# Patient Record
Sex: FEMALE | Race: WHITE | NOT HISPANIC OR LATINO | Employment: FULL TIME | URBAN - METROPOLITAN AREA
[De-identification: names, ages, dates, MRNs, and addresses within clinical notes are randomized per-mention and may not be internally consistent; named-entity substitution may affect disease eponyms.]

---

## 2018-06-06 ENCOUNTER — OFFICE VISIT (OUTPATIENT)
Dept: FAMILY MEDICINE | Facility: CLINIC | Age: 53
End: 2018-06-06
Payer: COMMERCIAL

## 2018-06-06 VITALS
DIASTOLIC BLOOD PRESSURE: 81 MMHG | TEMPERATURE: 98 F | HEART RATE: 51 BPM | OXYGEN SATURATION: 100 % | SYSTOLIC BLOOD PRESSURE: 133 MMHG | BODY MASS INDEX: 21.14 KG/M2 | WEIGHT: 98 LBS | HEIGHT: 57 IN | RESPIRATION RATE: 16 BRPM

## 2018-06-06 DIAGNOSIS — Z01.419 ENCOUNTER FOR GYNECOLOGICAL EXAMINATION WITHOUT ABNORMAL FINDING: Primary | ICD-10-CM

## 2018-06-06 DIAGNOSIS — Z12.11 SCREEN FOR COLON CANCER: ICD-10-CM

## 2018-06-06 DIAGNOSIS — R45.86 MOOD SWINGS: ICD-10-CM

## 2018-06-06 DIAGNOSIS — Z97.5 IUD (INTRAUTERINE DEVICE) IN PLACE: ICD-10-CM

## 2018-06-06 DIAGNOSIS — E06.3 HYPOTHYROIDISM DUE TO HASHIMOTO'S THYROIDITIS: ICD-10-CM

## 2018-06-06 DIAGNOSIS — Z12.4 SCREENING FOR MALIGNANT NEOPLASM OF CERVIX: ICD-10-CM

## 2018-06-06 DIAGNOSIS — R56.9 SEIZURES (H): ICD-10-CM

## 2018-06-06 DIAGNOSIS — Z12.31 VISIT FOR SCREENING MAMMOGRAM: ICD-10-CM

## 2018-06-06 LAB
ALBUMIN SERPL-MCNC: 3.8 G/DL (ref 3.4–5)
ALP SERPL-CCNC: 52 U/L (ref 40–150)
ALT SERPL W P-5'-P-CCNC: 33 U/L (ref 0–50)
ANION GAP SERPL CALCULATED.3IONS-SCNC: 11 MMOL/L (ref 3–14)
AST SERPL W P-5'-P-CCNC: 25 U/L (ref 0–45)
BILIRUB SERPL-MCNC: 0.5 MG/DL (ref 0.2–1.3)
BUN SERPL-MCNC: 13 MG/DL (ref 7–30)
CALCIUM SERPL-MCNC: 9.3 MG/DL (ref 8.5–10.1)
CHLORIDE SERPL-SCNC: 104 MMOL/L (ref 94–109)
CHOLEST SERPL-MCNC: 199 MG/DL
CO2 SERPL-SCNC: 26 MMOL/L (ref 20–32)
CREAT SERPL-MCNC: 0.63 MG/DL (ref 0.52–1.04)
GFR SERPL CREATININE-BSD FRML MDRD: >90 ML/MIN/1.7M2
GLUCOSE SERPL-MCNC: 103 MG/DL (ref 70–99)
HDLC SERPL-MCNC: 61 MG/DL
LDLC SERPL CALC-MCNC: 126 MG/DL
NONHDLC SERPL-MCNC: 138 MG/DL
POTASSIUM SERPL-SCNC: 4.2 MMOL/L (ref 3.4–5.3)
PROT SERPL-MCNC: 7.7 G/DL (ref 6.8–8.8)
SODIUM SERPL-SCNC: 141 MMOL/L (ref 133–144)
T4 FREE SERPL-MCNC: 1.51 NG/DL (ref 0.76–1.46)
TRIGL SERPL-MCNC: 60 MG/DL
TSH SERPL DL<=0.005 MIU/L-ACNC: 0.1 MU/L (ref 0.4–4)

## 2018-06-06 PROCEDURE — 99386 PREV VISIT NEW AGE 40-64: CPT | Performed by: NURSE PRACTITIONER

## 2018-06-06 PROCEDURE — 84443 ASSAY THYROID STIM HORMONE: CPT | Performed by: NURSE PRACTITIONER

## 2018-06-06 PROCEDURE — 87624 HPV HI-RISK TYP POOLED RSLT: CPT | Performed by: NURSE PRACTITIONER

## 2018-06-06 PROCEDURE — 80061 LIPID PANEL: CPT | Performed by: NURSE PRACTITIONER

## 2018-06-06 PROCEDURE — 80053 COMPREHEN METABOLIC PANEL: CPT | Performed by: NURSE PRACTITIONER

## 2018-06-06 PROCEDURE — 84439 ASSAY OF FREE THYROXINE: CPT | Performed by: NURSE PRACTITIONER

## 2018-06-06 PROCEDURE — 36415 COLL VENOUS BLD VENIPUNCTURE: CPT | Performed by: NURSE PRACTITIONER

## 2018-06-06 PROCEDURE — G0145 SCR C/V CYTO,THINLAYER,RESCR: HCPCS | Performed by: NURSE PRACTITIONER

## 2018-06-06 RX ORDER — LEVOTHYROXINE SODIUM 25 UG/1
125 TABLET ORAL
Qty: 30 TABLET | Status: CANCELLED | OUTPATIENT
Start: 2018-06-06

## 2018-06-06 RX ORDER — LEVOTHYROXINE SODIUM 125 UG/1
125 TABLET ORAL DAILY
Qty: 90 TABLET | Refills: 3 | Status: SHIPPED | OUTPATIENT
Start: 2018-06-06 | End: 2018-06-07 | Stop reason: DRUGHIGH

## 2018-06-06 NOTE — MR AVS SNAPSHOT
After Visit Summary   6/6/2018    Lety Alcantara    MRN: [id number]           Patient Information     Date Of Birth          1965        Visit Information        Provider Department      6/6/2018 8:00 AM Massiel Berger APRN [facility]        Today's Diagnoses     Encounter for gynecological examination without abnormal finding    -  1    Routine general medical examination at a health care facility        Seizures (H)        Screen for colon cancer        Visit for screening mammogram        Screening for malignant neoplasm of cervix        Screening for HIV (human immunodeficiency virus)        Need for prophylactic vaccination with tetanus-diphtheria (TD)        Hypothyroidism due to Hashimoto's thyroiditis          Care Instructions      Preventive Health Recommendations  Female Ages 50 - 64    Yearly exam: See your health care provider every year in order to  o Review health changes.   o Discuss preventive care.    o Review your medicines if your doctor has prescribed any.      Get a Pap test every three years (unless you have an abnormal result and your provider advises testing more often).    If you get Pap tests with HPV test, you only need to test every 5 years, unless you have an abnormal result.     You do not need a Pap test if your uterus was removed (hysterectomy) and you have not had cancer.    You should be tested each year for STDs (sexually transmitted diseases) if you're at risk.     Have a mammogram every 1 to 2 years.    Have a colonoscopy at age 50, or have a yearly FIT test (stool test). These exams screen for colon cancer.      Have a cholesterol test every 5 years, or more often if advised.    Have a diabetes test (fasting glucose) every three years. If you are at risk for diabetes, you should have this test more often.     If you are at risk for osteoporosis (brittle bone disease), think about having a bone density scan  (DEXA).    Shots: Get a flu shot each year. Get a tetanus shot every 10 years.    Nutrition:     Eat at least 5 servings of fruits and vegetables each day.    Eat whole-grain bread, whole-wheat pasta and brown rice instead of white grains and rice.    Talk to your provider about Calcium and Vitamin D.     Lifestyle    Exercise at least 150 minutes a week (30 minutes a day, 5 days a week). This will help you control your weight and prevent disease.    Limit alcohol to one drink per day.    No smoking.     Wear sunscreen to prevent skin cancer.     See your dentist every six months for an exam and cleaning.    See your eye doctor every 1 to 2 years.            Follow-ups after your visit        Additional Services     GASTROENTEROLOGY ADULT REF PROCEDURE ONLY Bernabe Nino [phone]; [facility]       Last Lab Result: No results found for: CR  There is no height or weight on file to calculate BMI.     Needed:  No  Language:  English    Patient will be contacted to schedule procedure.     Please be aware that coverage of these services is subject to the terms and limitations of your health insurance plan.  Call member services at your health plan with any benefit or coverage questions.  Any procedures must be performed at a [facility] facility OR coordinated by your clinic's referral office.    Please bring the following with you to your appointment:    (1) Any X-Rays, CTs or MRIs which have been performed.  Contact the facility where they were done to arrange for  prior to your scheduled appointment.    (2) List of current medications   (3) This referral request   (4) Any documents/labs given to you for this referral                  Future tests that were ordered for you today     Open Future Orders        Priority Expected Expires Ordered    MA SCREENING DIGITAL BILAT - Future  (s+30) Routine  6/6/2019 6/6/2018    Fecal colorectal cancer screen (FIT) Routine 6/27/2018  "8/29/2018 6/6/2018            Who to contact     If you have questions or need follow up information about today's clinic visit or your schedule please contact [facility] directly at [phone].  Normal or non-critical lab and imaging results will be communicated to you by MyChart, letter or phone within 4 business days after the clinic has received the results. If you do not hear from us within 7 days, please contact the clinic through Dapthart or phone. If you have a critical or abnormal lab result, we will notify you by phone as soon as possible.  Submit refill requests through [employer] or call your pharmacy and they will forward the refill request to us. Please allow 3 business days for your refill to be completed.          Additional Information About Your Visit        Dapthart Information     [employer] gives you secure access to your electronic health record. If you see a primary care provider, you can also send messages to your care team and make appointments. If you have questions, please call your primary care clinic.  If you do not have a primary care provider, please call [phone] and they will assist you.        Care EveryWhere ID     This is your Care EveryWhere ID. This could be used by other organizations to access your [facility] medical records  [id number]        Your Vitals Were     Pulse Temperature Respirations Height Last Period Pulse Oximetry    51 98  F (36.7  C) (Oral) 16 4' 8.5\" (1.435 m) 04/06/2018 (Approximate) 100%    Breastfeeding? BMI (Body Mass Index)                No 21.58 kg/m2           Blood Pressure from Last 3 Encounters:   06/06/18 133/81    Weight from Last 3 Encounters:   06/06/18 98 lb (44.5 kg)              We Performed the Following     Comprehensive metabolic panel     GASTROENTEROLOGY ADULT REF PROCEDURE ONLY Bernabe Nino [phone]; [facility]     HPV High Risk Types DNA Cervical     Lipid panel reflex to direct LDL Fasting  "    Pap imaged thin layer screen with HPV - recommended age 30 - 65 years (select HPV order below)     TSH with free T4 reflex          Today's Medication Changes          These changes are accurate as of 6/6/18  8:30 AM.  If you have any questions, ask your nurse or doctor.               These medicines have changed or have updated prescriptions.        Dose/Directions    * LEVOTHYROXINE SODIUM PO   This may have changed:  Another medication with the same name was added. Make sure you understand how and when to take each.   Changed by:  Massiel Berger APRN CNP        Dose:  125 mcg   Take 125 mcg by mouth   Refills:  0       * levothyroxine 125 MCG tablet   Commonly known as:  SYNTHROID/LEVOTHROID   This may have changed:  You were already taking a medication with the same name, and this prescription was added. Make sure you understand how and when to take each.   Used for:  Encounter for gynecological examination without abnormal finding   Changed by:  Massiel Berger APRN CNP        Dose:  125 mcg   Take 1 tablet (125 mcg) by mouth daily   Quantity:  90 tablet   Refills:  3       sertraline 50 MG tablet   Commonly known as:  ZOLOFT   This may have changed:  See the new instructions.   Used for:  Encounter for gynecological examination without abnormal finding   Changed by:  Massiel Berger APRN CNP        Dose:  50 mg   Take 1 tablet (50 mg) by mouth daily   Quantity:  90 tablet   Refills:  1       * Notice:  This list has 2 medication(s) that are the same as other medications prescribed for you. Read the directions carefully, and ask your doctor or other care provider to review them with you.      Stop taking these medicines if you haven't already. Please contact your care team if you have questions.     SPIRONOLACTONE PO   Stopped by:  Massiel Berger APRN CNP                Where to get your medicines      These medications were sent to [facility] [id number] [place] [facility]  [address]  [address], [facility] [address]     Phone:  [phone]     levothyroxine 125 MCG tablet    sertraline 50 MG tablet                Primary Care Provider Office Phone # Fax #    ANAYELI Ventura CAROLA [phone] [address] [facility] [address]  [place] [address]        Equal Access to Services     ELIZA LEWIS : Hadii aad ku hadasho Soomaali, waaxda luqadaha, qaybta kaalmada adeegyada, waxay idiin hayaan adeeg kharash la'aan . So [facility] [phone].    ATENCIÓN: Si amilcar dela cruz, tiene a elizalde disposición servicios gratuitos de asistencia lingüística. Kade al [phone].    We comply with applicable federal civil rights laws and [place] laws. We do not discriminate on the basis of race, color, national origin, age, disability, sex, sexual orientation, or gender identity.            Thank you!     Thank you for choosing [facility]  for your care. Our goal is always to provide you with excellent care. Hearing back from our patients is one way we can continue to improve our services. Please take a few minutes to complete the written survey that you may receive in the mail after your visit with us. Thank you!             Your Updated Medication List - Protect others around you: Learn how to safely use, store and throw away your medicines at www.disposemymeds.org.          This list is accurate as of 6/6/18  8:30 AM.  Always use your most recent med list.                   Brand Name Dispense Instructions for use Diagnosis    * LEVOTHYROXINE SODIUM PO      Take 125 mcg by mouth        * levothyroxine 125 MCG tablet    SYNTHROID/LEVOTHROID    90 tablet    Take 1 tablet (125 mcg) by mouth daily    Encounter for gynecological examination without abnormal finding       sertraline 50 MG tablet    ZOLOFT    90 tablet    Take 1 tablet (50 mg) by mouth daily    Encounter for gynecological examination without abnormal finding       * Notice:  This list  has 2 medication(s) that are the same as other medications prescribed for you. Read the directions carefully, and ask your doctor or other care provider to review them with you.

## 2018-06-06 NOTE — PROGRESS NOTES
SUBJECTIVE:   CC: Lety Alcantara is an 53 year old woman who presents for preventive health visit.     Physical   Annual:     Getting at least 3 servings of Calcium per day::  Yes    Bi-annual eye exam::  Yes    Dental care twice a year::  NO    Sleep apnea or symptoms of sleep apnea::  None    Diet::  Gluten-free/reduced    Frequency of exercise::  6-7 days/week    Duration of exercise::  45-60 minutes    Taking medications regularly::  Yes    Medication side effects::  Not applicable    Additional concerns today::  YES                  Today's PHQ-2 Score:   PHQ-2 (  [employer]) 6/3/2018   Q1: Little interest or pleasure in doing things 0   Q2: Feeling down, depressed or hopeless 0   PHQ-2 Score 0   Q1: Little interest or pleasure in doing things Not at all   Q2: Feeling down, depressed or hopeless Not at all   PHQ-2 Score 0       Abuse: Current or Past(Physical, Sexual or Emotional)- No  Do you feel safe in your environment - Yes    Social History   Substance Use Topics     Smoking status: Never Smoker     Smokeless tobacco: Never Used     Alcohol use No     Alcohol Use 6/3/2018   If you drink alcohol do you typically have greater than 3 drinks per day OR greater than 7 drinks per week? Not Applicable   No flowsheet data found.    Reviewed orders with patient.  Reviewed health maintenance and updated orders accordingly - Yes  Labs reviewed in EPIC  BP Readings from Last 3 Encounters:   18 133/81    Wt Readings from Last 3 Encounters:   18 98 lb (44.5 kg)                  Patient Active Problem List   Diagnosis     Mood swings (H)     Seizures (H)     Hypothyroidism due to Hashimoto's thyroiditis     Past Surgical History:   Procedure Laterality Date      SECTION  01       Social History   Substance Use Topics     Smoking status: Never Smoker     Smokeless tobacco: Never Used     Alcohol use No     Family History   Problem Relation Age of Onset     DIABETES Brother       Coronary Artery Disease Father      Hypertension Father      Hyperlipidemia Father      Depression Father      Hypertension Mother      Hyperlipidemia Mother      Breast Cancer Mother      Other Cancer Mother      Depression Sister      Anxiety Disorder Sister      Depression Daughter      Anxiety Disorder Daughter      Asthma Daughter      Thyroid Disease Sister          Current Outpatient Prescriptions   Medication Sig Dispense Refill     levothyroxine (SYNTHROID/LEVOTHROID) 125 MCG tablet Take 1 tablet (125 mcg) by mouth daily 90 tablet 3     LEVOTHYROXINE SODIUM PO Take 125 mcg by mouth       sertraline (ZOLOFT) 50 MG tablet Take 1 tablet (50 mg) by mouth daily 90 tablet 1     [DISCONTINUED] sertraline (ZOLOFT) 50 MG tablet TAKE 1 TABLET BY MOUTH EVERY DAY       Allergies   Allergen Reactions     Sulfa Drugs      No lab results found.     Patient over age 50, mutual decision to screen reflected in health maintenance.    Pertinent mammograms are reviewed under the imaging tab.  History of abnormal Pap smear: NO - age 30- 65 PAP every 3 years recommended    Reviewed and updated as needed this visit by clinical staff  Tobacco  Allergies  Meds  Med Hx  Surg Hx  Fam Hx  Soc Hx        Reviewed and updated as needed this visit by Provider               Seizure disorder:  Diagnosed years ago.  Last seizure 5  Years ago.  She had been on Keppra.  Sleep deprived EEG in March and then she weaned off of the Keppra.  Previously seeing Dr. Marshall at the [facility].    Thyroid:  Previoiusly managed by [facility].    Zoloft:  For anxiety and depression for several years.  Going great.          Review of Systems  CONSTITUTIONAL: NEGATIVE for fever, chills, change in weight  INTEGUMENTARU/SKIN: NEGATIVE for worrisome rashes, moles or lesions  EYES: NEGATIVE for vision changes or irritation  ENT: NEGATIVE for ear, mouth and throat problems  RESP: NEGATIVE for significant cough or SOB  BREAST: NEGATIVE for masses, tenderness or  "discharge  CV: NEGATIVE for chest pain, palpitations or peripheral edema  GI: NEGATIVE for nausea, abdominal pain, heartburn, or change in bowel habits  : NEGATIVE for unusual urinary or vaginal symptoms. Periods are regular.  MUSCULOSKELETAL: NEGATIVE for significant arthralgias or myalgia  NEURO: NEGATIVE for weakness, dizziness or paresthesias  ENDOCRINE: NEGATIVE for temperature intolerance, skin/hair changes  PSYCHIATRIC: NEGATIVE for changes in mood or affect     OBJECTIVE:   /81  Pulse 51  Temp 98  F (36.7  C) (Oral)  Resp 16  Ht 4' 8.5\" (1.435 m)  Wt 98 lb (44.5 kg)  LMP 04/06/2018 (Approximate)  SpO2 100%  Breastfeeding? No  BMI 21.58 kg/m2  Physical Exam  GENERAL: healthy, alert and no distress  EYES: Eyes grossly normal to inspection, PERRL and conjunctivae and sclerae normal  HENT: ear canals and TM's normal, nose and mouth without ulcers or lesions  NECK: no adenopathy, no asymmetry, masses, or scars and thyroid normal to palpation  RESP: lungs clear to auscultation - no rales, rhonchi or wheezes  BREAST: normal without masses, tenderness or nipple discharge and no palpable axillary masses or adenopathy  CV: regular rate and rhythm, normal S1 S2, no S3 or S4, no murmur, click or rub, no peripheral edema and peripheral pulses strong  ABDOMEN: soft, nontender, no hepatosplenomegaly, no masses and bowel sounds normal   (female): normal female external genitalia, normal urethral meatus, vaginal mucosa pink, moist, well rugated, and normal cervix/adnexa/uterus without masses or discharge  MS: no gross musculoskeletal defects noted, no edema  SKIN: no suspicious lesions or rashes  NEURO: Normal strength and tone, mentation intact and speech normal  PSYCH: mentation appears normal, affect normal/bright    ASSESSMENT/PLAN:     (Z01.419) Encounter for gynecological examination without abnormal finding  (primary encounter diagnosis)  Comment:   Plan: Lipid panel reflex to direct LDL Fasting,    "      GASTROENTEROLOGY ADULT REF PROCEDURE ONLY         Eliecha Trung [phone]; [facility], Pap imaged thin layer screen         with HPV - recommended age 30 - 65 years         (select HPV order below), HPV High Risk Types         DNA Cervical, Comprehensive metabolic panel,         Fecal colorectal cancer screen (FIT)            (R56.9) Seizures (H)  Comment: controlled  Plan: Lety is no longer under the care of a neurologist for seizure disorder.  According to Lety, she was released from neurology.  She will continue to monitor for any seizure problems.  I did tell her that in the event of a seizure she needs to follow-up with neurology as I do not manage seizures.  She verbalizes understanding.    (F39) Mood swings (H)  Comment: Stable  Plan: sertraline (ZOLOFT) 50 MG tablet        Lety has been on Zoloft for several years and has stable mood with this medication.  She will return to see me for a follow-up appointment and additional refills.    (E03.8,  E06.3) Hypothyroidism due to Hashimoto's thyroiditis  Comment: History of  Plan: levothyroxine (SYNTHROID/LEVOTHROID) 125 MCG         tablet, TSH with free T4 reflex        I assume today, that she will continue her current dose of levothyroxine at 125 mcg per day.  The TSH is pending.  If the TSH comes back abnormal, her dose will need to be adjusted and additional labs will need to be ordered for follow-up.    (Z97.5) IUD (intrauterine device) in place, Paragard  Comment:   Plan: She is going to check her history as she knows the ParaGard is to be in place for 10 years.  She thinks it is been close to 10 years.  If the ParaGard is due to be removed she will return to see me for removal.  Otherwise I would refer her to OB/GYN.,     (Z12.11) Screen for colon cancer  Comment: Routine  Plan: GASTROENTEROLOGY ADULT REF PROCEDURE ONLY         Braydonmaurice Nino [phone]; [facility], Fecal colorectal  "cancer screen        (FIT)        FIT at earliest convenience.  If negative for blood, next FIT test in one year.  If positive for blood, referral for colonoscopy.  Patient verbalizes understanding.    (Z12.31) Visit for screening mammogram  Comment: routine  Plan: MA SCREENING DIGITAL BILAT - Future  (s+30)            (Z12.4) Screening for malignant neoplasm of cervix  Comment: routine  Plan: Pap imaged thin layer screen with HPV -         recommended age 30 - 65 years (select HPV order        below), HPV High Risk Types DNA Cervical        Done today        COUNSELING:  Reviewed preventive health counseling, as reflected in patient instructions         reports that she has never smoked. She has never used smokeless tobacco.    Estimated body mass index is 21.58 kg/(m^2) as calculated from the following:    Height as of this encounter: 4' 8.5\" (1.435 m).    Weight as of this encounter: 98 lb (44.5 kg).       Counseling Resources:  ATP IV Guidelines  Pooled Cohorts Equation Calculator  Breast Cancer Risk Calculator  FRAX Risk Assessment  ICSI Preventive Guidelines  Dietary Guidelines for Americans, 2010  USDA's MyPlate  ASA Prophylaxis  Lung CA Screening    ANAYELI Cruz [facility]  Answers for HPI/ROS submitted by the patient on 6/3/2018   PHQ-2 Score: 0    "

## 2018-06-07 ASSESSMENT — PATIENT HEALTH QUESTIONNAIRE - PHQ9: SUM OF ALL RESPONSES TO PHQ QUESTIONS 1-9: 2

## 2018-06-08 LAB
COPATH REPORT: NORMAL
PAP: NORMAL

## 2018-06-11 LAB
FINAL DIAGNOSIS: NORMAL
HPV HR 12 DNA CVX QL NAA+PROBE: NEGATIVE
HPV16 DNA SPEC QL NAA+PROBE: NEGATIVE
HPV18 DNA SPEC QL NAA+PROBE: NEGATIVE
SPECIMEN DESCRIPTION: NORMAL
SPECIMEN SOURCE CVX/VAG CYTO: NORMAL

## 2018-08-29 ENCOUNTER — MYC MEDICAL ADVICE (OUTPATIENT)
Dept: FAMILY MEDICINE | Facility: CLINIC | Age: 53
End: 2018-08-29

## 2018-08-29 NOTE — TELEPHONE ENCOUNTER
Writer reviewed 6/7/18 MyChart encounter.    Writer responded as per below.  NIHARIKA AnguianoN, RN

## 2018-09-05 ENCOUNTER — OFFICE VISIT (OUTPATIENT)
Dept: FAMILY MEDICINE | Facility: CLINIC | Age: 53
End: 2018-09-05
Payer: COMMERCIAL

## 2018-09-05 VITALS
RESPIRATION RATE: 14 BRPM | TEMPERATURE: 98.1 F | BODY MASS INDEX: 21.14 KG/M2 | SYSTOLIC BLOOD PRESSURE: 124 MMHG | HEIGHT: 57 IN | OXYGEN SATURATION: 98 % | DIASTOLIC BLOOD PRESSURE: 68 MMHG | WEIGHT: 98 LBS | HEART RATE: 48 BPM

## 2018-09-05 DIAGNOSIS — E06.3 HYPOTHYROIDISM DUE TO HASHIMOTO'S THYROIDITIS: ICD-10-CM

## 2018-09-05 PROCEDURE — 99213 OFFICE O/P EST LOW 20 MIN: CPT | Performed by: NURSE PRACTITIONER

## 2018-09-05 PROCEDURE — 36415 COLL VENOUS BLD VENIPUNCTURE: CPT | Performed by: NURSE PRACTITIONER

## 2018-09-05 PROCEDURE — 84443 ASSAY THYROID STIM HORMONE: CPT | Performed by: NURSE PRACTITIONER

## 2018-09-05 RX ORDER — LEVOTHYROXINE SODIUM 112 UG/1
112 TABLET ORAL DAILY
Qty: 90 TABLET | Refills: 3 | Status: SHIPPED | OUTPATIENT
Start: 2018-09-05 | End: 2019-07-16

## 2018-09-05 NOTE — MR AVS SNAPSHOT
"              After Visit Summary   9/5/2018    Lety Alcantara    MRN: [id number]           Patient Information     Date Of Birth          1965        Visit Information        Provider Department      9/5/2018 5:40 PM Massiel Berger APRN CNP [facility]        Today's Diagnoses     Hypothyroidism due to Hashimoto's thyroiditis           Follow-ups after your visit        Who to contact     If you have questions or need follow up information about today's clinic visit or your schedule please contact [facility] directly at [phone].  Normal or non-critical lab and imaging results will be communicated to you by YellowHammerhart, letter or phone within 4 business days after the clinic has received the results. If you do not hear from us within 7 days, please contact the clinic through [employer]cing[employer]ett or phone. If you have a critical or abnormal lab result, we will notify you by phone as soon as possible.  Submit refill requests through [employer] or call your pharmacy and they will forward the refill request to us. Please allow 3 business days for your refill to be completed.          Additional Information About Your Visit        MyChart Information     [employer] gives you secure access to your electronic health record. If you see a primary care provider, you can also send messages to your care team and make appointments. If you have questions, please call your primary care clinic.  If you do not have a primary care provider, please call [phone] and they will assist you.        Care EveryWhere ID     This is your Care EveryWhere ID. This could be used by other organizations to access your [facility] medical records  [id number]        Your Vitals Were     Pulse Temperature Respirations Height Pulse Oximetry BMI (Body Mass Index)    48 98.1  F (36.7  C) (Tympanic) 14 4' 8.5\" (1.435 m) 98% 21.58 kg/m2       Blood Pressure from Last 3 Encounters:   09/05/18 124/68 "   06/06/18 133/81    Weight from Last 3 Encounters:   09/05/18 98 lb (44.5 kg)   06/06/18 98 lb (44.5 kg)              We Performed the Following     TSH with free T4 reflex          Where to get your medicines      These medications were sent to [place] [id number] [place] [facility] - [place], [place] - [address]  [address], [facility] [address]     Phone:  [phone]     levothyroxine 112 MCG tablet          Primary Care Provider Office Phone # Fax #    Massiel DOUGLAS Ab, ANAYELI [facility] [phone] [address] [place] [address]        Equal Access to Services     TAD LEWIS : Marcus Awad, wacarolynn sullivan, qazack kaalmada tramaine, catherine centeno . So [facility] [phone].    ATENCIÓN: Si habla español, tiene a elizalde disposición servicios gratuitos de asistencia lingüística. Llame al [phone].    We comply with applicable federal civil rights laws and [place] laws. We do not discriminate on the basis of race, color, national origin, age, disability, sex, sexual orientation, or gender identity.            Thank you!     Thank you for choosing [facility]  for your care. Our goal is always to provide you with excellent care. Hearing back from our patients is one way we can continue to improve our services. Please take a few minutes to complete the written survey that you may receive in the mail after your visit with us. Thank you!             Your Updated Medication List - Protect others around you: Learn how to safely use, store and throw away your medicines at www.disposemymeds.org.          This list is accurate as of 9/5/18  6:41 PM.  Always use your most recent med list.                   Brand Name Dispense Instructions for use Diagnosis    levothyroxine 112 MCG tablet    SYNTHROID/LEVOTHROID    90 tablet    Take 1 tablet (112 mcg) by mouth daily    Hypothyroidism due to Hashimoto's thyroiditis       sertraline 50  MG tablet    ZOLOFT    90 tablet    Take 1 tablet (50 mg) by mouth daily    Mood swings (H)

## 2018-09-06 LAB — TSH SERPL DL<=0.005 MIU/L-ACNC: 1.86 MU/L (ref 0.4–4)

## 2018-09-07 NOTE — PROGRESS NOTES
Bernardino Davidsno,    This note is to let you know that your thyroid level is perfect. I would recommend that you continue on your current dose of levothyroxine and we will recheck your levels in one year.    Let me know if you have any questions.    Massiel GUADALUPE CNP

## 2018-12-31 DIAGNOSIS — R45.86 MOOD SWINGS: ICD-10-CM

## 2019-01-01 NOTE — TELEPHONE ENCOUNTER
"Requested Prescriptions   Pending Prescriptions Disp Refills     sertraline (ZOLOFT) 50 MG tablet [Pharmacy Med Name: SERTRALINE HCL 50 MG TABLET] 90 tablet 1          Last Written Prescription Date:  6/6/18  Last Fill Quantity: 90,   # refills: 1  Last Office Visit: 9/5/18  Future Office visit:       Routing refill request to provider for review/approval because:  Plan 6/6/18 (F39) Mood swings (H) dx not on protocol  Comment: Stable  Plan: sertraline (ZOLOFT) 50 MG tablet        Lety has been on Zoloft for several years and has stable mood with this medication.  She will return to see me for a follow-up appointment and additional refills.   Sig: TAKE 1 TABLET BY MOUTH EVERY DAY    SSRIs Protocol Passed - 12/31/2018  4:13 PM       Passed - Recent (12 mo) or future (30 days) visit within the authorizing provider's specialty    Patient had office visit in the last 12 months or has a visit in the next 30 days with authorizing provider or within the authorizing provider's specialty.  See \"Patient Info\" tab in inbasket, or \"Choose Columns\" in Meds & Orders section of the refill encounter.             Passed - Patient is age 18 or older       Passed - No active pregnancy on record       Passed - No positive pregnancy test in last 12 months          "

## 2019-01-03 NOTE — TELEPHONE ENCOUNTER
Prescription approved per [facility] [facility].  Alissa Whipple RN    I refilled for 3 months.  Patient did have a f/u appointment after the 6/6/18 appointment and provider asked for PHQ-9 and it is <4.

## 2019-07-13 ASSESSMENT — ENCOUNTER SYMPTOMS
JOINT SWELLING: 0
DYSURIA: 0
NERVOUS/ANXIOUS: 1
PALPITATIONS: 0
CONSTIPATION: 0
DIARRHEA: 0
HEARTBURN: 0
NAUSEA: 0
HEMATOCHEZIA: 0
WEAKNESS: 0
FEVER: 0
ABDOMINAL PAIN: 0
MYALGIAS: 0
COUGH: 0
FREQUENCY: 0
HEMATURIA: 0
DIZZINESS: 0
HEADACHES: 1
CHILLS: 0
ARTHRALGIAS: 1
BREAST MASS: 0
SHORTNESS OF BREATH: 0
PARESTHESIAS: 0
EYE PAIN: 0
SORE THROAT: 0

## 2019-07-16 ENCOUNTER — OFFICE VISIT (OUTPATIENT)
Dept: FAMILY MEDICINE | Facility: CLINIC | Age: 54
End: 2019-07-16
Payer: COMMERCIAL

## 2019-07-16 VITALS
HEART RATE: 50 BPM | BODY MASS INDEX: 21.79 KG/M2 | DIASTOLIC BLOOD PRESSURE: 65 MMHG | WEIGHT: 101 LBS | SYSTOLIC BLOOD PRESSURE: 114 MMHG | TEMPERATURE: 97.7 F | OXYGEN SATURATION: 100 % | HEIGHT: 57 IN | RESPIRATION RATE: 18 BRPM

## 2019-07-16 DIAGNOSIS — M54.41 RIGHT-SIDED LOW BACK PAIN WITH RIGHT-SIDED SCIATICA, UNSPECIFIED CHRONICITY: ICD-10-CM

## 2019-07-16 DIAGNOSIS — R56.9 SEIZURES (H): ICD-10-CM

## 2019-07-16 DIAGNOSIS — R45.86 MOOD SWINGS: ICD-10-CM

## 2019-07-16 DIAGNOSIS — Z00.00 ROUTINE GENERAL MEDICAL EXAMINATION AT A HEALTH CARE FACILITY: Primary | ICD-10-CM

## 2019-07-16 DIAGNOSIS — Z12.11 SCREENING FOR COLON CANCER: ICD-10-CM

## 2019-07-16 DIAGNOSIS — E06.3 HYPOTHYROIDISM DUE TO HASHIMOTO'S THYROIDITIS: ICD-10-CM

## 2019-07-16 LAB
ALBUMIN SERPL-MCNC: 3.9 G/DL (ref 3.4–5)
ALP SERPL-CCNC: 68 U/L (ref 40–150)
ALT SERPL W P-5'-P-CCNC: 28 U/L (ref 0–50)
ANION GAP SERPL CALCULATED.3IONS-SCNC: 8 MMOL/L (ref 3–14)
AST SERPL W P-5'-P-CCNC: 24 U/L (ref 0–45)
BILIRUB SERPL-MCNC: 0.4 MG/DL (ref 0.2–1.3)
BUN SERPL-MCNC: 13 MG/DL (ref 7–30)
CALCIUM SERPL-MCNC: 9.2 MG/DL (ref 8.5–10.1)
CHLORIDE SERPL-SCNC: 104 MMOL/L (ref 94–109)
CHOLEST SERPL-MCNC: 191 MG/DL
CO2 SERPL-SCNC: 24 MMOL/L (ref 20–32)
CREAT SERPL-MCNC: 0.67 MG/DL (ref 0.52–1.04)
GFR SERPL CREATININE-BSD FRML MDRD: >90 ML/MIN/{1.73_M2}
GLUCOSE SERPL-MCNC: 90 MG/DL (ref 70–99)
HDLC SERPL-MCNC: 64 MG/DL
HGB BLD-MCNC: 12.4 G/DL (ref 11.7–15.7)
LDLC SERPL CALC-MCNC: 112 MG/DL
NONHDLC SERPL-MCNC: 127 MG/DL
POTASSIUM SERPL-SCNC: 4.1 MMOL/L (ref 3.4–5.3)
PROT SERPL-MCNC: 7.8 G/DL (ref 6.8–8.8)
SODIUM SERPL-SCNC: 136 MMOL/L (ref 133–144)
TRIGL SERPL-MCNC: 76 MG/DL
TSH SERPL DL<=0.005 MIU/L-ACNC: 0.63 MU/L (ref 0.4–4)

## 2019-07-16 PROCEDURE — 80061 LIPID PANEL: CPT | Performed by: NURSE PRACTITIONER

## 2019-07-16 PROCEDURE — 84443 ASSAY THYROID STIM HORMONE: CPT | Performed by: NURSE PRACTITIONER

## 2019-07-16 PROCEDURE — 36415 COLL VENOUS BLD VENIPUNCTURE: CPT | Performed by: NURSE PRACTITIONER

## 2019-07-16 PROCEDURE — 99396 PREV VISIT EST AGE 40-64: CPT | Performed by: NURSE PRACTITIONER

## 2019-07-16 PROCEDURE — 85018 HEMOGLOBIN: CPT | Performed by: NURSE PRACTITIONER

## 2019-07-16 PROCEDURE — 80053 COMPREHEN METABOLIC PANEL: CPT | Performed by: NURSE PRACTITIONER

## 2019-07-16 PROCEDURE — 90715 TDAP VACCINE 7 YRS/> IM: CPT | Performed by: NURSE PRACTITIONER

## 2019-07-16 PROCEDURE — 90471 IMMUNIZATION ADMIN: CPT | Performed by: NURSE PRACTITIONER

## 2019-07-16 PROCEDURE — 99213 OFFICE O/P EST LOW 20 MIN: CPT | Mod: 25 | Performed by: NURSE PRACTITIONER

## 2019-07-16 RX ORDER — LEVOTHYROXINE SODIUM 112 UG/1
112 TABLET ORAL DAILY
Qty: 90 TABLET | Refills: 3 | Status: SHIPPED | OUTPATIENT
Start: 2019-07-16 | End: 2020-08-28

## 2019-07-16 ASSESSMENT — ENCOUNTER SYMPTOMS
FEVER: 0
HEMATOCHEZIA: 0
PARESTHESIAS: 0
CONSTIPATION: 0
NAUSEA: 0
EYE PAIN: 0
BREAST MASS: 0
MYALGIAS: 0
WEAKNESS: 0
HEARTBURN: 0
ARTHRALGIAS: 1
HEADACHES: 1
CHILLS: 0
JOINT SWELLING: 0
PALPITATIONS: 0
ABDOMINAL PAIN: 0
NERVOUS/ANXIOUS: 1
FREQUENCY: 0
HEMATURIA: 0
DIARRHEA: 0
SHORTNESS OF BREATH: 0
DIZZINESS: 0
SORE THROAT: 0
COUGH: 0
DYSURIA: 0

## 2019-07-16 ASSESSMENT — MIFFLIN-ST. JEOR: SCORE: 934.07

## 2019-07-16 NOTE — PROGRESS NOTES
SUBJECTIVE:   CC: Lety Alcantara is an 54 year old woman who presents for preventive health visit.     Healthy Habits:     Getting at least 3 servings of Calcium per day:  Yes    Bi-annual eye exam:  Yes    Dental care twice a year:  NO    Sleep apnea or symptoms of sleep apnea:  None    Diet:  Gluten-free/reduced    Frequency of exercise:  4-5 days/week    Duration of exercise:  45-60 minutes    Taking medications regularly:  Yes    Medication side effects:  None    PHQ-2 Total Score: 0    Additional concerns today:  Yes    Today's PHQ-2 Score:   PHQ-2 ( 1999 [employer]) 7/13/2019   Q1: Little interest or pleasure in doing things 0   Q2: Feeling down, depressed or hopeless 0   PHQ-2 Score 0   Q1: Little interest or pleasure in doing things Not at all   Q2: Feeling down, depressed or hopeless Not at all   PHQ-2 Score 0     Abuse: Current or Past(Physical, Sexual or Emotional)- No  Do you feel safe in your environment? Yes    Social History     Tobacco Use     Smoking status: Never Smoker     Smokeless tobacco: Never Used   Substance Use Topics     Alcohol use: No     If you drink alcohol do you typically have >3 drinks per day or >7 drinks per week? No    Alcohol Use 7/13/2019   Prescreen: >3 drinks/day or >7 drinks/week? Not Applicable   Prescreen: >3 drinks/day or >7 drinks/week? -   No flowsheet data found.    Reviewed orders with patient.  Reviewed health maintenance and updated orders accordingly - Yes  Lab work is in process  Labs reviewed in EPIC  BP Readings from Last 3 Encounters:   07/16/19 114/65   09/05/18 124/68   06/06/18 133/81    Wt Readings from Last 3 Encounters:   07/16/19 45.8 kg (101 lb)   09/05/18 44.5 kg (98 lb)   06/06/18 44.5 kg (98 lb)                  Patient Active Problem List   Diagnosis     Mood swings     Seizures (H)     Hypothyroidism due to Hashimoto's thyroiditis     IUD (intrauterine device) in place, Paragard     Past Surgical History:   Procedure Laterality Date       SECTION  01       Social History     Tobacco Use     Smoking status: Never Smoker     Smokeless tobacco: Never Used   Substance Use Topics     Alcohol use: No     Family History   Problem Relation Age of Onset     Diabetes Brother      Coronary Artery Disease Father      Hypertension Father      Hyperlipidemia Father      Depression Father      Hypertension Mother      Hyperlipidemia Mother      Breast Cancer Mother      Other Cancer Mother      Depression Sister      Anxiety Disorder Sister      Depression Daughter      Anxiety Disorder Daughter      Asthma Daughter      Thyroid Disease Sister          Current Outpatient Medications   Medication Sig Dispense Refill     levothyroxine (SYNTHROID/LEVOTHROID) 112 MCG tablet Take 1 tablet (112 mcg) by mouth daily 90 tablet 3     sertraline (ZOLOFT) 50 MG tablet Take 1 tablet (50 mg) by mouth daily 90 tablet 1     Allergies   Allergen Reactions     Molds & Smuts Anaphylaxis     Seafood Anaphylaxis     Sulfa Drugs Rash     Recent Labs   Lab Test 18  1737 18  0847   LDL  --  126*   HDL  --  61   TRIG  --  60   ALT  --  33   CR  --  0.63   GFRESTIMATED  --  >90   GFRESTBLACK  --  >90   POTASSIUM  --  4.2   TSH 1.86 0.10*        Mammogram Screening: Patient over age 50, mutual decision to screen reflected in health maintenance.    Pertinent mammograms are reviewed under the imaging tab.  History of abnormal Pap smear:   Last 3 Pap and HPV Results:   PAP / HPV Latest Ref Rng & Units 2018 5/15/2015   PAP - NIL NIL   HPV 16 DNA NEG:Negative Negative Negative   HPV 18 DNA NEG:Negative Negative Negative   OTHER HR HPV NEG:Negative Negative Negative     PAP / HPV Latest Ref Rng & Units 2018 5/15/2015   PAP - NIL NIL   HPV 16 DNA NEG:Negative Negative Negative   HPV 18 DNA NEG:Negative Negative Negative   OTHER HR HPV NEG:Negative Negative Negative   LMP >1 year ago.  Not currently sexually active.    Reviewed and updated as needed this visit by  "clinical staff  Tobacco  Allergies  Meds  Med Hx  Surg Hx  Fam Hx  Soc Hx        Reviewed and updated as needed this visit by Provider          Thyroid:  Going well.  On levothyroxine.  Due recheck today.    Low back pain.  Arthritis and a \"synovial cyst.\"  Managed by [facility].  PT was not encouraged by [facility].   Considering a consult with a [place] specialist.    History of seizures:  Last seizure >10 years ago.  No longer on medications.    Headaches:  History of migraines.  She had a migraine recently that was painful.  + aura.  \"it took me by surprise.\"    Zoloft:  For depression/anxiety.  \"I went off of it once and I did not do well.\"  Working well at this time.  No side effects.  Busy life--daughters going to college.  Requesting refills today.       Review of Systems   Constitutional: Negative for chills and fever.   HENT: Negative for congestion, ear pain, hearing loss and sore throat.    Eyes: Negative for pain and visual disturbance.   Respiratory: Negative for cough and shortness of breath.    Cardiovascular: Negative for chest pain, palpitations and peripheral edema.   Gastrointestinal: Negative for abdominal pain, constipation, diarrhea, heartburn, hematochezia and nausea.   Breasts:  Negative for tenderness, breast mass and discharge.   Genitourinary: Negative for dysuria, frequency, genital sores, hematuria, pelvic pain, urgency, vaginal bleeding and vaginal discharge.   Musculoskeletal: Positive for arthralgias. Negative for joint swelling and myalgias.   Skin: Negative for rash.   Neurological: Positive for headaches. Negative for dizziness, weakness and paresthesias.   Psychiatric/Behavioral: Negative for mood changes. The patient is nervous/anxious.         OBJECTIVE:   /65 (BP Location: Left arm, Patient Position: Sitting, Cuff Size: Adult Regular)   Pulse 50   Temp 97.7  F (36.5  C) (Oral)   Resp 18   Ht 1.435 m (4' 8.5\")   Wt 45.8 kg (101 lb)   SpO2 100%   BMI 22.24 kg/m  "   Physical Exam  GENERAL: healthy, alert and no distress  EYES: Eyes grossly normal to inspection, PERRL and conjunctivae and sclerae normal  HENT: ear canals and TM's normal, nose and mouth without ulcers or lesions  NECK: no adenopathy, no asymmetry, masses, or scars and thyroid normal to palpation  RESP: lungs clear to auscultation - no rales, rhonchi or wheezes  BREAST: normal without masses, tenderness or nipple discharge and no palpable axillary masses or adenopathy  CV: regular rate and rhythm, normal S1 S2, no S3 or S4, no murmur, click or rub, no peripheral edema and peripheral pulses strong  ABDOMEN: soft, nontender, no hepatosplenomegaly, no masses and bowel sounds normal  MS: no gross musculoskeletal defects noted, no edema  SKIN: no suspicious lesions or rashes. Small domed cyst on occipital scalp consistent with a sebaceous cyst and a small superficial spongy cyst approximately 0.5cm diameter to left elbow consistent with a lipoma.   NEURO: Normal strength and tone, mentation intact and speech normal  PSYCH: mentation appears normal, affect normal/bright    Diagnostics:  Results pending    ASSESSMENT/PLAN:   (Z00.00) Routine general medical examination at a health care facility  (primary encounter diagnosis)  Comment:   Plan: TDAP VACCINE (ADACEL),      ADMIN VACCINE,         FIRST, levothyroxine (SYNTHROID/LEVOTHROID) 112        MCG tablet, sertraline (ZOLOFT) 50 MG tablet,         *MA Screening Digital Bilateral, Lipid panel         reflex to direct LDL Fasting, Comprehensive         metabolic panel, Hemoglobin, TSH with free T4         reflex, Fecal colorectal cancer screen (FIT)            (R56.9) Seizures (H)  Comment: history of  Plan: no treatment necessary at this time.     (E03.8,  E06.3) Hypothyroidism due to Hashimoto's thyroiditis  Comment: History of  Plan: levothyroxine (SYNTHROID/LEVOTHROID) 112 MCG         tablet, TSH with free T4 reflex, OFFICE/OUTPT         VISIT,EST,LEVL III         "Continue current dose of levothyroxine.  Yearly clinic appointments for refills/rechecks, sooner problems.    (R45.86) Mood swings  Comment: Improved  Plan: sertraline (ZOLOFT) 50 MG tablet, OFFICE/OUTPT         VISIT,MILAN FLORES III        I did go ahead and refill her Zoloft today.  She will continue to take her medication daily as prescribed.  Yearly rechecks for refills, sooner problems.    (M54.41) Right-sided low back pain with right-sided sciatica, unspecified chronicity  Comment: Chronic  Plan: ORTHOPEDICS ADULT REFERRAL        I did give her a referral to be seen by a [place] orthopedist at her convenience.  She is appreciative.    (Z12.11) Screening for colon cancer  Comment:   Plan: Fecal colorectal cancer screen (FIT)        FIT at earliest convenience.  If negative for blood, next FIT test in one year.  If positive for blood, referral for colonoscopy.  Patient verbalizes understanding.        COUNSELING:  Reviewed preventive health counseling, as reflected in patient instructions    Estimated body mass index is 22.24 kg/m  as calculated from the following:    Height as of this encounter: 1.435 m (4' 8.5\").    Weight as of this encounter: 45.8 kg (101 lb).         reports that she has never smoked. She has never used smokeless tobacco.      Counseling Resources:  ATP IV Guidelines  Pooled Cohorts Equation Calculator  Breast Cancer Risk Calculator  FRAX Risk Assessment  ICSI Preventive Guidelines  Dietary Guidelines for Americans, 2010  USDA's MyPlate  ASA Prophylaxis  Lung CA Screening    ANAYELI Cruz CNP  [facility]  "

## 2019-07-17 DIAGNOSIS — Z00.00 ROUTINE GENERAL MEDICAL EXAMINATION AT A HEALTH CARE FACILITY: ICD-10-CM

## 2019-07-17 PROCEDURE — 77067 SCR MAMMO BI INCL CAD: CPT | Mod: TC

## 2019-07-17 NOTE — RESULT ENCOUNTER NOTE
Bernardino Davidson.    This is to let you know that the results of your recent blood tests are all normal.     Massiel GUADALUPE CNP

## 2019-07-31 ENCOUNTER — DOCUMENTATION ONLY (OUTPATIENT)
Dept: CARE COORDINATION | Facility: CLINIC | Age: 54
End: 2019-07-31

## 2019-08-02 ENCOUNTER — MYC MEDICAL ADVICE (OUTPATIENT)
Dept: FAMILY MEDICINE | Facility: CLINIC | Age: 54
End: 2019-08-02

## 2019-08-02 DIAGNOSIS — M25.50 MULTIPLE JOINT PAIN: Primary | ICD-10-CM

## 2019-08-02 NOTE — TELEPHONE ENCOUNTER
Massiel Berger or LEATHA sign off on the rheumatology referral if appropriate.  Demetria Goyal RN

## 2019-08-08 ENCOUNTER — TELEPHONE (OUTPATIENT)
Dept: RHEUMATOLOGY | Facility: CLINIC | Age: 54
End: 2019-08-08

## 2019-08-08 NOTE — TELEPHONE ENCOUNTER
[facility] Call Center    Phone Message    May a detailed message be left on voicemail: yes    Reason for Call: Other: Patient is being referred to the clinic for Multiple joint pain referral is in epic from   Massiel Berger Please follow up with the patient to start the intake process. Thank you.    Action Taken: Message routed to:  [facility] ([facility] rheum

## 2019-08-15 NOTE — TELEPHONE ENCOUNTER
RECORDS RECEIVED FROM: Lumbar back pain- synovial cyst and arthritis- previously seen at [facility] where she received injections about a year ago- MRI completed about a year ago at [facility]- appt per pt. Referred by Massiel Berger APRN CNP   DATE RECEIVED: Aug 30, 2019    NOTES STATUS DETAILS   OFFICE NOTE from referring provider Internal 7/16/19 ANAYELI Berger   OFFICE NOTE from other specialist Care Everywhere 8/16/17 Dr. Shepherd   DISCHARGE SUMMARY from hospital N/A    DISCHARGE REPORT from the ER N/A    OPERATIVE REPORT N/A    MEDICATION LIST Internal    IMPLANT RECORD/STICKER N/A    LABS     CBC/DIFF N/A    CULTURES N/A    INJECTIONS DONE IN RADIOLOGY N/A    MRI Received  [facility] 6/10/17   CT SCAN N/A    XRAYS (IMAGES & REPORTS) Received  [facility] 7/3/17, 5/26/17   TUMOR     PATHOLOGY  Slides & report N/A      08/15/19   2:14 PM  Faxed request to [facility] for images

## 2019-08-30 ENCOUNTER — PRE VISIT (OUTPATIENT)
Dept: ORTHOPEDICS | Facility: CLINIC | Age: 54
End: 2019-08-30

## 2019-09-05 NOTE — TELEPHONE ENCOUNTER
Attempted to connect with patient, to set up a telephone appointment with Referral Specialist to complete intake form over the phone without success. Left a message for patient to call the clinic to schedule date and time to complete the intake form over the phone.     Pam Mayo CMA   9/5/2019 4:46 PM

## 2019-09-24 NOTE — TELEPHONE ENCOUNTER
Final attempt to reach out to patient without success regarding scheduling a telephone appointment to complete an intake form. Closing referral request.   Pam Mayo CMA   9/24/2019 4:46 PM

## 2019-10-03 ENCOUNTER — HEALTH MAINTENANCE LETTER (OUTPATIENT)
Age: 54
End: 2019-10-03

## 2020-08-01 DIAGNOSIS — R45.86 MOOD SWINGS: ICD-10-CM

## 2020-08-01 DIAGNOSIS — Z00.00 ROUTINE GENERAL MEDICAL EXAMINATION AT A HEALTH CARE FACILITY: ICD-10-CM

## 2020-08-28 ENCOUNTER — MYC MEDICAL ADVICE (OUTPATIENT)
Dept: FAMILY MEDICINE | Facility: CLINIC | Age: 55
End: 2020-08-28

## 2020-08-28 DIAGNOSIS — E06.3 HYPOTHYROIDISM DUE TO HASHIMOTO'S THYROIDITIS: ICD-10-CM

## 2020-08-28 DIAGNOSIS — Z00.00 ROUTINE GENERAL MEDICAL EXAMINATION AT A HEALTH CARE FACILITY: ICD-10-CM

## 2020-08-28 RX ORDER — LEVOTHYROXINE SODIUM 112 UG/1
112 TABLET ORAL DAILY
Qty: 30 TABLET | Refills: 0 | Status: SHIPPED | OUTPATIENT
Start: 2020-08-28 | End: 2020-09-23

## 2020-08-31 ENCOUNTER — TELEPHONE (OUTPATIENT)
Dept: FAMILY MEDICINE | Facility: CLINIC | Age: 55
End: 2020-08-31

## 2020-08-31 DIAGNOSIS — E06.3 HYPOTHYROIDISM DUE TO HASHIMOTO'S THYROIDITIS: ICD-10-CM

## 2020-08-31 DIAGNOSIS — Z13.220 SCREENING FOR HYPERLIPIDEMIA: ICD-10-CM

## 2020-08-31 DIAGNOSIS — Z13.1 SCREENING FOR DIABETES MELLITUS: ICD-10-CM

## 2020-08-31 DIAGNOSIS — Z13.0 SCREENING FOR IRON DEFICIENCY ANEMIA: ICD-10-CM

## 2020-08-31 DIAGNOSIS — Z12.11 SCREENING FOR COLON CANCER: Primary | ICD-10-CM

## 2020-08-31 NOTE — TELEPHONE ENCOUNTER
From:Lety Alcantara       Sent:8/31/2020 12:16 PM CDT         To:[facility]    Subject:[facility]    Would it be possible for someone to send me a stool sample test prior to my appointment so I could get that done before I come in to see Massiel? I never sent one in last year and I would like to get that taken care of.    Thank you!    I did send patient a MY Chart response after checking with the lab:  I will send a message to Massiel and see if it can be ordered.  I spoke to our lab and they are not able to mail out the test kits, you would have to pick it up.  The order would need to be in the chart first.   Beryl Carr RN

## 2020-09-01 NOTE — TELEPHONE ENCOUNTER
I placed the order for the fit test.  Please let the patient know.  I do think she needs come into the clinic to  the fit test and I think this would need to be done by a lab only appointment.  Because she needs come into the clinic to  the fit test, I also want her to get her blood work drawn before that appointment.  I am checking her blood sugar, hemoglobin, her thyroid, and a cholesterol panel.  I will then have all of these results prior to her appointment.    Please let her know, please have her schedule that lab only appointment as soon as possible.

## 2020-09-01 NOTE — TELEPHONE ENCOUNTER
Left message on machine to call back.  Ask to speak to any triage nurse, let them know it's a return call.    Leave a number and time that you can be reached.     Please give message as below and schedule lab only appointment.  Beryl Carr RN

## 2020-09-03 ENCOUNTER — MYC MEDICAL ADVICE (OUTPATIENT)
Dept: FAMILY MEDICINE | Facility: CLINIC | Age: 55
End: 2020-09-03

## 2020-09-03 NOTE — TELEPHONE ENCOUNTER
I left a message for patient to call to speak to a nurse and also sent a My Chart message.  Beryl Carr RN

## 2020-09-08 ENCOUNTER — MYC MEDICAL ADVICE (OUTPATIENT)
Dept: FAMILY MEDICINE | Facility: CLINIC | Age: 55
End: 2020-09-08

## 2020-09-08 DIAGNOSIS — Z20.822 SUSPECTED COVID-19 VIRUS INFECTION: Primary | ICD-10-CM

## 2020-09-08 NOTE — TELEPHONE ENCOUNTER
[facility] Hospital Medicine  Progress Note    Patient Name: Adela Garay  MRN: [id number]  Patient Class: IP- Inpatient   Admission Date: 10/30/2017  Length of Stay: 5 days  Attending Physician: Justice Alexander MD  Primary Care Provider: Torrie Farrell MD    [facility]: [facility] MED ROBBIE Dover NP    Subjective:     Principal Problem:Acute on chronic diastolic heart failure    HPI:  Ms. Garay is a 89 year old  woman with past medical history of HTN, HLD, DM, aFib, HFpEF, and recent TAVR complicated by PPM placement for CHB (Portico Valve 29mm ) on 10/17/17 for severe AS who presents to the ED with complaints of leg swelling. She states that she has been having issues since she went home after the surgery. She endorses orthopnea, ESTRADA, new cough, leg swelling, and weight gain. Denies issues like this before. Denies Fever. Denies chest pain.     While in ED, chemistry was unremarkable, BNP elevated at 918, troponin negative and CBC stable. CXR with bilateral pleural effusion larger on the right side and atelectatic changes at the lung bases; EKG with atrial fibrillation with paced ventricular rhythm.    The patient was admitted to the Hospital Medicine Service for further evaluation and management.     Hospital Course:  Ms. Garay was admitted 10/30 s/p recent TAVR and PPM with acute on chronic diastolic heart failure and edema.  She was admitted at 151 lbs with potential goal weight of 143-145 lbs per daugher. She was evaluated by cardiology in ED and assessed by TAVR team in house by Dr. Jarvis. Diuresis initiated with lasix 40 mg IVP BID, and titrated to 60mg iv bid for improved diuresis.  Her dry weight appears to be closer to 135 lbs, as she is still quite volume up at 142 lbs.   Diuresing well, oxygen sat down to 88-89% requiring re-institution of oxgyen, lungs still sound wet on exam, + jvd, + peripheral edema still.  Once she's dry will transition to  fyi   torsemide for discharge planning.      Interval History:  She's still labored with talking extensively, she feels better but still having issues with gardner and attempting to lie flat at night .  She had a few episodes of hard to breath however the oxygen improved her sensation.  Still with volume, RA oxygen still 88-89%.  Adding metolazone as a small booster.    Review of Systems   Constitutional: Positive for fatigue and unexpected weight change. Negative for activity change, appetite change, chills, diaphoresis and fever.   HENT: Negative for congestion, rhinorrhea, sinus pain, sinus pressure, sneezing, sore throat and trouble swallowing.    Eyes: Negative.    Respiratory: Positive for shortness of breath (improving). Negative for cough, chest tightness and wheezing.    Cardiovascular: Positive for leg swelling. Negative for chest pain and palpitations.   Gastrointestinal: Negative for abdominal distention, abdominal pain, constipation, diarrhea, nausea and vomiting.   Endocrine: Negative.    Genitourinary: Negative for decreased urine volume, difficulty urinating, dysuria, enuresis and urgency.   Musculoskeletal: Positive for back pain. Negative for arthralgias and myalgias.        Back spasms./ placido horse bilateral legs   Skin: Negative.    Allergic/Immunologic: Negative.    Neurological: Positive for weakness. Negative for dizziness, syncope, light-headedness and headaches.   Hematological: Negative.    Psychiatric/Behavioral: Positive for sleep disturbance. Negative for agitation and behavioral problems. The patient is not nervous/anxious.      Objective:     Vital Signs (Most Recent):  Temp: 98.1 °F (36.7 °C) (11/04/17 1931)  Pulse: 78 (11/04/17 1931)  Resp: 18 (11/04/17 1931)  BP: (!) 154/65 (11/04/17 1931)  SpO2: 95 % (11/04/17 1931) Vital Signs (24h Range):  Temp:  [98.1 °F (36.7 °C)-99.1 °F (37.3 °C)] 98.1 °F (36.7 °C)  Pulse:  [60-95] 78  Resp:  [16-18] 18  SpO2:  [93 %-98 %] 95 %  BP: (125-177)/(59-78)  154/65     Weight: 64.5 kg (142 lb 3.2 oz)  Body mass index is 23.66 kg/m².    Intake/Output Summary (Last 24 hours) at 11/04/17 2121  Last data filed at 11/04/17 1853   Gross per 24 hour   Intake             1130 ml   Output             2600 ml   Net            -1470 ml      Physical Exam   Constitutional: She is oriented to person, place, and time. She appears well-developed and well-nourished.   HENT:   Head: Normocephalic and atraumatic.   Right Ear: External ear normal.   Left Ear: External ear normal.   Nose: Nose normal.   Mouth/Throat: Mucous membranes are normal. Normal dentition.   Eyes: Conjunctivae, EOM and lids are normal.   Neck: Normal range of motion. Neck supple. Hepatojugular reflux and JVD present.   Cardiovascular: Normal rate, regular rhythm and intact distal pulses.  Exam reveals no gallop and no friction rub.    Murmur heard.  Pulmonary/Chest: Effort normal. She has rales in the right middle field, the right lower field and the left lower field. She exhibits no tenderness.   Abdominal: Soft. Bowel sounds are normal. She exhibits no distension. There is no tenderness.   Musculoskeletal: Normal range of motion. She exhibits edema (2-3+ BLE ). She exhibits no deformity.   Neurological: She is alert and oriented to person, place, and time. No cranial nerve deficit or sensory deficit. She exhibits normal muscle tone.   Skin: Skin is warm and dry. Capillary refill takes less than 2 seconds. No rash noted. No erythema.   Psychiatric: She has a normal mood and affect. Her behavior is normal. Judgment and thought content normal.   Nursing note and vitals reviewed.      Significant Labs:   CMP:     Recent Labs  Lab 11/03/17  0507 11/03/17  1636 11/04/17  0417 11/04/17  1933    140 142 139   K 4.1 3.5 4.0 3.4*   CL 97 94* 97 94*   CO2 36* 35* 36* 33*    129* 107 124*   BUN 22 23 21 25*   CREATININE 0.8 0.9 0.8 0.9   CALCIUM 9.2 9.5 9.2 9.4   PROT 6.3  --  6.5  --    ALBUMIN 3.1*  --  3.1*   --    BILITOT 1.1*  --  1.2*  --    ALKPHOS 68  --  69  --    AST 21  --  20  --    ALT 10  --  10  --    ANIONGAP 6* 11 9 12   EGFRNONAA >60.0 56.5* >60.0 56.5*      Magnesium:     Recent Labs  Lab 11/03/17  0507 11/04/17  0417   MG 2.0 1.8     Significant Imaging: I have reviewed and interpreted all pertinent imaging results/findings within the past 24 hours.    Assessment/Plan:      * Acute on chronic diastolic heart failure    - BNP elevated at 918, troponin negative and CBC/ CMP stable  - CXR with bilateral pleural effusion larger on the right side and atelectatic changes at the lung bases  - EKG with atrial fibrillation with paced ventricular rhythm  - Cardiology evaluated in ED, Interventional TAVR team consulted due to recent procedure  - 2D echo EF 50% mod MR, severe TR, PAP 40, RAP 15  - diuresis initiated with lasix 40 mg IVP BID, she's mobilizing her fluid faster than her heart and renal function can keep up, still with significant pulm edema, increased lasix to 60mg iv bid, with moderate improvement but still with pulm issues/ peripheral edema/ etc, increase to 60mg tid and finally added metolazone  2.5 mg x1 to add as a booster.    - admit weight 151 lbs, down to 142 lbs which is past her previously predicted dry weight and she's still volume up, it appears she's closer to 135 lbs dry weight.    - 15 beat run of VT, K 3.4.   - resume home BB and digoxin   - continue tele monitoring  - EKG PRN chest pain or arrhythmia  - hold triamterene HCTZ for now, while using IV diuresis        S/P TAVR (transcatheter aortic valve replacement)    - see above  -TAVR team have seen and this is to be expected, she's mobilizing her fluid faster than her heart and renal function can keep up        Complete heart block, post-surgical    - st christian device placed post TAVR  - pacer programming: Wound check done at bedside.   - Device/lead wnl.   - Educated pt on wound care, home monitoring, arm restrictions.   - Issued  Merlin home  monitor with instructions.   - F/u in clinic in 3mos with Dr. Delgado.        Atrial fibrillation    - rate control, warfarin           Long-term (current) use of anticoagulants, INR goal 2.0-3.0    - INR currently 2.2, warfarin  - daily PT/INR  - f/u with Dr. Ivy        Hx-TIA (transient ischemic attack)    -continue ASA        Hyperlipidemia    -continue statin         Controlled type 2 diabetes mellitus with microalbuminuria    -HgA1C 5.6 last in May 6.0  - diet controlled  -cardiac/diabetic diet in house        Non-productive cough    -satting 88-89%, requiring oxygen again, still with bilateral pulm edema  -duo nebs q8H  -repeat CXR post diuresis as necessary        Coronary artery disease involving native coronary artery of native heart without angina pectoris    -chest pain free, EKG without acute ischemic changes  -continue ASA, BB, and statin         Mild major depression    -continue home trazodone  -no SI/HI        DDD (degenerative disc disease), cervical    -PRN tylenol and oxycodone        Benign hypertensive heart disease with congestive heart failure    - continue home BB, digoxin, and hold triamterene /hctz for now  - BP elevated in ED, now improving, continue to monitor         Severe aortic stenosis by prior echocardiogram    -see above and HPI          VTE Risk Mitigation         Ordered     warfarin (COUMADIN) tablet 3 mg  Daily     Route:  Oral        11/03/17 1005     Medium Risk of VTE  Once      10/30/17 2102     Place JONO hose  Until discontinued      10/30/17 2102              Liyah Dover NP  Department of Hospital Medicine   [facility]  Spectra:  [phone]  Pager: [phone]

## 2020-09-09 DIAGNOSIS — Z20.822 SUSPECTED COVID-19 VIRUS INFECTION: ICD-10-CM

## 2020-09-09 DIAGNOSIS — E06.3 HYPOTHYROIDISM DUE TO HASHIMOTO'S THYROIDITIS: ICD-10-CM

## 2020-09-09 DIAGNOSIS — Z13.1 SCREENING FOR DIABETES MELLITUS: ICD-10-CM

## 2020-09-09 DIAGNOSIS — Z13.0 SCREENING FOR IRON DEFICIENCY ANEMIA: ICD-10-CM

## 2020-09-09 DIAGNOSIS — Z13.220 SCREENING FOR HYPERLIPIDEMIA: ICD-10-CM

## 2020-09-09 LAB
CHOLEST SERPL-MCNC: 214 MG/DL
GLUCOSE SERPL-MCNC: 80 MG/DL (ref 70–99)
HDLC SERPL-MCNC: 61 MG/DL
HGB BLD-MCNC: 12.6 G/DL (ref 11.7–15.7)
LDLC SERPL CALC-MCNC: 137 MG/DL
NONHDLC SERPL-MCNC: 153 MG/DL
TRIGL SERPL-MCNC: 78 MG/DL
TSH SERPL DL<=0.005 MIU/L-ACNC: 2.6 MU/L (ref 0.4–4)

## 2020-09-09 PROCEDURE — 80061 LIPID PANEL: CPT | Performed by: NURSE PRACTITIONER

## 2020-09-09 PROCEDURE — 86769 SARS-COV-2 COVID-19 ANTIBODY: CPT | Performed by: NURSE PRACTITIONER

## 2020-09-09 PROCEDURE — 36415 COLL VENOUS BLD VENIPUNCTURE: CPT | Performed by: NURSE PRACTITIONER

## 2020-09-09 PROCEDURE — 82947 ASSAY GLUCOSE BLOOD QUANT: CPT | Performed by: NURSE PRACTITIONER

## 2020-09-09 PROCEDURE — 85018 HEMOGLOBIN: CPT | Performed by: NURSE PRACTITIONER

## 2020-09-09 PROCEDURE — 84443 ASSAY THYROID STIM HORMONE: CPT | Performed by: NURSE PRACTITIONER

## 2020-09-10 LAB
COVID-19 SPIKE RBD ABY TITER: NORMAL
COVID-19 SPIKE RBD ABY: NEGATIVE

## 2020-09-11 NOTE — RESULT ENCOUNTER NOTE
Bernardino Davidson,    This note is to let you know the results of your recent lab studies.  Your blood count/hemoglobin, blood sugar/glucose, thyroid, and screening for COVID-19 antibodies all came back negative or normal.    Overall, your cholesterol panel looks great, but your total cholesterol and LDL cholesterol, bad fat, did come back slightly elevated from year ago.  Take good care of her self with healthy diet, low-fat diet, regular exercise etc.  I will plan to recheck your cholesterol panel in 1 year.    Massiel GUADALUPE CNP

## 2020-09-14 DIAGNOSIS — Z12.11 SCREENING FOR COLON CANCER: ICD-10-CM

## 2020-09-14 PROCEDURE — 82274 ASSAY TEST FOR BLOOD FECAL: CPT | Performed by: NURSE PRACTITIONER

## 2020-09-18 LAB — HEMOCCULT STL QL IA: NEGATIVE

## 2020-09-20 DIAGNOSIS — E06.3 HYPOTHYROIDISM DUE TO HASHIMOTO'S THYROIDITIS: ICD-10-CM

## 2020-09-20 DIAGNOSIS — Z00.00 ROUTINE GENERAL MEDICAL EXAMINATION AT A HEALTH CARE FACILITY: ICD-10-CM

## 2020-09-21 NOTE — RESULT ENCOUNTER NOTE
Bernardino Davidson,    This note is to let you know that the results of your recent FIT test came back negative or normal. I will have you repeat this test in one year.    If there is anything else that I can do for you, please do not hesitate to let me know.    Massiel Berger CNP

## 2020-09-22 ASSESSMENT — ENCOUNTER SYMPTOMS
SHORTNESS OF BREATH: 1
SORE THROAT: 0
MYALGIAS: 0
NERVOUS/ANXIOUS: 1
FEVER: 0
CONSTIPATION: 0
HEMATOCHEZIA: 0
WEAKNESS: 0
HEMATURIA: 0
DYSURIA: 0
PALPITATIONS: 0
HEADACHES: 1
EYE PAIN: 0
CHILLS: 0
FREQUENCY: 0
NAUSEA: 0
BREAST MASS: 0
DIZZINESS: 0
DIARRHEA: 1
HEARTBURN: 0
ABDOMINAL PAIN: 0
PARESTHESIAS: 0
COUGH: 0
JOINT SWELLING: 0
ARTHRALGIAS: 1

## 2020-09-23 ENCOUNTER — OFFICE VISIT (OUTPATIENT)
Dept: FAMILY MEDICINE | Facility: CLINIC | Age: 55
End: 2020-09-23
Payer: OTHER GOVERNMENT

## 2020-09-23 VITALS
DIASTOLIC BLOOD PRESSURE: 74 MMHG | WEIGHT: 105.2 LBS | RESPIRATION RATE: 16 BRPM | HEART RATE: 56 BPM | TEMPERATURE: 98.3 F | BODY MASS INDEX: 22.7 KG/M2 | SYSTOLIC BLOOD PRESSURE: 120 MMHG | HEIGHT: 57 IN | OXYGEN SATURATION: 99 %

## 2020-09-23 DIAGNOSIS — R45.86 MOOD SWINGS: ICD-10-CM

## 2020-09-23 DIAGNOSIS — Z12.39 SCREENING FOR BREAST CANCER: ICD-10-CM

## 2020-09-23 DIAGNOSIS — Z23 NEED FOR PROPHYLACTIC VACCINATION AND INOCULATION AGAINST INFLUENZA: ICD-10-CM

## 2020-09-23 DIAGNOSIS — Z00.00 ROUTINE GENERAL MEDICAL EXAMINATION AT A HEALTH CARE FACILITY: Primary | ICD-10-CM

## 2020-09-23 DIAGNOSIS — E06.3 HYPOTHYROIDISM DUE TO HASHIMOTO'S THYROIDITIS: ICD-10-CM

## 2020-09-23 PROCEDURE — 99214 OFFICE O/P EST MOD 30 MIN: CPT | Mod: 25 | Performed by: NURSE PRACTITIONER

## 2020-09-23 PROCEDURE — 99396 PREV VISIT EST AGE 40-64: CPT | Mod: 25 | Performed by: NURSE PRACTITIONER

## 2020-09-23 PROCEDURE — 90471 IMMUNIZATION ADMIN: CPT | Performed by: NURSE PRACTITIONER

## 2020-09-23 PROCEDURE — 90682 RIV4 VACC RECOMBINANT DNA IM: CPT | Performed by: NURSE PRACTITIONER

## 2020-09-23 RX ORDER — LEVOTHYROXINE SODIUM 112 UG/1
112 TABLET ORAL DAILY
Qty: 90 TABLET | Refills: 3 | Status: SHIPPED | OUTPATIENT
Start: 2020-09-23 | End: 2021-06-23

## 2020-09-23 ASSESSMENT — MIFFLIN-ST. JEOR: SCORE: 952.09

## 2020-09-23 ASSESSMENT — ENCOUNTER SYMPTOMS
HEMATURIA: 0
NERVOUS/ANXIOUS: 1
HEADACHES: 1
HEARTBURN: 0
FEVER: 0
CONSTIPATION: 0
ARTHRALGIAS: 1
PALPITATIONS: 0
EYE PAIN: 0
WEAKNESS: 0
CHILLS: 0
COUGH: 0
DIZZINESS: 0
SORE THROAT: 0
JOINT SWELLING: 0
ABDOMINAL PAIN: 0
DYSURIA: 0
DIARRHEA: 1
PARESTHESIAS: 0
FREQUENCY: 0
BREAST MASS: 0
MYALGIAS: 0
HEMATOCHEZIA: 0
SHORTNESS OF BREATH: 1
NAUSEA: 0

## 2020-09-23 NOTE — PROGRESS NOTES
SUBJECTIVE:   CC: Lety Alcantara is an 55 year old woman who presents for preventive health visit.       Patient has been advised of split billing requirements and indicates understanding: Yes  Healthy Habits:     Getting at least 3 servings of Calcium per day:  Yes    Bi-annual eye exam:  Yes    Dental care twice a year:  NO    Sleep apnea or symptoms of sleep apnea:  None    Diet:  Gluten-free/reduced    Frequency of exercise:  4-5 days/week    Duration of exercise:  45-60 minutes    Taking medications regularly:  Yes    Barriers to taking medications:  None    Medication side effects:  None    PHQ-2 Total Score: 0    Additional concerns today:  No    Today's PHQ-2 Score:   PHQ-2 ( 1999 Pfizer) 9/22/2020   Q1: Little interest or pleasure in doing things 0   Q2: Feeling down, depressed or hopeless 0   PHQ-2 Score 0   Q1: Little interest or pleasure in doing things Not at all   Q2: Feeling down, depressed or hopeless Not at all   PHQ-2 Score 0       Abuse: Current or Past (Physical, Sexual or Emotional) - No  Do you feel safe in your environment? Yes    Social History     Tobacco Use     Smoking status: Never Smoker     Smokeless tobacco: Never Used   Substance Use Topics     Alcohol use: No     If you drink alcohol do you typically have >3 drinks per day or >7 drinks per week? No    Alcohol Use 9/22/2020   Prescreen: >3 drinks/day or >7 drinks/week? Not Applicable   Prescreen: >3 drinks/day or >7 drinks/week? -   No flowsheet data found.    Reviewed orders with patient.  Reviewed health maintenance and updated orders accordingly - Yes  Lab work is in process  Labs reviewed in EPIC  BP Readings from Last 3 Encounters:   09/23/20 120/74   07/16/19 114/65   09/05/18 124/68    Wt Readings from Last 3 Encounters:   09/23/20 47.7 kg (105 lb 3.2 oz)   07/16/19 45.8 kg (101 lb)   09/05/18 44.5 kg (98 lb)            Patient Active Problem List   Diagnosis     Mood swings     Seizures (H)     Hypothyroidism due  to Hashimoto's thyroiditis     IUD (intrauterine device) in place, Paragard     Right-sided low back pain with right-sided sciatica, unspecified chronicity     Past Surgical History:   Procedure Laterality Date      SECTION  01       Social History     Tobacco Use     Smoking status: Never Smoker     Smokeless tobacco: Never Used   Substance Use Topics     Alcohol use: No     Family History   Problem Relation Age of Onset     Diabetes Brother      Coronary Artery Disease Father      Hypertension Father      Hyperlipidemia Father      Depression Father      Hypertension Mother      Hyperlipidemia Mother      Breast Cancer Mother      Other Cancer Mother      Depression Sister      Anxiety Disorder Sister      Depression Daughter      Anxiety Disorder Daughter      Asthma Daughter      Thyroid Disease Sister          Current Outpatient Medications   Medication Sig Dispense Refill     levothyroxine (SYNTHROID/LEVOTHROID) 112 MCG tablet Take 1 tablet (112 mcg) by mouth daily 90 tablet 3     sertraline (ZOLOFT) 50 MG tablet Take 1 tablet (50 mg) by mouth daily +++Medication is being filled for 1 time refill only due to:  Patient needs to be seen because it has been more than one year since last visit.+++ 90 tablet 0     Allergies   Allergen Reactions     Molds & Smuts Anaphylaxis     Seafood Anaphylaxis     Sulfa Drugs Rash     Recent Labs   Lab Test 20  0710 19  0929  18  0847   * 112*  --  126*   HDL 61 64  --  61   TRIG 78 76  --  60   ALT  --  28  --  33   CR  --  0.67  --  0.63   GFRESTIMATED  --  >90  --  >90   GFRESTBLACK  --  >90  --  >90   POTASSIUM  --  4.1  --  4.2   TSH 2.60 0.63   < > 0.10*    < > = values in this interval not displayed.        Mammogram Screening: Patient over age 50, mutual decision to screen reflected in health maintenance.    Pertinent mammograms are reviewed under the imaging tab.  History of abnormal Pap smear:   Last 3 Pap and HPV Results:   PAP  "/ HPV Latest Ref Rng & Units 6/6/2018 5/15/2015   PAP - NIL NIL   HPV 16 DNA NEG:Negative Negative Negative   HPV 18 DNA NEG:Negative Negative Negative   OTHER HR HPV NEG:Negative Negative Negative     PAP / HPV Latest Ref Rng & Units 6/6/2018 5/15/2015   PAP - NIL NIL   HPV 16 DNA NEG:Negative Negative Negative   HPV 18 DNA NEG:Negative Negative Negative   OTHER HR HPV NEG:Negative Negative Negative     Reviewed and updated as needed this visit by clinical staff  Tobacco  Allergies  Meds  Med Hx  Surg Hx  Fam Hx  Soc Hx        Reviewed and updated as needed this visit by Provider          SOB with activity, mild.  She is fit, works out regularly.  COVID like illness (antibody negative) early February 2020.  She has noticed more SOB with activity.  She is worried that this is an aftereffect of COVID.  Aerobic activity 5 times a week and she tolerates that activity well.  Today \"I am overthinking COVID19.  I just want you to listen to my heart and lungs until the erythema is okay.\"    Mood swings:  On sertraline.  Doing quite well.    She finds it is helpful.    She does not want to stop or adjust the dose of her medication.  She is requesting refills today.    Hashimoto's:  Taking her levothyroxine.  She denies any side effects or problems.  No symptoms of uncontrolled Hashimoto's.  She is requesting refills of her levothyroxine today.    Review of Systems   Constitutional: Negative for chills and fever.   HENT: Negative for congestion, ear pain, hearing loss and sore throat.    Eyes: Negative for pain and visual disturbance.   Respiratory: Positive for shortness of breath. Negative for cough.    Cardiovascular: Negative for chest pain, palpitations and peripheral edema.   Gastrointestinal: Positive for diarrhea. Negative for abdominal pain, constipation, heartburn, hematochezia and nausea.   Breasts:  Negative for tenderness, breast mass and discharge.   Genitourinary: Negative for dysuria, frequency, genital " "sores, hematuria, pelvic pain, urgency, vaginal bleeding and vaginal discharge.   Musculoskeletal: Positive for arthralgias. Negative for joint swelling and myalgias.   Skin: Negative for rash.   Neurological: Positive for headaches. Negative for dizziness, weakness and paresthesias.   Psychiatric/Behavioral: Negative for mood changes. The patient is nervous/anxious.         OBJECTIVE:   /74 (BP Location: Right arm, Patient Position: Sitting, Cuff Size: Adult Regular)   Pulse 56   Temp 98.3  F (36.8  C) (Tympanic)   Resp 16   Ht 1.441 m (4' 8.75\")   Wt 47.7 kg (105 lb 3.2 oz)   LMP 04/06/2018 (Approximate)   SpO2 99%   Breastfeeding No   BMI 22.97 kg/m    Physical Exam  GENERAL: healthy, alert and no distress  EYES: Eyes grossly normal to inspection, PERRL and conjunctivae and sclerae normal  HENT: ear canals and TM's normal, nose and mouth without ulcers or lesions  NECK: no adenopathy, no asymmetry, masses, or scars and thyroid normal to palpation  RESP: lungs clear to auscultation - no rales, rhonchi or wheezes  BREAST: normal without masses, tenderness or nipple discharge and no palpable axillary masses or adenopathy  CV: regular rate and rhythm, normal S1 S2, no S3 or S4, no murmur, click or rub, no peripheral edema and peripheral pulses strong  ABDOMEN: soft, nontender, no hepatosplenomegaly, no masses and bowel sounds normal  MS: no gross musculoskeletal defects noted, no edema  SKIN: no suspicious lesions or rashes  NEURO: Normal strength and tone, mentation intact and speech normal  PSYCH: mentation appears normal, affect normal/bright    Diagnostic Test Results:  Labs reviewed in Epic  Labs done today, results pending    ASSESSMENT/PLAN:   (Z00.00) Routine general medical examination at a health care facility  (primary encounter diagnosis)  Comment:   Plan:  MA Screening         Digital Bilateral        I reassured the patient today that her physical exam is normal.  She was reassured and " "happy.    (R45.86) Mood swings  Comment: Controlled  Plan: sertraline (ZOLOFT) 50 MG tablet        Refills are given.  I encouraged her to continue her sertraline every day as prescribed.  I encouraged her to continue her regular aerobic activity, healthy self cares including but not limited to diet, fluid intake, exercise etc.  Yearly clinic appointments with me for recheck and refills, sooner problems.    (E03.8,  E06.3) Hypothyroidism due to Hashimoto's thyroiditis  Comment: Chronic  Plan: Refills given.    Labs done today, results pending.  Yearly clinic appointments for recheck and refills, sooner problems.      (Z12.39) Screening for breast cancer  Comment:  Plan: MA Screening Digital Bilateral        Routine screening mammogram at earliest convenience    (Z23) Need for prophylactic vaccination and inoculation against influenza  Comment: Routine  Plan: INFLUENZA QUAD, RECOMBINANT, P-FREE (RIV4)         (FLUBLOCK) [97684]        Done today    Patient has been advised of split billing requirements and indicates understanding: Yes  COUNSELING:  Reviewed preventive health counseling, as reflected in patient instructions    Estimated body mass index is 22.97 kg/m  as calculated from the following:    Height as of this encounter: 1.441 m (4' 8.75\").    Weight as of this encounter: 47.7 kg (105 lb 3.2 oz).      She reports that she has never smoked. She has never used smokeless tobacco.      Counseling Resources:  ATP IV Guidelines  Pooled Cohorts Equation Calculator  Breast Cancer Risk Calculator  BRCA-Related Cancer Risk Assessment: FHS-7 Tool  FRAX Risk Assessment  ICSI Preventive Guidelines  Dietary Guidelines for Americans, 2010  USDA's MyPlate  ASA Prophylaxis  Lung CA Screening    ANAYELI Cruz CNP  [facility]  "

## 2021-02-18 ENCOUNTER — MYC MEDICAL ADVICE (OUTPATIENT)
Dept: FAMILY MEDICINE | Facility: CLINIC | Age: 56
End: 2021-02-18

## 2021-02-25 ENCOUNTER — MYC MEDICAL ADVICE (OUTPATIENT)
Dept: FAMILY MEDICINE | Facility: CLINIC | Age: 56
End: 2021-02-25

## 2021-02-25 NOTE — TELEPHONE ENCOUNTER
Writer responded via [employer].  NIHARIKA AnguianoN, RN  [facility]

## 2021-02-25 NOTE — TELEPHONE ENCOUNTER
Please advise.  Patient was advised to set up a virtual visit but is asking for PCP input.  Beryl Carr RN  [facility]

## 2021-02-25 NOTE — TELEPHONE ENCOUNTER
Writer responded via [employer].  NIHARIKA AnguianoN, RN  [facility]

## 2021-03-03 ENCOUNTER — OFFICE VISIT (OUTPATIENT)
Dept: FAMILY MEDICINE | Facility: CLINIC | Age: 56
End: 2021-03-03
Payer: OTHER GOVERNMENT

## 2021-03-03 VITALS
HEART RATE: 53 BPM | RESPIRATION RATE: 16 BRPM | SYSTOLIC BLOOD PRESSURE: 112 MMHG | WEIGHT: 112 LBS | TEMPERATURE: 96.9 F | HEIGHT: 57 IN | DIASTOLIC BLOOD PRESSURE: 70 MMHG | OXYGEN SATURATION: 96 % | BODY MASS INDEX: 24.16 KG/M2

## 2021-03-03 DIAGNOSIS — K90.41 WHEAT INTOLERANCE: ICD-10-CM

## 2021-03-03 DIAGNOSIS — F41.1 GAD (GENERALIZED ANXIETY DISORDER): Primary | ICD-10-CM

## 2021-03-03 PROCEDURE — 99214 OFFICE O/P EST MOD 30 MIN: CPT | Performed by: NURSE PRACTITIONER

## 2021-03-03 RX ORDER — BUSPIRONE HYDROCHLORIDE 5 MG/1
5 TABLET ORAL 2 TIMES DAILY
Qty: 60 TABLET | Refills: 2 | Status: SHIPPED | OUTPATIENT
Start: 2021-03-03 | End: 2021-03-29

## 2021-03-03 RX ORDER — COPPER 313.4 MG/1
1 INTRAUTERINE DEVICE INTRAUTERINE ONCE
Status: ON HOLD | COMMUNITY
End: 2021-08-02

## 2021-03-03 ASSESSMENT — MIFFLIN-ST. JEOR: SCORE: 982.94

## 2021-03-03 NOTE — PATIENT INSTRUCTIONS
Patient Education     Celiac Disease    Celiac disease is caused by an immune-based reaction to gluten in food. Gluten is a protein found in many grains such as wheat, barley, and rye. Celiac disease affects tiny, fingerlike stalks (villi) in the small bowel (intestine). Normally, the villi make it possible for the small bowel to absorb nutrients from the food you eat. But celiac disease damages the villi. As a result, you can t absorb the nutrients you need, even if you eat plenty of food. Celiac disease is an autoimmune disease. You can manage the disease by removing gluten from your diet. This relieves your symptoms. It also reverses the damage to your small bowel. Celiac disease is sometimes called celiac sprue.   Causes of celiac disease  Celiac disease may have a genetic component. This means it can be passed down in families. If your healthcare provider thinks that you have celiac disease, he or she may advise that other members of your family be checked for it as well.   Symptoms of celiac disease   The symptoms of celiac disease can vary for each person. Some people have no symptoms at all. If symptoms do happen, they can include:     Diarrhea, constipation, or both    Light colored, foul-smelling or fatty stool    Belly pain and cramping    Belly swelling or bloating    Weight loss    Bone or joint pain    Iron deficiency    Headaches    Tiredness and loss of energy    Mood changes, irritability, and depression    Infertility    Unexplained elevated liver tests    Canker sores    Skin rash    Tooth enamel problems  Diagnosing celiac disease  Your healthcare provider will ask about your symptoms and health history. You ll also have a physical exam. Tests are then done to confirm the problem. These can include:     Blood tests. These help check for specific proteins in the blood that are present with celiac disease. They also check for anemia and help rule out other problems. The tests are done by taking a  blood sample.    Upper endoscopy with biopsy. This is done to see inside the stomach and duodenum (first part of the small bowel). For the test, an endoscope is used. This is a thin, flexible tube with a tiny camera on the end. It s inserted through the mouth and down into the stomach and duodenum. Tools are passed through the endoscope to remove tiny tissue samples (biopsy). The tissue samples are taken to a lab and looked at under a microscope. This is to check the tiny villi for damage. This test must be done while you are still eating food with gluten. This is the only way to see whether the presence of gluten is damaging the villi.    Genetic tests. These check for problems with specific genes linked to celiac disease. They are done by taking blood samples.  Treating celiac disease  To treat celiac disease, you must remove all sources of gluten from your diet. This will allow the villi to heal, so that nutrients can be absorbed normally. It s important to follow a strict, gluten-free diet daily, even if you don t have symptoms. If you don t do this, the small bowel can become permanently damaged, which can lead to serious health problems. These include bone disease, cancer of the small bowel, and various nervous system disorders. You may need to take certain vitamins if your levels are low. Your doctor may want to recheck your intestine with an upper endoscopy or repeat the celiac blood tests to make sure the tissue has healed.   Sources of gluten  Gluten is found in wheat, barley, and rye. The most common foods with gluten are those made with wheat flour. These include bread, pasta, cake, and cereal. Gluten is also often found in beer, gravies, salad dressings, and most packaged foods. It's even found in some nonfood products, such as certain medicines and cosmetics. Your healthcare provider can refer you to a dietitian to  you about what you should avoid. The resources below will also give you lists of  food and products that contain gluten.   Follow-up  You ll meet with your healthcare provider periodically to monitor your health. During these visits, routine blood tests are often done to make sure your condition is under control. Your healthcare provider can also refer you to other healthcare providers or support and advocacy groups to help you cope with your condition.   Learning more about celiac disease  The following resources can help you learn more about celiac disease and how to manage it:     Celiac Disease Foundation , www.celiac.org    National Celiac Association , www.csaceliacs.org    Gluten Intolerance Group , www.gluten.org    [employer] and [employer]estive [employer] Kidney [employer] , www.niddk.nih.gov  [employer] last reviewed this educational content on 6/1/2019 2000-2020 [employer]. All rights reserved. This information is not intended as a substitute for professional medical care. Always follow your healthcare professional's instructions.

## 2021-03-03 NOTE — PROGRESS NOTES
Assessment & Plan     (F41.1) AMBROSE (generalized anxiety disorder)  (primary encounter diagnosis)  Comment: Worse  Plan: busPIRone (BUSPAR) 5 MG tablet        Today, I do want the patient to continue her sertraline 50 mg a day.  I did consider increasing her dose but instead she wishes to start BuSpar.  I do think that is appropriate.  I recommend that she take BuSpar 1 tablet daily for 3 to 5 days and then take 1 tablet twice a day.  Goal for her would be 1 tablet twice a day.  In the event that she has side effects or problems with the BuSpar, I did tell her to go ahead and stop the BuSpar and let me know.  She is due to come into the clinic to see me June 2021 for her full physical with Pap and I will plan to do a med check recheck at that time.  She is to let me know sooner if she has any problems, questions or concerns.    (K90.41) Wheat intolerance  Comment: History of  Plan: I did talk with the patient at length about gluten intolerance, celiac disease, and the differences.  For today, it does sound like Lety has a threshold of gluten intolerance as she is able to eat small amounts and does just fine.  I did tell her to continue to monitor for gluten intolerance and if her symptoms worsen anyway, I did talk with her about considering an upper GI endoscopy as she is also due a colonoscopy for colon cancer screening.    She is not willing to do these at this time but she will talk about again at her physical.  If with her gluten intake she develops fever, chills, abdominal pain, diarrhea, blood in her stool, or any other concerning symptoms, she is to quit meat intake and let me know.  We did consider a consultation with GI but with that as well.  Healthy self care is encouraged.       See Patient Instructions    Return in about 3 months (around 6/3/2021) for Physical Exam.    ANAYELI Cruz [facility]    Sailaja Davidson is a 55 year old who presents for  "the following health issues:  HPI     Chief Complaint   Patient presents with     Anxiety     Gastrointestinal Problem     Celiac Disease      testing       Denies has a History of stomach issues 10 yeas ago.  She quit wheat/gluten at that time and she quit drinking alcohol.  Her stomach has felt much better and has felt great for 10 years.  For the past month, she decided to \"test it.\"  She did resume wheat products a month ago, small amounts, and her stomach seems to be doing fine.  She denies any abdominal pain, diarrhea, constipation, blood in her stool etc.  She denies fever, chills, night sweats.  She is wondering if she should be tested for a wheat intolerance.    Anxiety:  Lety has been on Zoloft for years for her mood.  Recently, her anxiety has increased.  Recently switched jobs.  She has some family stressors.  \"my anxiety is bothering me now.\"  Having difficulty sleeping.  Can't shut her brain down.  Sweaty, heart racy at times.  \"my sister is on buspar and it is helpful for her.\"  Lety is requesting to continue her Zoloft but she is wondering if this pill would be appropriate for her.  She is not currently in therapy and she does not feel like she needs it.    Seizure:  Last seizure was \"years ago.\"            Review of Systems   Constitutional, HEENT, cardiovascular, pulmonary, GI, , musculoskeletal, neuro, skin, endocrine and psych systems are negative, except as otherwise noted.      Objective    /70 (BP Location: Left arm, Patient Position: Sitting, Cuff Size: Adult Regular)   Pulse 53   Temp 96.9  F (36.1  C) (Temporal)   Resp 16   Ht 1.441 m (4' 8.75\")   Wt 50.8 kg (112 lb)   LMP 04/06/2018 (Approximate)   SpO2 96%   BMI 24.45 kg/m    Body mass index is 24.45 kg/m .  Physical Exam   GENERAL: healthy, alert and no distress  EYES: Eyes grossly normal to inspection, PERRL and conjunctivae and sclerae normal  NECK: no adenopathy and thyroid normal to palpation  RESP: lungs clear to " auscultation - no rales, rhonchi or wheezes  CV: regular rate and rhythm, normal S1 S2, no S3 or S4, no murmur, click or rub, no peripheral edema and peripheral pulses strong  ABDOMEN: soft, nontender, no hepatosplenomegaly, no masses and bowel sounds normal. No rebound or guarding.   MS: no gross musculoskeletal defects noted, no edema. Ambulatory with a steady gait.   SKIN: warm and dry  NEURO: Normal strength and tone, mentation intact and speech normal  PSYCH: mentation appears normal, affect normal/bright    Diagnostics:  Reviewed in epic.  Diagnostics considered today but waived.

## 2021-03-27 ENCOUNTER — HEALTH MAINTENANCE LETTER (OUTPATIENT)
Age: 56
End: 2021-03-27

## 2021-05-13 DIAGNOSIS — M25.562 LEFT KNEE PAIN, UNSPECIFIED CHRONICITY: Primary | ICD-10-CM

## 2021-05-13 NOTE — PROGRESS NOTES
Patient is scheduled with Dr Vogel on 5/28/21 for left knee pain.  No prior imaging completed.    X-ray orders placed per protocol.     Patient should present [facility] for XR prior to arriving to Sports Med appointment.        Dejon Espino ATC

## 2021-06-17 ENCOUNTER — MYC MEDICAL ADVICE (OUTPATIENT)
Dept: FAMILY MEDICINE | Facility: CLINIC | Age: 56
End: 2021-06-17

## 2021-06-17 DIAGNOSIS — E78.00 ELEVATED CHOLESTEROL: ICD-10-CM

## 2021-06-17 DIAGNOSIS — Z13.1 SCREENING FOR DIABETES MELLITUS: ICD-10-CM

## 2021-06-17 DIAGNOSIS — E06.3 HYPOTHYROIDISM DUE TO HASHIMOTO'S THYROIDITIS: Primary | ICD-10-CM

## 2021-06-17 NOTE — TELEPHONE ENCOUNTER
I do not see TSH on Health Maintenance though atinet is on medication.  Pended TSH as requested.  TSH   Date Value Ref Range Status   09/09/2020 2.60 0.40 - 4.00 mU/L Final

## 2021-06-20 ASSESSMENT — ENCOUNTER SYMPTOMS
BREAST MASS: 0
HEMATURIA: 0
HEARTBURN: 0
PALPITATIONS: 0
FEVER: 0
SORE THROAT: 0
CHILLS: 0
JOINT SWELLING: 0
HEMATOCHEZIA: 0
HEADACHES: 0
FREQUENCY: 0
SHORTNESS OF BREATH: 1
DIARRHEA: 0
NAUSEA: 0
NERVOUS/ANXIOUS: 0
ABDOMINAL PAIN: 0
WEAKNESS: 0
DIZZINESS: 0
PARESTHESIAS: 0
CONSTIPATION: 0
EYE PAIN: 0
COUGH: 0
DYSURIA: 0
MYALGIAS: 1
ARTHRALGIAS: 1

## 2021-06-22 DIAGNOSIS — E06.3 HYPOTHYROIDISM DUE TO HASHIMOTO'S THYROIDITIS: ICD-10-CM

## 2021-06-22 DIAGNOSIS — Z13.1 SCREENING FOR DIABETES MELLITUS: ICD-10-CM

## 2021-06-22 DIAGNOSIS — E78.00 ELEVATED CHOLESTEROL: ICD-10-CM

## 2021-06-22 LAB
ALBUMIN SERPL-MCNC: 4 G/DL (ref 3.4–5)
ALP SERPL-CCNC: 74 U/L (ref 40–150)
ALT SERPL W P-5'-P-CCNC: 25 U/L (ref 0–50)
ANION GAP SERPL CALCULATED.3IONS-SCNC: 5 MMOL/L (ref 3–14)
AST SERPL W P-5'-P-CCNC: 25 U/L (ref 0–45)
BILIRUB SERPL-MCNC: 0.3 MG/DL (ref 0.2–1.3)
BUN SERPL-MCNC: 15 MG/DL (ref 7–30)
CALCIUM SERPL-MCNC: 9.5 MG/DL (ref 8.5–10.1)
CHLORIDE SERPL-SCNC: 104 MMOL/L (ref 94–109)
CHOLEST SERPL-MCNC: 244 MG/DL
CO2 SERPL-SCNC: 26 MMOL/L (ref 20–32)
CREAT SERPL-MCNC: 0.76 MG/DL (ref 0.52–1.04)
GFR SERPL CREATININE-BSD FRML MDRD: 88 ML/MIN/{1.73_M2}
GLUCOSE SERPL-MCNC: 94 MG/DL (ref 70–99)
HDLC SERPL-MCNC: 64 MG/DL
LDLC SERPL CALC-MCNC: 163 MG/DL
NONHDLC SERPL-MCNC: 180 MG/DL
POTASSIUM SERPL-SCNC: 4.8 MMOL/L (ref 3.4–5.3)
PROT SERPL-MCNC: 7.9 G/DL (ref 6.8–8.8)
SODIUM SERPL-SCNC: 135 MMOL/L (ref 133–144)
TRIGL SERPL-MCNC: 86 MG/DL
TSH SERPL DL<=0.005 MIU/L-ACNC: 3.48 MU/L (ref 0.4–4)

## 2021-06-22 PROCEDURE — 80061 LIPID PANEL: CPT | Performed by: NURSE PRACTITIONER

## 2021-06-22 PROCEDURE — 80053 COMPREHEN METABOLIC PANEL: CPT | Performed by: NURSE PRACTITIONER

## 2021-06-22 PROCEDURE — 36415 COLL VENOUS BLD VENIPUNCTURE: CPT | Performed by: NURSE PRACTITIONER

## 2021-06-22 PROCEDURE — 84443 ASSAY THYROID STIM HORMONE: CPT | Performed by: NURSE PRACTITIONER

## 2021-06-22 NOTE — RESULT ENCOUNTER NOTE
Bernardino Davidson,    This note is to let you know the results of your recent lab studies.    Your thyroid, kidney function, liver function, electrolytes, blood sugar, and calcium levels are all normal.    Your cholesterol levels are mildly elevated. I recommend a diet low in fat and cholesterol as well as regular aerobic activity. I would like to recheck your cholesterol in one year.    Let me know if you have any questions.    Massiel GUADALUPE CNP

## 2021-06-23 ENCOUNTER — OFFICE VISIT (OUTPATIENT)
Dept: FAMILY MEDICINE | Facility: CLINIC | Age: 56
End: 2021-06-23
Payer: OTHER GOVERNMENT

## 2021-06-23 VITALS
BODY MASS INDEX: 23.76 KG/M2 | RESPIRATION RATE: 16 BRPM | DIASTOLIC BLOOD PRESSURE: 60 MMHG | HEART RATE: 56 BPM | TEMPERATURE: 98.4 F | WEIGHT: 110.12 LBS | OXYGEN SATURATION: 99 % | SYSTOLIC BLOOD PRESSURE: 106 MMHG | HEIGHT: 57 IN

## 2021-06-23 DIAGNOSIS — Z00.00 ROUTINE GENERAL MEDICAL EXAMINATION AT A HEALTH CARE FACILITY: Primary | ICD-10-CM

## 2021-06-23 DIAGNOSIS — M25.50 MULTIPLE JOINT PAIN: ICD-10-CM

## 2021-06-23 DIAGNOSIS — R53.83 FATIGUE, UNSPECIFIED TYPE: ICD-10-CM

## 2021-06-23 DIAGNOSIS — Z12.31 ENCOUNTER FOR SCREENING MAMMOGRAM FOR MALIGNANT NEOPLASM OF BREAST: ICD-10-CM

## 2021-06-23 DIAGNOSIS — Z97.5 IUD (INTRAUTERINE DEVICE) IN PLACE: ICD-10-CM

## 2021-06-23 DIAGNOSIS — Z12.4 SCREENING FOR CERVICAL CANCER: ICD-10-CM

## 2021-06-23 DIAGNOSIS — R45.86 MOOD SWINGS: ICD-10-CM

## 2021-06-23 DIAGNOSIS — F41.1 GAD (GENERALIZED ANXIETY DISORDER): ICD-10-CM

## 2021-06-23 DIAGNOSIS — E06.3 HYPOTHYROIDISM DUE TO HASHIMOTO'S THYROIDITIS: ICD-10-CM

## 2021-06-23 PROCEDURE — 87624 HPV HI-RISK TYP POOLED RSLT: CPT | Performed by: NURSE PRACTITIONER

## 2021-06-23 PROCEDURE — 99214 OFFICE O/P EST MOD 30 MIN: CPT | Mod: 25 | Performed by: NURSE PRACTITIONER

## 2021-06-23 PROCEDURE — G0123 SCREEN CERV/VAG THIN LAYER: HCPCS | Performed by: NURSE PRACTITIONER

## 2021-06-23 PROCEDURE — 99396 PREV VISIT EST AGE 40-64: CPT | Performed by: NURSE PRACTITIONER

## 2021-06-23 RX ORDER — DIAZEPAM 2 MG
TABLET ORAL
Qty: 1 TABLET | Refills: 0 | Status: SHIPPED | OUTPATIENT
Start: 2021-06-23 | End: 2021-07-26

## 2021-06-23 RX ORDER — BUSPIRONE HYDROCHLORIDE 5 MG/1
5 TABLET ORAL 2 TIMES DAILY
Qty: 180 TABLET | Refills: 3 | Status: SHIPPED | OUTPATIENT
Start: 2021-06-23 | End: 2022-06-30

## 2021-06-23 RX ORDER — LEVOTHYROXINE SODIUM 112 UG/1
112 TABLET ORAL DAILY
Qty: 90 TABLET | Refills: 3 | Status: SHIPPED | OUTPATIENT
Start: 2021-06-23 | End: 2022-08-30

## 2021-06-23 ASSESSMENT — ENCOUNTER SYMPTOMS
DYSURIA: 0
BREAST MASS: 0
COUGH: 0
EYE PAIN: 0
CHILLS: 0
WEAKNESS: 0
ARTHRALGIAS: 1
NERVOUS/ANXIOUS: 0
JOINT SWELLING: 0
ABDOMINAL PAIN: 0
SORE THROAT: 0
FREQUENCY: 0
HEARTBURN: 0
CONSTIPATION: 0
HEMATOCHEZIA: 0
PARESTHESIAS: 0
HEMATURIA: 0
PALPITATIONS: 0
SHORTNESS OF BREATH: 1
HEADACHES: 0
FEVER: 0
MYALGIAS: 1
DIARRHEA: 0
NAUSEA: 0
DIZZINESS: 0

## 2021-06-23 ASSESSMENT — MIFFLIN-ST. JEOR: SCORE: 969.41

## 2021-06-23 NOTE — PATIENT INSTRUCTIONS
For the IUD removal, take the valium, 1-2 tablets 60 minutes before the procedure.  You cannot drive with this medication so you will need a  for your appointment.  Be seen by the ob/gyn for the IUD removal.    Mental health/fatigue:  Continue your meds, healthy  Diet, sleep, exercise, etc.  Follow up in one year, sooner if your fatigue does not improve or if you have any worsening.            Preventive Health Recommendations  Female Ages 50 - 64    Yearly exam: See your health care provider every year in order to  o Review health changes.   o Discuss preventive care.    o Review your medicines if your doctor has prescribed any.      Get a Pap test every three years (unless you have an abnormal result and your provider advises testing more often).    If you get Pap tests with HPV test, you only need to test every 5 years, unless you have an abnormal result.     You do not need a Pap test if your uterus was removed (hysterectomy) and you have not had cancer.    You should be tested each year for STDs (sexually transmitted diseases) if you're at risk.     Have a mammogram every 1 to 2 years.    Have a colonoscopy at age 50, or have a yearly FIT test (stool test). These exams screen for colon cancer.      Have a cholesterol test every 5 years, or more often if advised.    Have a diabetes test (fasting glucose) every three years. If you are at risk for diabetes, you should have this test more often.     If you are at risk for osteoporosis (brittle bone disease), think about having a bone density scan (DEXA).    Shots: Get a flu shot each year. Get a tetanus shot every 10 years.    Nutrition:     Eat at least 5 servings of fruits and vegetables each day.    Eat whole-grain bread, whole-wheat pasta and brown rice instead of white grains and rice.    Get adequate Calcium and Vitamin D.     Lifestyle    Exercise at least 150 minutes a week (30 minutes a day, 5 days a week). This will help you control your weight and  prevent disease.    Limit alcohol to one drink per day.    No smoking.     Wear sunscreen to prevent skin cancer.     See your dentist every six months for an exam and cleaning.    See your eye doctor every 1 to 2 years.

## 2021-06-23 NOTE — PROGRESS NOTES
SUBJECTIVE:   CC: Lety Alcantara is an 56 year old woman who presents for preventive health visit.       Healthy Habits:     Getting at least 3 servings of Calcium per day:  Yes    Bi-annual eye exam:  Yes    Dental care twice a year:  NO    Sleep apnea or symptoms of sleep apnea:  Daytime drowsiness    Diet:  Regular (no restrictions)    Frequency of exercise:  6-7 days/week    Duration of exercise:  45-60 minutes    Taking medications regularly:  Yes    Medication side effects:  None    PHQ-2 Total Score: 2    Additional concerns today:  Yes      Today's PHQ-2 Score:   PHQ-2 ( 1999 Pf[employer]) 6/20/2021   Q1: Little interest or pleasure in doing things 1   Q2: Feeling down, depressed or hopeless 1   PHQ-2 Score 2   Q1: Little interest or pleasure in doing things Several days   Q2: Feeling down, depressed or hopeless Several days   PHQ-2 Score 2       Abuse: Current or Past (Physical, Sexual or Emotional) - No  Do you feel safe in your environment? Yes    Have you ever done Advance Care Planning? (For example, a Health Directive, POLST, or a discussion with a medical provider or your loved ones about your wishes): No, advance care planning information given to patient to review.  Patient plans to discuss their wishes with loved ones or provider.      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 No    Alcohol Use 6/23/2021   Prescreen: >3 drinks/day or >7 drinks/week? -   Prescreen: >3 drinks/day or >7 drinks/week? No   No flowsheet data found.    Reviewed orders with patient.  Reviewed health maintenance and updated orders accordingly - Yes  Lab work is in process  Labs reviewed in EPIC  BP Readings from Last 3 Encounters:   06/23/21 106/60   03/03/21 112/70   09/23/20 120/74     Wt Readings from Last 3 Encounters:   21 50 kg (110 lb 1.9 oz)   21 50.8 kg (112 lb)   20 47.7 kg (105 lb 3.2 oz)                  Patient Active Problem List   Diagnosis     Mood swings     Seizures (H)     Hypothyroidism due to Hashimoto's thyroiditis     IUD (intrauterine device) in place, Paragard     Right-sided low back pain with right-sided sciatica, unspecified chronicity     Past Surgical History:   Procedure Laterality Date      SECTION  01       Social History     Tobacco Use     Smoking status: Never Smoker     Smokeless tobacco: Never Used   Substance Use Topics     Alcohol use: Not Currently     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     Family History   Problem Relation Age of Onset     Diabetes Brother      Coronary Artery Disease Father      Hypertension Father      Hyperlipidemia Father      Depression Father      Hypertension Mother      Hyperlipidemia Mother      Breast Cancer Mother         over 70 when had breast cancer     Other Cancer Mother      Depression Sister      Anxiety Disorder Sister      Depression Daughter      Anxiety Disorder Daughter      Asthma Daughter      Thyroid Disease Sister          Current Outpatient Medications   Medication Sig Dispense Refill     busPIRone (BUSPAR) 5 MG tablet Take 1 tablet (5 mg) by mouth 2 times daily 180 tablet 3     diazepam (VALIUM) 2 MG tablet Take 1-2 tablets 60 minutes before the IUD removal 1 tablet 0     levothyroxine (SYNTHROID/LEVOTHROID) 112 MCG tablet Take 1 tablet (112 mcg) by mouth daily 90 tablet 3     paragard intrauterine copper device 1 each by Intrauterine route once       sertraline (ZOLOFT) 50 MG tablet Take 1 tablet (50 mg) by mouth daily 90 tablet 3     Allergies   Allergen Reactions     Molds & Smuts Anaphylaxis     Seafood Anaphylaxis     Sulfa Drugs Rash     Recent Labs   Lab Test 21  0720 20  0710 19  0929 18  0847 18  0847   * 137* 112*  --  126*   HDL  "64 61 64  --  61   TRIG 86 78 76  --  60   ALT 25  --  28  --  33   CR 0.76  --  0.67  --  0.63   GFRESTIMATED 88  --  >90  --  >90   GFRESTBLACK >90  --  >90  --  >90   POTASSIUM 4.8  --  4.1  --  4.2   TSH 3.48 2.60 0.63   < > 0.10*    < > = values in this interval not displayed.        Breast Cancer Screening:    FSH-7:   Breast CA Risk Assessment (FHS-7) 6/20/2021   Did any of your first-degree relatives have breast or ovarian cancer? Yes   Did any of your relatives have bilateral breast cancer? No   Did any man in your family have breast cancer? No   Did any woman in your family have breast and ovarian cancer? No   Did any woman in your family have breast cancer before age 50 y? No   Do you have 2 or more relatives with breast and/or ovarian cancer? No   Do you have 2 or more relatives with breast and/or bowel cancer? No       Pertinent mammograms are reviewed under the imaging tab.    History of abnormal Pap smear:   Last 3 Pap and HPV Results:   PAP / HPV Latest Ref Rng & Units 6/6/2018 5/15/2015   PAP - NIL NIL   HPV 16 DNA NEG:Negative Negative Negative   HPV 18 DNA NEG:Negative Negative Negative   OTHER HR HPV NEG:Negative Negative Negative     PAP / HPV Latest Ref Rng & Units 6/6/2018 5/15/2015   PAP - NIL NIL   HPV 16 DNA NEG:Negative Negative Negative   HPV 18 DNA NEG:Negative Negative Negative   OTHER HR HPV NEG:Negative Negative Negative     Reviewed and updated as needed this visit by clinical staff  Tobacco  Allergies  Meds   Med Hx  Surg Hx  Fam Hx  Soc Hx        Reviewed and updated as needed this visit by Provider                    Fatigued.  Trouble with motivation.  Some joint pain/muscle aches.    Diet:  Trying to eat healthy.  \"I started eating gluten again.\"  Because of her joint pain/muscle aches/fatigue, she is going to stop eating gluten again.      Thyroid:  On levothyroxine.  Tsh level yesterday normal.  She is requesting refills of her levothyroxine.    Mental health:  On " "sertraline and buspar.  Going well.      IUD:  Has been in place for almost 20 years.  She is postmenopausal, last menstrual period a couple years ago.  She is requesting IUD removal today.    Review of Systems   Constitutional: Negative for chills and fever.   HENT: Negative for congestion, ear pain, hearing loss and sore throat.    Eyes: Negative for pain and visual disturbance.   Respiratory: Positive for shortness of breath. Negative for cough.    Cardiovascular: Negative for chest pain, palpitations and peripheral edema.   Gastrointestinal: Negative for abdominal pain, constipation, diarrhea, heartburn, hematochezia and nausea.   Breasts:  Negative for tenderness, breast mass and discharge.   Genitourinary: Negative for dysuria, frequency, genital sores, hematuria, pelvic pain, urgency, vaginal bleeding and vaginal discharge.   Musculoskeletal: Positive for arthralgias and myalgias. Negative for joint swelling.   Skin: Negative for rash.   Neurological: Negative for dizziness, weakness, headaches and paresthesias.   Psychiatric/Behavioral: Positive for mood changes. The patient is not nervous/anxious.         OBJECTIVE:   /60 (BP Location: Right arm, Patient Position: Sitting, Cuff Size: Adult Regular)   Pulse 56   Temp 98.4  F (36.9  C) (Temporal)   Resp 16   Ht 1.441 m (4' 8.75\")   Wt 50 kg (110 lb 1.9 oz)   LMP 04/06/2018 (Approximate)   SpO2 99%   Breastfeeding No   BMI 24.04 kg/m    Physical Exam  GENERAL: healthy, alert and no distress  EYES: Eyes grossly normal to inspection, PERRL and conjunctivae and sclerae normal  HENT: ear canals and TM's normal, nose and mouth without ulcers or lesions  NECK: no adenopathy, no asymmetry, masses, or scars and thyroid normal to palpation  RESP: lungs clear to auscultation - no rales, rhonchi or wheezes  BREAST: normal without masses, tenderness or nipple discharge and no palpable axillary masses or adenopathy  CV: regular rate and rhythm, normal S1 S2, " no S3 or S4, no murmur, click or rub, no peripheral edema and peripheral pulses strong  ABDOMEN: soft, nontender, no hepatosplenomegaly, no masses and bowel sounds normal   (female): normal female external genitalia, normal urethral meatus, vaginal mucosa pink, moist, well rugated, and normal cervix/adnexa/uterus without masses or discharge. IUD strings visible.  The patient was very uncomfortable with the speculum exam and she ceased the pelvic exam before the IUD could be removed.      MS: no gross musculoskeletal defects noted, no edema  SKIN: no suspicious lesions or rashes  NEURO: Normal strength and tone, mentation intact and speech normal  PSYCH: mentation appears normal, affect normal/bright    Diagnostic Test Results:  Labs reviewed in Epic  Labs in process    ASSESSMENT/PLAN:   (Z00.00) Routine general medical examination at a health care facility  (primary encounter diagnosis)  Comment:   Plan: Pap imaged thin layer screen with HPV -         recommended age 30 - 65, HPV High Risk Types         DNA Cervical, MA Screening Digital Bilateral,         levothyroxine (SYNTHROID/LEVOTHROID) 112 MCG         tablet            (F41.1) AMBROSE (generalized anxiety disorder)  Comment: Controlled  Plan: busPIRone (BUSPAR) 5 MG tablet        Refills given.  She is to continue her medication as prescribed.  Next clinic appointment in 1 year for recheck and refills, sooner problems.    (E03.8,  E06.3) Hypothyroidism due to Hashimoto's thyroiditis  Comment: Controlled  Plan: levothyroxine (SYNTHROID/LEVOTHROID) 112 MCG         tablet        Refills given.  She is to continue her medication as prescribed.  Next clinic appointment in 1 year for recheck and refills, sooner problems.    (R45.86) Mood swings  Comment: Controlled  Plan: sertraline (ZOLOFT) 50 MG tablet        Refills given.  She is to continue her medication as prescribed.  Next clinic appointment in 1 year for recheck and refills, sooner problems.    (Z97.5) IUD  "(intrauterine device) in place, Paragard  Comment:   Plan: OB/GYN REFERRAL        Since the patient has had this IUD in for almost 20 years and she had pain with the vaginal exam today, we did not remove the IUD.  I did go ahead and give her a prescription for Valium.  She is to take 1 or 2 tablets 1 hour before her IUD removal that will be done by OB/GYN.  I talked to her at length about the importance of having a , do not drive within 8 hours of taking the Valium.  She is appreciative and she will return to OB/GYN for the IUD removal.    (R53.83) Fatigue, unspecified type  Comment: Etiology uncertain  Plan: For today, lab studies were done to rule out underlying metabolic problems.  I did encourage her to eat a healthy diet, protein sources, drink fluids, rest etc.  In the event that labs today are abnormal, she will be treated.  If her labs are normal and her fatigue continues, she will follow up with a different provider due to my upcoming care home for additional work-up.    (M25.50) Multiple joint pain  Comment: Etiology uncertain  Plan: Gentle stretching, range of motion encouraged today.    (Z12.4) Screening for cervical cancer  Comment:   Plan: Pap imaged thin layer screen with HPV -         recommended age 30 - 65, HPV High Risk Types         DNA Cervical        Done today    (Z12.31) Encounter for screening mammogram for malignant neoplasm of breast  Comment:   Plan: MA Screening Digital Bilateral, diazepam         (VALIUM) 2 MG tablet        Routine screening mammogram at earliest convenience        Patient has been advised of split billing requirements and indicates understanding: Not by this provider  COUNSELING:  Reviewed preventive health counseling, as reflected in patient instructions    Estimated body mass index is 24.04 kg/m  as calculated from the following:    Height as of this encounter: 1.441 m (4' 8.75\").    Weight as of this encounter: 50 kg (110 lb 1.9 oz).        She reports that " she has never smoked. She has never used smokeless tobacco.      Counseling Resources:  ATP IV Guidelines  Pooled Cohorts Equation Calculator  Breast Cancer Risk Calculator  BRCA-Related Cancer Risk Assessment: FHS-7 Tool  FRAX Risk Assessment  ICSI Preventive Guidelines  Dietary Guidelines for Americans, 2010  USDA's MyPlate  ASA Prophylaxis  Lung CA Screening    ANAYELI Cruz CNP  [facility]

## 2021-06-28 LAB
COPATH REPORT: NORMAL
PAP: NORMAL

## 2021-07-20 ENCOUNTER — OFFICE VISIT (OUTPATIENT)
Dept: OBGYN | Facility: CLINIC | Age: 56
End: 2021-07-20
Payer: OTHER GOVERNMENT

## 2021-07-20 VITALS
DIASTOLIC BLOOD PRESSURE: 70 MMHG | HEIGHT: 57 IN | SYSTOLIC BLOOD PRESSURE: 140 MMHG | BODY MASS INDEX: 23.73 KG/M2 | WEIGHT: 110 LBS

## 2021-07-20 DIAGNOSIS — T83.31XA MECHANICAL BREAKDOWN OF INTRAUTERINE CONTRACEPTIVE DEVICE, INITIAL ENCOUNTER: Primary | ICD-10-CM

## 2021-07-20 DIAGNOSIS — Z30.432 ENCOUNTER FOR IUD REMOVAL: ICD-10-CM

## 2021-07-20 PROCEDURE — 58301 REMOVE INTRAUTERINE DEVICE: CPT | Mod: 52 | Performed by: OBSTETRICS & GYNECOLOGY

## 2021-07-20 PROCEDURE — 99203 OFFICE O/P NEW LOW 30 MIN: CPT | Mod: 25 | Performed by: OBSTETRICS & GYNECOLOGY

## 2021-07-20 ASSESSMENT — MIFFLIN-ST. JEOR: SCORE: 968.87

## 2021-07-20 NOTE — PROGRESS NOTES
"GYN CLINIC VISIT  7/20/2021     CC: IUD removal    HPI:   Lety Alcantara is a 56 year old postmenopausal female who presents to clinic today for an IUD removal. She had a paragard IUD inserted about 20 years ago. She is aware it is very overdue for removal. She did not tolerate speculum exam well at time of her recent pap so her PCP was unable to remove IUD then.     Last pap: 6/23/21 NIL neg HR HPV    Reviewed GYN hx, OB hx, past medical hx, surgical hx and family hx.   Reviewed medications and allergies. Changes made in EPIC.     OBJECTIVE:   Vitals:    07/20/21 0933   BP: (!) 140/70   Weight: 49.9 kg (110 lb)   Height: 1.441 m (4' 8.75\")     Body mass index is 24.01 kg/m .     Gen: alert, oriented, no distress, cooperative and pleasant  Abd: soft, nontender, nondistended  : normal external genitalia without lesions or abnormalities. Normal Bartholin's, normal [place]'s, normal urethra. Normal vaginal mucosa, no abnormal discharge or lesions. Cervix appears normal, IUD strings visible, no lesions or erythema. Bimanual exam reveals 6 week sized retroverted mobile uterus, no cervical motion tenderness, no uterine tenderness, no adnexal masses.    PROCEDURE: IUD REMOVAL  Patient has verbalized understanding of risks and benefits. All questions answered. She has signed the consent form.      A medium aldair speculum was placed in the vagina with good visualization of the cervix.  The IUD strings visualized at cervical os. IUD strings grasped with sterile ring forceps. Gentle traction applied and IUD strings broke off above the knot. IUD was not removed. Patient tolerated it well but did not want me to do anything further to attempt to remove it in clinic.       ASSESSMENT:   Lety Alcantara is a 56 year old postmenopausal who had unsuccessful attempt at IUD removal today because the strings broke off. Discussed trying to remove it in clinic with alligator or packing forceps but as patient had " significant discomfort with speculum exam, she prefers removal of IUD in OR with sedation. Discussed possible need for hysteroscopy. .    PLAN:  1. Mechanical breakdown of intrauterine contraceptive device, initial encounter  Plan removal in OR with sedation. May need to use hysteroscope. The risks, benefits, alternatives and indications for hysteroscopy were discussed with the patient in detail. Those risks specifically addressed were: infection which may require antibiotics; bleeding which could result in additional surgical procedures, or a blood transfusion; cervical laceration; and uterine perforation with injury to other organs like her bowel and bladder. The patient also understands that this procedure would require either MAC or general anesthesia and be associated with risks of anesthesia. She understands the procedure lasts approximately 30 minutes. The patient understands this is an outpatient procedure. She will present on the day of surgery and be discharged home later that day. All of the patient's questions were answered in the clinic today.    My  will reach out to her to schedule procedure. Plan pre-op visit with PCP.     - Case Request: REMOVAL, INTRAUTERINE DEVICE, HYSTEROSCOPIC removal of intrauterine device; Standing  - Case Request: REMOVAL, INTRAUTERINE DEVICE, HYSTEROSCOPIC removal of intrauterine device    2. Encounter for IUD removal  Unsuccessful because strings broke off.   - REMOVE INTRAUTERINE DEVICE      Che Pizano MD

## 2021-07-22 ENCOUNTER — TELEPHONE (OUTPATIENT)
Dept: OBGYN | Facility: CLINIC | Age: 56
End: 2021-07-22

## 2021-07-22 DIAGNOSIS — Z11.59 ENCOUNTER FOR SCREENING FOR OTHER VIRAL DISEASES: ICD-10-CM

## 2021-07-22 DIAGNOSIS — T83.31XA MECHANICAL BREAKDOWN OF INTRAUTERINE CONTRACEPTIVE DEVICE, INITIAL ENCOUNTER: ICD-10-CM

## 2021-07-22 DIAGNOSIS — Z01.818 PRE-OP EXAM: Primary | ICD-10-CM

## 2021-07-22 NOTE — TELEPHONE ENCOUNTER
Type of surgery: gyn  Location of surgery: [facility] [place] [facility]  Date and time of surgery: 08/02/21 1:30PM  Surgeon: Jerica  Pre-Op Appt Date: 07/26/21 Savanah Alejandre  Post-Op Appt Date: not needed   Packet sent out: Yes  Pre-cert/Authorization completed:  Not Applicable  Date: 07/22/21     Dalila

## 2021-07-26 ENCOUNTER — OFFICE VISIT (OUTPATIENT)
Dept: FAMILY MEDICINE | Facility: CLINIC | Age: 56
End: 2021-07-26
Payer: OTHER GOVERNMENT

## 2021-07-26 VITALS
DIASTOLIC BLOOD PRESSURE: 80 MMHG | TEMPERATURE: 98.1 F | SYSTOLIC BLOOD PRESSURE: 120 MMHG | OXYGEN SATURATION: 100 % | HEART RATE: 54 BPM | BODY MASS INDEX: 24.23 KG/M2 | WEIGHT: 111 LBS

## 2021-07-26 DIAGNOSIS — T83.31XD MECHANICAL BREAKDOWN OF INTRAUTERINE CONTRACEPTIVE DEVICE, SUBSEQUENT ENCOUNTER: ICD-10-CM

## 2021-07-26 DIAGNOSIS — Z01.818 PREOP GENERAL PHYSICAL EXAM: Primary | ICD-10-CM

## 2021-07-26 PROCEDURE — 99213 OFFICE O/P EST LOW 20 MIN: CPT | Performed by: NURSE PRACTITIONER

## 2021-07-26 NOTE — PROGRESS NOTES
[address]  [address]  [facility] [address]  Phone: [phone]  Fax: [phone]  Primary Provider: Massiel Berger  Pre-op Performing Provider: RAZA BOLIVAR      PREOPERATIVE EVALUATION:  Today's date: 7/26/2021    Lety Alcantara is a 56 year old female who presents for a preoperative evaluation.    Surgical Information:  Surgery/Procedure: REMOVAL, INTRAUTERINE DEVICE Taragard and Possible HYSTEROSCOPIC removal of intrauterine device  Surgery Location: [facility]  Surgeon: Che Pizano MD  Surgery Date: 08/02/2021  Time of Surgery: 1:30 PM  Where patient plans to recover: At home with family  Fax number for surgical facility: Note does not need to be faxed, will be available electronically in Epic.    Type of Anesthesia Anticipated: Monitor Anesthesia Care      Pre-op Questionnaire 7/26/2021   1. Have you ever had a heart attack or stroke? No   2. Have you ever had surgery on your heart or blood vessels, such as a stent placement, a coronary artery bypass, or surgery on an artery in your head, neck, heart, or legs? No   3. Do you have chest pain with activity? No   4. Do you have a history of  heart failure? No   5. Do you currently have a cold, bronchitis or symptoms of other infection? No   6. Do you have a cough, shortness of breath, or wheezing? No   7. Do you or anyone in your family have previous history of blood clots? No   8. Do you or does anyone in your family have a serious bleeding problem such as prolonged bleeding following surgeries or cuts? No   9. Have you ever had problems with anemia or been told to take iron pills? No   10. Have you had any abnormal blood loss such as black, tarry or bloody stools, or abnormal vaginal bleeding? No   11. Have you ever had a blood transfusion? No   12. Are you willing to have a blood transfusion if it is medically needed before, during, or after your surgery? Yes   13. Have you or any of your  relatives ever had problems with anesthesia? No   14. Do you have sleep apnea, excessive snoring or daytime drowsiness? No   15. Do you have any artifical heart valves or other implanted medical devices like a pacemaker, defibrillator, or continuous glucose monitor? No   16. Do you have artificial joints? No   17. Are you allergic to latex? No   18. Is there any chance that you may be pregnant? No         Assessment & Plan     ICD-10-CM    1. Preop general physical exam  Z01.818    2. Mechanical breakdown of intrauterine contraceptive device, subsequent encounter  T83.31XD       The proposed surgical procedure is considered LOW risk.      Risks and Recommendations:  The patient has the following additional risks and recommendations for perioperative complications:   - No identified additional risk factors other than previously addressed    Medication Instructions:  Patient is to take all scheduled medications on the day of surgery    RECOMMENDATION:  APPROVAL GIVEN to proceed with proposed procedure pending review of diagnostic evaluation.                  Subjective     HPI related to upcoming procedure: IUD string broke when ObGyn was attempting removal, was overdue to come out-had in for 20 years or so      Health Care Directive:  Patient does not have a Health Care Directive or Living Will: Discussed advance care planning with patient; information given to patient to review.    Preoperative Review of :   reviewed - controlled substances reflected in medication list.  Valium from Primary Care Provider     Status of Chronic Conditions:  See problem list for active medical problems.  Problems all longstanding and stable, except as noted/documented.  See ROS for pertinent symptoms related to these conditions.    Seizure disorder: Diagnosed years ago.  Last seizure 7  Years ago.  She had been on Keppra., Sleep deprived EEG in March 2018 and then she weaned off of the Keppra successfully no issues    Sober for  over 11 years now     thryoid stable on current dose     Mood swings and Anxiety well controlled on zoloft     Review of Systems  Constitutional, neuro, ENT, endocrine, pulmonary, cardiac, gastrointestinal, genitourinary, musculoskeletal, integument and psychiatric systems are negative, except as otherwise noted.    Patient Active Problem List    Diagnosis Date Noted     Mechanical breakdown of intrauterine contraceptive device, initial encounter 2021     Priority: Medium     Added automatically from request for surgery [id number]       Right-sided low back pain with right-sided sciatica, unspecified chronicity 2019     Priority: Medium     Seizures (H) 2018     Priority: Medium     Hypothyroidism due to Hashimoto's thyroiditis 2018     Priority: Medium     IUD (intrauterine device) in place, Paragard 2018     Priority: Medium     Mood swings 05/15/2015     Priority: Medium      Past Medical History:   Diagnosis Date     Arthritis 2017     Depressive disorder 2000     Epilepsy (H)      Hypothyroidism      Hypothyroidism due to Hashimoto's thyroiditis 2018     Past Surgical History:   Procedure Laterality Date      SECTION  01     Current Outpatient Medications   Medication Sig Dispense Refill     busPIRone (BUSPAR) 5 MG tablet Take 1 tablet (5 mg) by mouth 2 times daily 180 tablet 3     diazepam (VALIUM) 2 MG tablet Take 1-2 tablets 60 minutes before the IUD removal 1 tablet 0     levothyroxine (SYNTHROID/LEVOTHROID) 112 MCG tablet Take 1 tablet (112 mcg) by mouth daily 90 tablet 3     paragard intrauterine copper device 1 each by Intrauterine route once       sertraline (ZOLOFT) 50 MG tablet Take 1 tablet (50 mg) by mouth daily 90 tablet 3       Allergies   Allergen Reactions     Molds & Smuts Anaphylaxis     Seafood Anaphylaxis     Sulfa Drugs Rash        Social History     Tobacco Use     Smoking status: Never Smoker     Smokeless tobacco: Never Used    Substance Use Topics     Alcohol use: Not Currently     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     Family History   Problem Relation Age of Onset     Diabetes Brother      Coronary Artery Disease Father      Hypertension Father      Hyperlipidemia Father      Depression Father      Hypertension Mother      Hyperlipidemia Mother      Breast Cancer Mother         over 70 when had breast cancer     Other Cancer Mother      Depression Sister      Anxiety Disorder Sister      Depression Daughter      Anxiety Disorder Daughter      Asthma Daughter      Thyroid Disease Sister      History   Drug Use No         Objective     /80   Pulse 54   Temp 98.1  F (36.7  C) (Temporal)   Wt 50.3 kg (111 lb)   LMP 04/06/2018 (Approximate)   SpO2 100%   Breastfeeding No   BMI 24.23 kg/m      Physical Exam    GENERAL APPEARANCE: healthy, alert and no distress     EYES: EOMI, PERRL     HENT: ear canals and TM's normal and nose and mouth without ulcers or lesions     NECK: no adenopathy, no asymmetry, masses, or scars and thyroid normal to palpation     RESP: lungs clear to auscultation - no rales, rhonchi or wheezes     CV: regular rates and rhythm, normal S1 S2, no S3 or S4 and no murmur, click or rub     ABDOMEN:  soft, nontender, no HSM or masses and bowel sounds normal     MS: extremities normal- no gross deformities noted, no evidence of inflammation in joints, FROM in all extremities.     SKIN: no suspicious lesions or rashes, tattoos     NEURO: Normal strength and tone, sensory exam grossly normal, mentation intact and speech normal     PSYCH: mentation appears normal. and affect normal/bright     LYMPHATICS: No cervical adenopathy    Recent Labs   Lab Test 06/22/21  0720 09/09/20  0710   HGB  --  12.6     --    POTASSIUM 4.8  --    CR 0.76  --         Diagnostics:  Labs to be done per ObGyn sooner to date  No EKG required for low risk surgery (cataract, skin procedure, breast biopsy, etc).    Revised Cardiac  Risk Index (RCRI):  The patient has the following serious cardiovascular risks for perioperative complications:   - No serious cardiac risks = 0 points     RCRI Interpretation: 0 points: Class I (very low risk - 0.4% complication rate)           Signed Electronically by: ANAYELI Blanchard CNP  Copy of this evaluation report is provided to requesting physician.

## 2021-07-26 NOTE — H&P (VIEW-ONLY)
[address]  [address]  [facility] [address]  Phone: [phone]  Fax: [phone]  Primary Provider: Massiel Berger  Pre-op Performing Provider: RAZA BOLIVAR      PREOPERATIVE EVALUATION:  Today's date: 7/26/2021    Lety Alcantara is a 56 year old female who presents for a preoperative evaluation.    Surgical Information:  Surgery/Procedure: REMOVAL, INTRAUTERINE DEVICE Taragard and Possible HYSTEROSCOPIC removal of intrauterine device  Surgery Location: [facility]  Surgeon: Che Pizano MD  Surgery Date: 08/02/2021  Time of Surgery: 1:30 PM  Where patient plans to recover: At home with family  Fax number for surgical facility: Note does not need to be faxed, will be available electronically in Epic.    Type of Anesthesia Anticipated: Monitor Anesthesia Care      Pre-op Questionnaire 7/26/2021   1. Have you ever had a heart attack or stroke? No   2. Have you ever had surgery on your heart or blood vessels, such as a stent placement, a coronary artery bypass, or surgery on an artery in your head, neck, heart, or legs? No   3. Do you have chest pain with activity? No   4. Do you have a history of  heart failure? No   5. Do you currently have a cold, bronchitis or symptoms of other infection? No   6. Do you have a cough, shortness of breath, or wheezing? No   7. Do you or anyone in your family have previous history of blood clots? No   8. Do you or does anyone in your family have a serious bleeding problem such as prolonged bleeding following surgeries or cuts? No   9. Have you ever had problems with anemia or been told to take iron pills? No   10. Have you had any abnormal blood loss such as black, tarry or bloody stools, or abnormal vaginal bleeding? No   11. Have you ever had a blood transfusion? No   12. Are you willing to have a blood transfusion if it is medically needed before, during, or after your surgery? Yes   13. Have you or any of your  relatives ever had problems with anesthesia? No   14. Do you have sleep apnea, excessive snoring or daytime drowsiness? No   15. Do you have any artifical heart valves or other implanted medical devices like a pacemaker, defibrillator, or continuous glucose monitor? No   16. Do you have artificial joints? No   17. Are you allergic to latex? No   18. Is there any chance that you may be pregnant? No         Assessment & Plan     ICD-10-CM    1. Preop general physical exam  Z01.818    2. Mechanical breakdown of intrauterine contraceptive device, subsequent encounter  T83.31XD       The proposed surgical procedure is considered LOW risk.      Risks and Recommendations:  The patient has the following additional risks and recommendations for perioperative complications:   - No identified additional risk factors other than previously addressed    Medication Instructions:  Patient is to take all scheduled medications on the day of surgery    RECOMMENDATION:  APPROVAL GIVEN to proceed with proposed procedure pending review of diagnostic evaluation.                  Subjective     HPI related to upcoming procedure: IUD string broke when ObGyn was attempting removal, was overdue to come out-had in for 20 years or so      Health Care Directive:  Patient does not have a Health Care Directive or Living Will: Discussed advance care planning with patient; information given to patient to review.    Preoperative Review of :   reviewed - controlled substances reflected in medication list.  Valium from Primary Care Provider     Status of Chronic Conditions:  See problem list for active medical problems.  Problems all longstanding and stable, except as noted/documented.  See ROS for pertinent symptoms related to these conditions.    Seizure disorder: Diagnosed years ago.  Last seizure 7  Years ago.  She had been on Keppra., Sleep deprived EEG in March 2018 and then she weaned off of the Keppra successfully no issues    Sober for  over 11 years now     thryoid stable on current dose     Mood swings and Anxiety well controlled on zoloft     Review of Systems  Constitutional, neuro, ENT, endocrine, pulmonary, cardiac, gastrointestinal, genitourinary, musculoskeletal, integument and psychiatric systems are negative, except as otherwise noted.    Patient Active Problem List    Diagnosis Date Noted     Mechanical breakdown of intrauterine contraceptive device, initial encounter 2021     Priority: Medium     Added automatically from request for surgery [id number]       Right-sided low back pain with right-sided sciatica, unspecified chronicity 2019     Priority: Medium     Seizures (H) 2018     Priority: Medium     Hypothyroidism due to Hashimoto's thyroiditis 2018     Priority: Medium     IUD (intrauterine device) in place, Paragard 2018     Priority: Medium     Mood swings 05/15/2015     Priority: Medium      Past Medical History:   Diagnosis Date     Arthritis 2017     Depressive disorder 2000     Epilepsy (H)      Hypothyroidism      Hypothyroidism due to Hashimoto's thyroiditis 2018     Past Surgical History:   Procedure Laterality Date      SECTION  01     Current Outpatient Medications   Medication Sig Dispense Refill     busPIRone (BUSPAR) 5 MG tablet Take 1 tablet (5 mg) by mouth 2 times daily 180 tablet 3     diazepam (VALIUM) 2 MG tablet Take 1-2 tablets 60 minutes before the IUD removal 1 tablet 0     levothyroxine (SYNTHROID/LEVOTHROID) 112 MCG tablet Take 1 tablet (112 mcg) by mouth daily 90 tablet 3     paragard intrauterine copper device 1 each by Intrauterine route once       sertraline (ZOLOFT) 50 MG tablet Take 1 tablet (50 mg) by mouth daily 90 tablet 3       Allergies   Allergen Reactions     Molds & Smuts Anaphylaxis     Seafood Anaphylaxis     Sulfa Drugs Rash        Social History     Tobacco Use     Smoking status: Never Smoker     Smokeless tobacco: Never Used    Substance Use Topics     Alcohol use: Not Currently     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     Family History   Problem Relation Age of Onset     Diabetes Brother      Coronary Artery Disease Father      Hypertension Father      Hyperlipidemia Father      Depression Father      Hypertension Mother      Hyperlipidemia Mother      Breast Cancer Mother         over 70 when had breast cancer     Other Cancer Mother      Depression Sister      Anxiety Disorder Sister      Depression Daughter      Anxiety Disorder Daughter      Asthma Daughter      Thyroid Disease Sister      History   Drug Use No         Objective     /80   Pulse 54   Temp 98.1  F (36.7  C) (Temporal)   Wt 50.3 kg (111 lb)   LMP 04/06/2018 (Approximate)   SpO2 100%   Breastfeeding No   BMI 24.23 kg/m      Physical Exam    GENERAL APPEARANCE: healthy, alert and no distress     EYES: EOMI, PERRL     HENT: ear canals and TM's normal and nose and mouth without ulcers or lesions     NECK: no adenopathy, no asymmetry, masses, or scars and thyroid normal to palpation     RESP: lungs clear to auscultation - no rales, rhonchi or wheezes     CV: regular rates and rhythm, normal S1 S2, no S3 or S4 and no murmur, click or rub     ABDOMEN:  soft, nontender, no HSM or masses and bowel sounds normal     MS: extremities normal- no gross deformities noted, no evidence of inflammation in joints, FROM in all extremities.     SKIN: no suspicious lesions or rashes, tattoos     NEURO: Normal strength and tone, sensory exam grossly normal, mentation intact and speech normal     PSYCH: mentation appears normal. and affect normal/bright     LYMPHATICS: No cervical adenopathy    Recent Labs   Lab Test 06/22/21  0720 09/09/20  0710   HGB  --  12.6     --    POTASSIUM 4.8  --    CR 0.76  --         Diagnostics:  Labs to be done per ObGyn sooner to date  No EKG required for low risk surgery (cataract, skin procedure, breast biopsy, etc).    Revised Cardiac  Risk Index (RCRI):  The patient has the following serious cardiovascular risks for perioperative complications:   - No serious cardiac risks = 0 points     RCRI Interpretation: 0 points: Class I (very low risk - 0.4% complication rate)           Signed Electronically by: ANAYELI Blanchard CNP  Copy of this evaluation report is provided to requesting physician.

## 2021-07-26 NOTE — PATIENT INSTRUCTIONS

## 2021-07-30 ENCOUNTER — LAB (OUTPATIENT)
Dept: LAB | Facility: CLINIC | Age: 56
End: 2021-07-30
Payer: OTHER GOVERNMENT

## 2021-07-30 DIAGNOSIS — T83.31XA MECHANICAL BREAKDOWN OF INTRAUTERINE CONTRACEPTIVE DEVICE, INITIAL ENCOUNTER: ICD-10-CM

## 2021-07-30 DIAGNOSIS — Z11.59 ENCOUNTER FOR SCREENING FOR OTHER VIRAL DISEASES: ICD-10-CM

## 2021-07-30 DIAGNOSIS — Z01.818 PRE-OP EXAM: ICD-10-CM

## 2021-07-30 LAB
ABO/RH(D): NORMAL
ANTIBODY SCREEN: NEGATIVE
ERYTHROCYTE [DISTWIDTH] IN BLOOD BY AUTOMATED COUNT: 13.1 % (ref 10–15)
HCT VFR BLD AUTO: 38.4 % (ref 35–47)
HGB BLD-MCNC: 12.8 G/DL (ref 11.7–15.7)
MCH RBC QN AUTO: 29.6 PG (ref 26.5–33)
MCHC RBC AUTO-ENTMCNC: 33.3 G/DL (ref 31.5–36.5)
MCV RBC AUTO: 89 FL (ref 78–100)
PLATELET # BLD AUTO: 343 10E3/UL (ref 150–450)
RBC # BLD AUTO: 4.32 10E6/UL (ref 3.8–5.2)
SPECIMEN EXPIRATION DATE: NORMAL
WBC # BLD AUTO: 5.4 10E3/UL (ref 4–11)

## 2021-07-30 PROCEDURE — 86850 RBC ANTIBODY SCREEN: CPT

## 2021-07-30 PROCEDURE — 36415 COLL VENOUS BLD VENIPUNCTURE: CPT

## 2021-07-30 PROCEDURE — 86901 BLOOD TYPING SEROLOGIC RH(D): CPT

## 2021-07-30 PROCEDURE — 86900 BLOOD TYPING SEROLOGIC ABO: CPT

## 2021-07-30 PROCEDURE — U0003 INFECTIOUS AGENT DETECTION BY NUCLEIC ACID (DNA OR RNA); SEVERE ACUTE RESPIRATORY SYNDROME CORONAVIRUS 2 (SARS-COV-2) (CORONAVIRUS DISEASE [COVID-19]), AMPLIFIED PROBE TECHNIQUE, MAKING USE OF HIGH THROUGHPUT TECHNOLOGIES AS DESCRIBED BY CMS-2020-01-R: HCPCS

## 2021-07-30 PROCEDURE — U0005 INFEC AGEN DETEC AMPLI PROBE: HCPCS

## 2021-07-30 PROCEDURE — 85027 COMPLETE CBC AUTOMATED: CPT

## 2021-07-31 LAB — SARS-COV-2 RNA RESP QL NAA+PROBE: NEGATIVE

## 2021-08-02 ENCOUNTER — ANESTHESIA EVENT (OUTPATIENT)
Dept: SURGERY | Facility: CLINIC | Age: 56
End: 2021-08-02
Payer: OTHER GOVERNMENT

## 2021-08-02 ENCOUNTER — ANESTHESIA (OUTPATIENT)
Dept: SURGERY | Facility: CLINIC | Age: 56
End: 2021-08-02
Payer: OTHER GOVERNMENT

## 2021-08-02 ENCOUNTER — HOSPITAL ENCOUNTER (OUTPATIENT)
Facility: CLINIC | Age: 56
Discharge: HOME | End: 2021-08-02
Attending: OBSTETRICS & GYNECOLOGY | Admitting: OBSTETRICS & GYNECOLOGY
Payer: OTHER GOVERNMENT

## 2021-08-02 VITALS
HEIGHT: 57 IN | BODY MASS INDEX: 23.4 KG/M2 | TEMPERATURE: 97.5 F | DIASTOLIC BLOOD PRESSURE: 79 MMHG | WEIGHT: 108.47 LBS | OXYGEN SATURATION: 98 % | RESPIRATION RATE: 18 BRPM | HEART RATE: 49 BPM | SYSTOLIC BLOOD PRESSURE: 130 MMHG

## 2021-08-02 DIAGNOSIS — T83.31XA MECHANICAL BREAKDOWN OF INTRAUTERINE CONTRACEPTIVE DEVICE, INITIAL ENCOUNTER: ICD-10-CM

## 2021-08-02 LAB — GLUCOSE BLDC GLUCOMTR-MCNC: 85 MG/DL (ref 70–99)

## 2021-08-02 PROCEDURE — 250N000011 HC RX IP 250 OP 636: Performed by: REGISTERED NURSE

## 2021-08-02 PROCEDURE — 258N000003 HC RX IP 258 OP 636: Performed by: ANESTHESIOLOGY

## 2021-08-02 PROCEDURE — 272N000001 HC OR GENERAL SUPPLY STERILE: Performed by: OBSTETRICS & GYNECOLOGY

## 2021-08-02 PROCEDURE — 250N000009 HC RX 250: Performed by: OBSTETRICS & GYNECOLOGY

## 2021-08-02 PROCEDURE — 999N000141 HC STATISTIC PRE-PROCEDURE NURSING ASSESSMENT: Performed by: OBSTETRICS & GYNECOLOGY

## 2021-08-02 PROCEDURE — 360N000076 HC SURGERY LEVEL 3, PER MIN: Performed by: OBSTETRICS & GYNECOLOGY

## 2021-08-02 PROCEDURE — 370N000017 HC ANESTHESIA TECHNICAL FEE, PER MIN: Performed by: OBSTETRICS & GYNECOLOGY

## 2021-08-02 PROCEDURE — 250N000009 HC RX 250: Performed by: REGISTERED NURSE

## 2021-08-02 PROCEDURE — 710N000012 HC RECOVERY PHASE 2, PER MINUTE: Performed by: OBSTETRICS & GYNECOLOGY

## 2021-08-02 PROCEDURE — 58579 UNLISTED HYSTSC PX UTERUS: CPT | Performed by: OBSTETRICS & GYNECOLOGY

## 2021-08-02 PROCEDURE — 250N000013 HC RX MED GY IP 250 OP 250 PS 637: Performed by: OBSTETRICS & GYNECOLOGY

## 2021-08-02 RX ORDER — OXYCODONE HYDROCHLORIDE 5 MG/1
5 TABLET ORAL
Status: CANCELLED | OUTPATIENT
Start: 2021-08-02

## 2021-08-02 RX ORDER — ACETAMINOPHEN 325 MG/1
975 TABLET ORAL ONCE
Status: CANCELLED | OUTPATIENT
Start: 2021-08-02 | End: 2021-08-02

## 2021-08-02 RX ORDER — SODIUM CHLORIDE, SODIUM LACTATE, POTASSIUM CHLORIDE, CALCIUM CHLORIDE 600; 310; 30; 20 MG/100ML; MG/100ML; MG/100ML; MG/100ML
INJECTION, SOLUTION INTRAVENOUS CONTINUOUS
Status: DISCONTINUED | OUTPATIENT
Start: 2021-08-02 | End: 2021-08-02 | Stop reason: HOSPADM

## 2021-08-02 RX ORDER — LIDOCAINE HYDROCHLORIDE 20 MG/ML
INJECTION, SOLUTION INFILTRATION; PERINEURAL PRN
Status: DISCONTINUED | OUTPATIENT
Start: 2021-08-02 | End: 2021-08-02

## 2021-08-02 RX ORDER — NALOXONE HYDROCHLORIDE 0.4 MG/ML
0.2 INJECTION, SOLUTION INTRAMUSCULAR; INTRAVENOUS; SUBCUTANEOUS
Status: DISCONTINUED | OUTPATIENT
Start: 2021-08-02 | End: 2021-08-02 | Stop reason: HOSPADM

## 2021-08-02 RX ORDER — ONDANSETRON 4 MG/1
4 TABLET, ORALLY DISINTEGRATING ORAL EVERY 30 MIN PRN
Status: DISCONTINUED | OUTPATIENT
Start: 2021-08-02 | End: 2021-08-02 | Stop reason: HOSPADM

## 2021-08-02 RX ORDER — FENTANYL CITRATE 50 UG/ML
50 INJECTION, SOLUTION INTRAMUSCULAR; INTRAVENOUS
Status: DISCONTINUED | OUTPATIENT
Start: 2021-08-02 | End: 2021-08-02 | Stop reason: HOSPADM

## 2021-08-02 RX ORDER — ONDANSETRON 2 MG/ML
INJECTION INTRAMUSCULAR; INTRAVENOUS PRN
Status: DISCONTINUED | OUTPATIENT
Start: 2021-08-02 | End: 2021-08-02

## 2021-08-02 RX ORDER — ACETAMINOPHEN 325 MG/1
975 TABLET ORAL ONCE
Status: COMPLETED | OUTPATIENT
Start: 2021-08-02 | End: 2021-08-02

## 2021-08-02 RX ORDER — NALOXONE HYDROCHLORIDE 0.4 MG/ML
0.4 INJECTION, SOLUTION INTRAMUSCULAR; INTRAVENOUS; SUBCUTANEOUS
Status: DISCONTINUED | OUTPATIENT
Start: 2021-08-02 | End: 2021-08-02 | Stop reason: HOSPADM

## 2021-08-02 RX ORDER — KETOROLAC TROMETHAMINE 30 MG/ML
INJECTION, SOLUTION INTRAMUSCULAR; INTRAVENOUS PRN
Status: DISCONTINUED | OUTPATIENT
Start: 2021-08-02 | End: 2021-08-02

## 2021-08-02 RX ORDER — PROPOFOL 10 MG/ML
INJECTION, EMULSION INTRAVENOUS PRN
Status: DISCONTINUED | OUTPATIENT
Start: 2021-08-02 | End: 2021-08-02

## 2021-08-02 RX ORDER — PROPOFOL 10 MG/ML
INJECTION, EMULSION INTRAVENOUS CONTINUOUS PRN
Status: DISCONTINUED | OUTPATIENT
Start: 2021-08-02 | End: 2021-08-02

## 2021-08-02 RX ORDER — FENTANYL CITRATE 50 UG/ML
INJECTION, SOLUTION INTRAMUSCULAR; INTRAVENOUS PRN
Status: DISCONTINUED | OUTPATIENT
Start: 2021-08-02 | End: 2021-08-02

## 2021-08-02 RX ORDER — IBUPROFEN 800 MG/1
800 TABLET, FILM COATED ORAL ONCE
Status: CANCELLED | OUTPATIENT
Start: 2021-08-02 | End: 2021-08-02

## 2021-08-02 RX ORDER — OXYCODONE HYDROCHLORIDE 5 MG/1
5 TABLET ORAL EVERY 4 HOURS PRN
Status: DISCONTINUED | OUTPATIENT
Start: 2021-08-02 | End: 2021-08-02 | Stop reason: HOSPADM

## 2021-08-02 RX ORDER — LIDOCAINE HYDROCHLORIDE 10 MG/ML
INJECTION, SOLUTION EPIDURAL; INFILTRATION; INTRACAUDAL; PERINEURAL PRN
Status: DISCONTINUED | OUTPATIENT
Start: 2021-08-02 | End: 2021-08-02 | Stop reason: HOSPADM

## 2021-08-02 RX ORDER — ONDANSETRON 2 MG/ML
4 INJECTION INTRAMUSCULAR; INTRAVENOUS EVERY 30 MIN PRN
Status: DISCONTINUED | OUTPATIENT
Start: 2021-08-02 | End: 2021-08-02 | Stop reason: HOSPADM

## 2021-08-02 RX ADMIN — LIDOCAINE HYDROCHLORIDE 40 MG: 20 INJECTION, SOLUTION INFILTRATION; PERINEURAL at 12:46

## 2021-08-02 RX ADMIN — KETOROLAC TROMETHAMINE 15 MG: 30 INJECTION, SOLUTION INTRAMUSCULAR at 13:21

## 2021-08-02 RX ADMIN — FENTANYL CITRATE 25 MCG: 50 INJECTION, SOLUTION INTRAMUSCULAR; INTRAVENOUS at 12:54

## 2021-08-02 RX ADMIN — PROPOFOL 40 MG: 10 INJECTION, EMULSION INTRAVENOUS at 12:53

## 2021-08-02 RX ADMIN — PROPOFOL 30 MG: 10 INJECTION, EMULSION INTRAVENOUS at 12:46

## 2021-08-02 RX ADMIN — ONDANSETRON 4 MG: 2 INJECTION INTRAMUSCULAR; INTRAVENOUS at 12:46

## 2021-08-02 RX ADMIN — FENTANYL CITRATE 50 MCG: 50 INJECTION, SOLUTION INTRAMUSCULAR; INTRAVENOUS at 13:21

## 2021-08-02 RX ADMIN — SODIUM CHLORIDE, POTASSIUM CHLORIDE, SODIUM LACTATE AND CALCIUM CHLORIDE: 600; 310; 30; 20 INJECTION, SOLUTION INTRAVENOUS at 12:46

## 2021-08-02 RX ADMIN — FENTANYL CITRATE 25 MCG: 50 INJECTION, SOLUTION INTRAMUSCULAR; INTRAVENOUS at 12:58

## 2021-08-02 RX ADMIN — PROPOFOL 30 MG: 10 INJECTION, EMULSION INTRAVENOUS at 12:58

## 2021-08-02 RX ADMIN — FENTANYL CITRATE 50 MCG: 50 INJECTION, SOLUTION INTRAMUSCULAR; INTRAVENOUS at 13:00

## 2021-08-02 RX ADMIN — PROPOFOL 20 MG: 10 INJECTION, EMULSION INTRAVENOUS at 12:49

## 2021-08-02 RX ADMIN — ACETAMINOPHEN 975 MG: 325 TABLET, FILM COATED ORAL at 12:22

## 2021-08-02 RX ADMIN — MIDAZOLAM 2 MG: 1 INJECTION INTRAMUSCULAR; INTRAVENOUS at 12:39

## 2021-08-02 RX ADMIN — PROPOFOL 125 MCG/KG/MIN: 10 INJECTION, EMULSION INTRAVENOUS at 12:46

## 2021-08-02 ASSESSMENT — ENCOUNTER SYMPTOMS
APNEA: 0
SEIZURES: 1

## 2021-08-02 ASSESSMENT — MIFFLIN-ST. JEOR: SCORE: 965.88

## 2021-08-02 NOTE — ANESTHESIA POSTPROCEDURE EVALUATION
Patient: Lety Alcantara    Procedure(s):  HYSTEROSCOPIC removal of intrauterine device    Diagnosis:Mechanical breakdown of intrauterine contraceptive device, initial encounter [T83.31XA]  Diagnosis Additional Information: No value filed.    Anesthesia Type:  MAC    Note:  Disposition: Outpatient   Postop Pain Control: Uneventful            Sign Out: Well controlled pain   PONV: No   Neuro/Psych: Uneventful            Sign Out: Acceptable/Baseline neuro status   Airway/Respiratory: Uneventful            Sign Out: Acceptable/Baseline resp. status   CV/Hemodynamics: Uneventful            Sign Out: Acceptable CV status; No obvious hypovolemia; No obvious fluid overload   Other NRE: NONE   DID A NON-ROUTINE EVENT OCCUR? No    Event details/Postop Comments:  Awakening satisfactorily; strong; breathing well; oriented; communicating effectively; no complaints or complications; comfortable; says, her sister will be taking her home.            Last vitals:  Vitals Value Taken Time   /82 08/02/21 1345   Temp 36.4  C (97.5  F) 08/02/21 1336   Pulse 61 08/02/21 1345   Resp 18 08/02/21 1345   SpO2 99 % 08/02/21 1359   Vitals shown include unvalidated device data.    Electronically Signed By: Tk Hall MD  August 2, 2021  2:01 PM

## 2021-08-02 NOTE — OR NURSING
On 8/2/2021 at 1321 the patient's IUD was removed intact by Dr. Pizano during her hysteroscopic IUD removal

## 2021-08-02 NOTE — BRIEF OP NOTE
[facility]    Brief Operative Note  8/2/2021     Pre-operative diagnosis: Mechanical breakdown of intrauterine contraceptive device, initial encounter [T83.31XA]  Post-operative diagnosis same as above, status post removal    Procedure: Procedure(s):  REMOVAL, INTRAUTERINE DEVICE Taragard  Possible HYSTEROSCOPIC removal of intrauterine device  Surgeon: Surgeon(s) and Role:     * Che Pizano MD - Primary  Anesthesia: Monitor Anesthesia Care   Estimated blood loss: 20mL  IV fluids: 125mL crystalloid  Hysteroscopic fluid deficit: 90mL normal saline  Drains: None  Specimens: * No specimens in log *  Findings: bimanual exam shows 6wk size mobile uterus, no adnexal masses. Normal vaginal mucosa. Small cerix slightly deviated to the right. IUD visualized in cavity, removed intact, missing the copper coil on body of IUD. Scant remnants of copper wire in uterus, removed.   Complications: None apparent.  Implants: * No implants in log *    See full op note for details    Che Pizano MD

## 2021-08-02 NOTE — ANESTHESIA PREPROCEDURE EVALUATION
"Anesthesia Pre-Procedure Evaluation    Patient: Lety Alcantara   MRN:     [id number] Gender:   female   Age:    56 year old :      1965        Preoperative Diagnosis: Mechanical breakdown of intrauterine contraceptive device, initial encounter [T83.31XA]   Procedure(s):  REMOVAL, INTRAUTERINE DEVICE Taragard  Possible HYSTEROSCOPIC removal of intrauterine device     LABS:  CBC:   Lab Results   Component Value Date    WBC 5.4 2021    HGB 12.8 2021    HGB 12.6 2020    HCT 38.4 2021     2021     BMP:   Lab Results   Component Value Date     2021     2019    POTASSIUM 4.8 2021    POTASSIUM 4.1 2019    CHLORIDE 104 2021    CHLORIDE 104 2019    CO2 26 2021    CO2 24 2019    BUN 15 2021    BUN 13 2019    CR 0.76 2021    CR 0.67 2019    GLC 94 2021    GLC 80 2020     COAGS: No results found for: PTT, INR, FIBR  POC: No results found for: BGM, HCG, HCGS  OTHER:   Lab Results   Component Value Date    ROSEANN 9.5 2021    ALBUMIN 4.0 2021    PROTTOTAL 7.9 2021    ALT 25 2021    AST 25 2021    ALKPHOS 74 2021    BILITOTAL 0.3 2021    TSH 3.48 2021    T4 1.51 (H) 2018        Preop Vitals    BP Readings from Last 3 Encounters:   21 120/80   21 (!) 140/70   21 106/60    Pulse Readings from Last 3 Encounters:   21 54   21 56   21 53      Resp Readings from Last 3 Encounters:   21 16   21 16   20 16    SpO2 Readings from Last 3 Encounters:   21 100%   21 99%   21 96%      Temp Readings from Last 1 Encounters:   21 36.7  C (98.1  F) (Temporal)    Ht Readings from Last 1 Encounters:   21 1.441 m (4' 8.75\")      Wt Readings from Last 1 Encounters:   21 50.3 kg (111 lb)    Estimated body mass index is 24.23 kg/m  as calculated from the following:    Height " "as of 21: 1.441 m (4' 8.75\").    Weight as of 21: 50.3 kg (111 lb).     LDA:        Past Medical History:   Diagnosis Date     Arthritis 2017     Depressive disorder      Epilepsy (H)      Hypothyroidism      Hypothyroidism due to Hashimoto's thyroiditis 2018      Past Surgical History:   Procedure Laterality Date      SECTION  01      Allergies   Allergen Reactions     Molds & Smuts Anaphylaxis     Seafood Anaphylaxis     Sulfa Drugs Rash        Anesthesia Evaluation    ROS/Med Hx    No history of anesthetic complications  (-) malignant hyperthermia and tuberculosis  Comments: Lety is scheduled for removal of IUD     Her problem list is as follows:      Mechanical breakdown of intrauterine contraceptive device, initial encounter  Mood swings  Seizures (H)  Hypothyroidism due to Hashimoto's thyroiditis  IUD (intrauterine device) in place, Paragard  Right-sided low back pain with right-sided sciatica, unspecified chronicity      Met with Lety. She is NPO. She has had spinal anesthesia. FH negative for GA problems.         Cardiovascular Findings   Comments: Stable.     Neuro Findings   (+) seizures    Seizures    Controlled: well controlled  Last episode: > 1 year ago  Frequency: rare  Comments: History of depression and seizures.     Pulmonary Findings   (-) asthma and apnea    HENT Findings - negative HENT ROS    Skin Findings - negative skin ROS      GI/Hepatic/Renal Findings   (-) GERD    Endocrine/Metabolic Findings - negative ROS      Genetic/Syndrome Findings - negative genetics/syndromes ROS    Hematology/Oncology Findings - negative hematology/oncology ROS    Additional Notes  Allergies:   -- Molds & Smuts -- Anaphylaxis   -- Seafood -- Anaphylaxis   -- Sulfa Drugs -- Rash    Medications Prior to Admission:  busPIRone (BUSPAR) 5 MG tablet, Take 1 tablet (5 mg) by mouth 2 times daily, Disp: 180 tablet, Rfl: 3  levothyroxine (SYNTHROID/LEVOTHROID) 112 MCG tablet, " Take 1 tablet (112 mcg) by mouth daily, Disp: 90 tablet, Rfl: 3  paragard intrauterine copper device, 1 each by Intrauterine route once, Disp: , Rfl:   sertraline (ZOLOFT) 50 MG tablet, Take 1 tablet (50 mg) by mouth daily, Disp: 90 tablet, Rfl: 3            PHYSICAL EXAM:   Mental Status/Neuro: A/A/O   Airway: Facies: Feasible  Mallampati: II  Mouth/Opening: Full  TM distance: > 6 cm  Neck ROM: Full   Respiratory: Auscultation: CTAB     Resp. Rate: Normal     Resp. Effort: Normal      CV: Rhythm: Regular  Rate: Age appropriate  Heart: Normal Sounds  Edema: None   Comments: Dental: no acute issues                     Anesthesia Plan    ASA Status:  2   NPO Status:  NPO Appropriate    Anesthesia Type: MAC.   Induction: Intravenous, Propofol.   Maintenance: TIVA.        Consents    Anesthesia Plan(s) and associated risks, benefits, and realistic alternatives discussed. Questions answered and patient/representative(s) expressed understanding.     - Discussed with:  Patient      - Extended Intubation/Ventilatory Support Discussed: No.      - Patient is DNR/DNI Status: No    Use of blood products discussed: No .     Postoperative Care    Pain management: IV analgesics.   PONV prophylaxis: Ondansetron (or other 5HT-3), Dexamethasone or Solumedrol     Comments:    She requests anesthesia care. Procedures and risks explained. She understood and consented. Qs answered.          Tk Hall MD

## 2021-08-02 NOTE — ANESTHESIA CARE TRANSFER NOTE
Patient: Lety Alcantara    Procedure(s):  HYSTEROSCOPIC removal of intrauterine device    Diagnosis: Mechanical breakdown of intrauterine contraceptive device, initial encounter [T83.31XA]  Diagnosis Additional Information: No value filed.    Anesthesia Type:   MAC     Note:    Oropharynx: oropharynx clear of all foreign objects and spontaneously breathing  Level of Consciousness: awake  Oxygen Supplementation: room air    Independent Airway: airway patency satisfactory and stable  Dentition: dentition unchanged  Vital Signs Stable: post-procedure vital signs reviewed and stable  Report to RN Given: handoff report given  Patient transferred to: PACU    Handoff Report: Identifed the Patient, Identified the Reponsible Provider, Reviewed the pertinent medical history, Discussed the surgical course, Reviewed Intra-OP anesthesia mangement and issues during anesthesia, Set expectations for post-procedure period and Allowed opportunity for questions and acknowledgement of understanding      Vitals:  Vitals Value Taken Time   /69 08/02/21 1336   Temp     Pulse 55 08/02/21 1336   Resp     SpO2 98 % 08/02/21 1339   Vitals shown include unvalidated device data.    Electronically Signed By: ANAYELI Terry CRNA  August 2, 2021  1:40 PM

## 2021-08-04 NOTE — OP NOTE
OPERATIVE REPORT    Patient: Lety Alcantara   : 1965  MRN: [id number]    Date of Service: 8/3/2021     Pre-operative diagnosis: Mechanical breakdown of intrauterine device  Post-operative diagnosis: same as above status post removal of IUD  Procedure: hysteroscopic removal of intrauterine device  Surgeon: Che Pizano MD   Assistants: none  Anesthesia: monitored anesthesia care withIV sedation and local anesthetic  Anesthesiologist: Dr. Hall  EBL: 20mL  Urine: note measured   IV Fluids: 125mL crystalloid  Hysteroscopic fluid deficit: 90mL normal saline  Specimens: none  Complications: none apparent    Findings: Exam under anesthesia revealed normal external genitalia, normal vaginal mucosa and normal cervix without lesions. Bimanual exam demonstrated 6 week size anteverted mobile uterus, no adnexal masses. Hysteroscopy revealed IUD in cavity, removed intact, missed the copper coil on body of IUD. Scant remnants of copper wire in uterus, removed.    Indications: Lety Alcantara is a 56 year old postmenopausal female who had Paragard in place for 20 years. When attempt was made to remove IUD in clinic, strings broke off. Due to discomfort, patient requested IUD removal in OR. Risks, benefits, and alternatives to the procedure were discussed. The patient's questions were answered, understanding confirmed, and the patient signed written informed consent.    Technique: The patient was taken to the operating room where SCDs placed for VTE prophylaxis. No antibiotics given. She was placed under MAC anesthesia without difficulty. After patient was comfortable, she was placed in the dorsal lithotomy position with feet in Yovany stirrups. An exam under anesthesia was performed with findings as noted above. The patient was prepped and draped in the usual sterile fashion. Bladder was drained with straight catheter. Time was out performed to confirm patient's identity and planned procedure.     A speculum  was placed in the vagina and the cervix visualized. A single-toothed tenaculum was placed on the anterior lip of the cervix at 12 o'clock after infiltrating with 1mL 1% lidocaine plain. A paracervical block was placed at 4 and 8 o'clock with the same local anesthetic, using a total of 10mL of 1% lidocaine. Unable to pass any instruments through cervix. The cervical os was carefully dilated to 6mm with sequential hegar dilators without difficulty or encountering resistance. The hysteroscope was placed through the cervix into the uterine cavity with outflow turned on as to achieve hydrodilation of cervix while entering into uterine cavity. Examination of the uterine cavity demonstrated IUD present in uterus. IUD was grapsed with hysteroscopic grasper and removed intact. It was noted to be missing the copper coil on the body of the device. Hysteroscope reinserted and remnants of copper wire noted in uterus, these were removed with hysteroscopic grasper. Hysteroscope removed.      A fluid deficit of 90mL normal saline noted. The tenaculum was removed from the cervix and sites noted to be hemostatic. The speculum was removed from the vagina.     Instrument, needle and sponge counts were correct x2. I was present and scrubbed for the entire procedure. The patient tolerated the procedure well. The patient was awoken in the OR and transferred to the PACU in stable condition. The patient received IV toradadol for pain control.    Che Pizano MD

## 2021-08-06 NOTE — TELEPHONE ENCOUNTER
DIAGNOSIS: B knee pain and stiffness/ self/ no images/ [facility]   APPOINTMENT DATE: 8/11/2021   NOTES STATUS DETAILS   OFFICE NOTE from referring provider N/A    OFFICE NOTE from other specialist Care Everywhere 8/27/2012 OV from Yuri Foster MD  - [facility]   DISCHARGE SUMMARY from hospital N/A    DISCHARGE REPORT from the ER N/A    OPERATIVE REPORT N/A    EMG report N/A    MEDICATION LIST Internal    MRI N/A    DEXA (osteoporosis/bone health) N/A    CT SCAN N/A    XRAYS (IMAGES & REPORTS) PACS [facility]  8/27/12 XR Knee       Records Requested  08/06/21    Facility  [facility]  Fx. [phone]   Outcome Sent a fax for images

## 2021-08-10 DIAGNOSIS — M25.562 BILATERAL KNEE PAIN: Primary | ICD-10-CM

## 2021-08-10 DIAGNOSIS — M25.561 BILATERAL KNEE PAIN: Primary | ICD-10-CM

## 2021-08-11 ENCOUNTER — ANCILLARY PROCEDURE (OUTPATIENT)
Dept: GENERAL RADIOLOGY | Facility: CLINIC | Age: 56
End: 2021-08-11
Attending: FAMILY MEDICINE
Payer: OTHER GOVERNMENT

## 2021-08-11 ENCOUNTER — OFFICE VISIT (OUTPATIENT)
Dept: ORTHOPEDICS | Facility: CLINIC | Age: 56
End: 2021-08-11
Payer: OTHER GOVERNMENT

## 2021-08-11 ENCOUNTER — PRE VISIT (OUTPATIENT)
Dept: ORTHOPEDICS | Facility: CLINIC | Age: 56
End: 2021-08-11

## 2021-08-11 VITALS — BODY MASS INDEX: 23.3 KG/M2 | WEIGHT: 108 LBS | HEIGHT: 57 IN

## 2021-08-11 DIAGNOSIS — M25.562 BILATERAL KNEE PAIN: ICD-10-CM

## 2021-08-11 DIAGNOSIS — G89.29 CHRONIC PAIN OF BOTH KNEES: Primary | ICD-10-CM

## 2021-08-11 DIAGNOSIS — M25.561 BILATERAL KNEE PAIN: ICD-10-CM

## 2021-08-11 DIAGNOSIS — M25.561 CHRONIC PAIN OF BOTH KNEES: Primary | ICD-10-CM

## 2021-08-11 DIAGNOSIS — M25.562 CHRONIC PAIN OF BOTH KNEES: Primary | ICD-10-CM

## 2021-08-11 PROCEDURE — 99204 OFFICE O/P NEW MOD 45 MIN: CPT | Performed by: FAMILY MEDICINE

## 2021-08-11 PROCEDURE — 73562 X-RAY EXAM OF KNEE 3: CPT | Mod: LT | Performed by: RADIOLOGY

## 2021-08-11 ASSESSMENT — MIFFLIN-ST. JEOR: SCORE: 963.76

## 2021-08-11 NOTE — PROGRESS NOTES
Subjective:   Lety Alcantara is a 56 year old female who presents in clinic for evaluation of bilateral knee pain. She reports that she has been running 5 miles 2 x per week since Sprint . She will have intermittent episodes of pain in both knees and location of pain will change. She reports that her knees will swell after activity.   Spring 2021- backs felt sore, sitting on heels and squat  Hurt to run  Came and went, L or R, had two flare-ups and hard to walk  Right knee was more problematic  Stretching and rolling more, has a   No injury, - tripped during a work out went to [facility] and normal x-ray  Daughter had concerns about Lyme's disease and patient is requesting testing  Background:   Date of injury: None   Duration of symptoms: 8 days  Mechanism of Injury: Chronic; Activity Related Overuse/Running  Intensity: 3/10 at rest, 7/10 with activity   Aggravating factors: running or high impact activities.   Relieving Factors: rest, ice and NSAIDs (at night to help her sleep).  Prior Evaluation: Prior Physician Evalutation: [facility]     PAST MEDICAL, SOCIAL, SURGICAL AND FAMILY HISTORY: She  has a past medical history of Arthritis (), Depressive disorder (), Epilepsy (H) (), Hypothyroidism (), and Hypothyroidism due to Hashimoto's thyroiditis (2018). She also has no past medical history of Cancer (H), Cerebral infarction (H), Congestive heart failure (H), COPD (chronic obstructive pulmonary disease) (H), Diabetes (H), Heart disease, History of blood transfusion, Hypertension, or Uncomplicated asthma.  She  has a past surgical history that includes  section (01) and Operative hysteroscopy (N/A, 2021).  Her family history includes Anxiety Disorder in her daughter and sister; Asthma in her daughter; Breast Cancer in her mother; Coronary Artery Disease in her father; Depression in her daughter, father, and sister; Diabetes in her brother; Hyperlipidemia  "in her father and mother; Hypertension in her father and mother; Other Cancer in her mother; Thyroid Disease in her sister.  She reports that she has never smoked. She has never used smokeless tobacco. She reports previous alcohol use. She reports that she does not use drugs.    ALLERGIES: She is allergic to molds & smuts, seafood, and sulfa drugs.    CURRENT MEDICATIONS: She has a current medication list which includes the following prescription(s): buspirone, levothyroxine, and sertraline.     REVIEW OF SYSTEMS: 10 point review of systems is negative except as noted above.     Exam:   Ht 1.448 m (4' 9\")   Wt 49 kg (108 lb)   LMP 04/06/2018 (Approximate)   BMI 23.37 kg/m             CONSTITUTIONAL: healthy, alert, mild distress and cooperative  HEAD: Normocephalic. No masses, lesions, tenderness or abnormalities  SKIN: no suspicious lesions or rashes  GAIT: antalgic  NEUROLOGIC: Non-focal, Normal muscle tone and strength, reflexes normal, sensation grossly normal.  PSYCHIATRIC: affect normal/bright and mentation appears normal.    MUSCULOSKELETAL: R>L knee pain      Inspection:       AP/lateral alignment normal  Tender: posterior, medial joint line on right      Non-tender: patellar facets, MCL, LCL, lateral joint line,  IT band  Active Range of Motion: all normal  Strength: quad  5-/5, Hamstrings  5-/5, Gastroc  5/5, Tibialis anterior  5/5, Peroneals  5/5 and core strength  5/5 hip abductors and other core muscles   Special tests: normal Valgus stress test, normal Varus, negative Lachman's test, negative Edna's, no apprehension with lateral stress of the patella.       Assessment/Plan:   Is a 56-year-old female with past medical history of hypothyroidism presenting with right greater than left knee pain  1.  Right greater than left knee mild arthritis- PF worse than other areas  Right knee is currently more problematic and has posterior swelling similar to a Baker's cyst  Suspected degenerative changes to " her medial meniscus  Patient is agreeable to physical therapy  She declines cortisone injection at this time  She can use ice, NSAIDs, Tylenol as needed  Lyme's disease - testing blood work    RTC 6 weeks  X-RAY INTERPRETATION:   X-Ray of the Right Knee: 3-view, Vidal, lateral, sunrise   ordered and interpreted in the office today was positive for IMPRESSION:   1. Narrowing medial aspect patellofemoral joint left worse than right.  2. Mild narrowing lateral femoral tibial joint space on the right.  3. Small knee effusion on the right.

## 2021-08-11 NOTE — LETTER
2021      RE: Lety Alcantara  [address]  [place] [address]        Subjective:   Lety Alcantara is a 56 year old female who presents in clinic for evaluation of bilateral knee pain. She reports that she has been running 5 miles 2 x per week since Sprint . She will have intermittent episodes of pain in both knees and location of pain will change. She reports that her knees will swell after activity.   Spring 2021- backs felt sore, sitting on heels and squat  Hurt to run  Came and went, L or R, had two flare-ups and hard to walk  Right knee was more problematic  Stretching and rolling more, has a   No injury, - tripped during a work out went to [facility] and normal x-ray  Daughter had concerns about Lyme's disease and patient is requesting testing  Background:   Date of injury: None   Duration of symptoms: 8 days  Mechanism of Injury: Chronic; Activity Related Overuse/Running  Intensity: 3/10 at rest, 7/10 with activity   Aggravating factors: running or high impact activities.   Relieving Factors: rest, ice and NSAIDs (at night to help her sleep).  [facility]: [facility]     PAST MEDICAL, SOCIAL, SURGICAL AND FAMILY HISTORY: She  has a past medical history of Arthritis (), Depressive disorder (), Epilepsy (H) (), Hypothyroidism (), and Hypothyroidism due to Hashimoto's thyroiditis (2018). She also has no past medical history of Cancer (H), Cerebral infarction (H), Congestive heart failure (H), COPD (chronic obstructive pulmonary disease) (H), Diabetes (H), Heart disease, History of blood transfusion, Hypertension, or Uncomplicated asthma.  She  has a past surgical history that includes  section (01) and Operative hysteroscopy (N/A, 2021).  Her family history includes Anxiety Disorder in her daughter and sister; Asthma in her daughter; Breast Cancer in her mother; Coronary Artery Disease in her father;  "Depression in her daughter, father, and sister; Diabetes in her brother; Hyperlipidemia in her father and mother; Hypertension in her father and mother; Other Cancer in her mother; Thyroid Disease in her sister.  She reports that she has never smoked. She has never used smokeless tobacco. She reports previous alcohol use. She reports that she does not use drugs.    ALLERGIES: She is allergic to molds & smuts, seafood, and sulfa drugs.    CURRENT MEDICATIONS: She has a current medication list which includes the following prescription(s): buspirone, levothyroxine, and sertraline.     REVIEW OF SYSTEMS: 10 point review of systems is negative except as noted above.     Exam:   Ht 1.448 m (4' 9\")   Wt 49 kg (108 lb)   LMP 04/06/2018 (Approximate)   BMI 23.37 kg/m             CONSTITUTIONAL: healthy, alert, mild distress and cooperative  HEAD: Normocephalic. No masses, lesions, tenderness or abnormalities  SKIN: no suspicious lesions or rashes  GAIT: antalgic  NEUROLOGIC: Non-focal, Normal muscle tone and strength, reflexes normal, sensation grossly normal.  PSYCHIATRIC: affect normal/bright and mentation appears normal.    MUSCULOSKELETAL: R>L knee pain      Inspection:       AP/lateral alignment normal  Tender: posterior, medial joint line on right      Non-tender: patellar facets, MCL, LCL, lateral joint line,  IT band  Active Range of Motion: all normal  Strength: quad  5-/5, Hamstrings  5-/5, Gastroc  5/5, Tibialis anterior  5/5, Peroneals  5/5 and core strength  5/5 hip abductors and other core muscles   Special tests: normal Valgus stress test, normal Varus, negative Lachman's test, negative Edna's, no apprehension with lateral stress of the patella.       Assessment/Plan:   Is a 56-year-old female with past medical history of hypothyroidism presenting with right greater than left knee pain  1.  Right greater than left knee mild arthritis- PF worse than other areas  Right knee is currently more problematic " and has posterior swelling similar to a Baker's cyst  Suspected degenerative changes to her medial meniscus  Patient is agreeable to physical therapy  She declines cortisone injection at this time  She can use ice, NSAIDs, Tylenol as needed  Lyme's disease - testing blood work    RTC 6 weeks  X-RAY INTERPRETATION:   X-Ray of the Right Knee: 3-view, Vidal, lateral, sunrise   ordered and interpreted in the office today was positive for IMPRESSION:   1. Narrowing medial aspect patellofemoral joint left worse than right.  2. Mild narrowing lateral femoral tibial joint space on the right.  3. Small knee effusion on the right.      Chacha Lim MD

## 2021-08-12 ENCOUNTER — THERAPY VISIT (OUTPATIENT)
Dept: PHYSICAL THERAPY | Facility: CLINIC | Age: 56
End: 2021-08-12
Payer: OTHER GOVERNMENT

## 2021-08-12 DIAGNOSIS — M25.561 KNEE PAIN, RIGHT: Primary | ICD-10-CM

## 2021-08-12 PROCEDURE — 97112 NEUROMUSCULAR REEDUCATION: CPT | Mod: GP | Performed by: PHYSICAL THERAPIST

## 2021-08-12 PROCEDURE — 97161 PT EVAL LOW COMPLEX 20 MIN: CPT | Mod: GP | Performed by: PHYSICAL THERAPIST

## 2021-08-12 PROCEDURE — 97016 VASOPNEUMATIC DEVICE THERAPY: CPT | Mod: GP | Performed by: PHYSICAL THERAPIST

## 2021-08-12 NOTE — PROGRESS NOTES
[facility]  Physical Therapy Initial Examination/Evaluation  August 12, 2021    Lety Alcantara is a 56 year old  female referred to physical therapy by Dr. Lim for treatment of knee pain with Precautions/Restrictions/MD instructions none    KEY PT FINDINGS:  1.  Decreased TKE, and end range flexion  2.  Medial PF OA  3.  Decrease in pain with reverse Whitmore    Subjective:  Referring MD visit date: 8/11/21  DOI/onset: six months ago  Mechanism of injury: Running; usually does outdoor workouts a few days a per week.   DOS none  Previous treatment: ibuprofen  Imaging: xray  Chief Complaint:   Pain with walking, unable to workout    Pain: best 2 /10, worst 8/10 medial knee Described as: aching, sharp Alleviated by: rest Frequency: intermittent Progression of symptoms since initial onset: improving Time of day when pain is worse: not related  Sleeping: needs ibuprofen      Occupation: [employer]  Job duties:  Standing at computer    Current HEP/exercise regimen: see above  Patient's goals are distance on trails, backpacking, outdoor workouts, weight lifting, sit on heels     Pertinent PMH: see epic   General Health Reported by Patient: good  Return to MD:  PRN         Lower Extremity Exam    Dynamic Movement Screen:  2 leg stance: decreased TKE on R  2 leg squat:Excessive weight shift to L limb noted, decreased depth secondary to pain      1 leg stance:   Right: unable secondary to pain  Left: normal    Gait: decreased tke on R, antalgic    Patellofemoral Joint Examination:  Test Right Left   Patellar Orientation:   90 deg flexion neutral neutral   OKC Patellar Tracking Crepitus Crepitus   Patellar Orientation:  0 deg flexion neutral neutral   Quadrant Mobility (Med:Lat) 1:1  1:1   Effusion 1+ 0   Quad Activation Poor Fair     Knee Joint AROM   Right Left Difference   Hyperextension 0 deg 0 deg 0 deg   Extension 6 deg 0 deg 6 deg   Flexion 125 deg 140 deg 15 deg     Palpation:    Tender to palpation at the following structures: medial patellar border and medial joint line    Hip Joint PROM Screen   Right Left Difference   Flex 110 deg 110 deg 0 deg   ER 70 deg 70 deg 0 deg   IR 30 deg 30 deg 0 deg         Foot Screen:   neutral calcaneal orientation        Assessment/Plan      Patient is a 56 year old female with right side knee complaints.    Patient has the following significant findings with corresponding treatment plan.                Diagnosis 1:  Knee pain    Pain -  hot/cold therapy, US, electric stimulation, manual therapy, splint/taping/bracing/orthotics, self management, education, and home program  Decreased ROM/flexibility - manual therapy and therapeutic exercise  Decreased joint mobility - manual therapy and therapeutic exercise  Decreased strength - therapeutic exercise and therapeutic activities  Impaired balance - neuro re-education and therapeutic activities  Decreased proprioception - neuro re-education and therapeutic activities  Inflammation - cold therapy  Edema - vasopneumatics  Impaired gait - gait training  Impaired muscle performance - neuro re-education  Decreased function - therapeutic activities    Therapy Evaluation Codes:   1) History comprised of:   Personal factors that impact the plan of care:      None.    Comorbidity factors that impact the plan of care are:      None.     Medications impacting care: None.  2) Examination of Body Systems comprised of:   Body structures and functions that impact the plan of care:      Knee.   Activity limitations that impact the plan of care are:      Running, Sports, Squatting/kneeling, Stairs, Standing, Walking and Sleeping.  3) Clinical presentation characteristics are:   Stable/Uncomplicated.  4) Decision-Making    Low complexity using standardized patient assessment instrument and/or measureable assessment of functional outcome.  Cumulative Therapy Evaluation is: Low complexity.    Previous and current functional  limitations:  (See Goal Flow Sheet for this information)    Short term and Long term goals: (See Goal Flow Sheet for this information)     Communication ability:  Patient appears to be able to clearly communicate and understand verbal and written communication and follow directions correctly.  Treatment Explanation - The following has been discussed with the patient:   RX ordered/plan of care  Anticipated outcomes  Possible risks and side effects  This patient would benefit from PT intervention to resume normal activities.   Rehab potential is good.    Frequency:  1 X week, once daily  Duration:  for 8 weeks  Discharge Plan:  Achieve all LTG.  Independent in home treatment program.  Reach maximal therapeutic benefit.    Please refer to the daily flowsheet for treatment today, total treatment time and time spent performing 1:1 timed codes.

## 2021-08-17 ENCOUNTER — THERAPY VISIT (OUTPATIENT)
Dept: PHYSICAL THERAPY | Facility: CLINIC | Age: 56
End: 2021-08-17
Payer: OTHER GOVERNMENT

## 2021-08-17 DIAGNOSIS — M25.561 KNEE PAIN, RIGHT: Primary | ICD-10-CM

## 2021-08-17 PROCEDURE — 97140 MANUAL THERAPY 1/> REGIONS: CPT | Mod: GP | Performed by: PHYSICAL THERAPY ASSISTANT

## 2021-08-17 PROCEDURE — 97530 THERAPEUTIC ACTIVITIES: CPT | Mod: GP | Performed by: PHYSICAL THERAPY ASSISTANT

## 2021-08-17 PROCEDURE — 97016 VASOPNEUMATIC DEVICE THERAPY: CPT | Mod: GP | Performed by: PHYSICAL THERAPY ASSISTANT

## 2021-08-17 PROCEDURE — 97110 THERAPEUTIC EXERCISES: CPT | Mod: GP | Performed by: PHYSICAL THERAPY ASSISTANT

## 2021-08-24 ENCOUNTER — THERAPY VISIT (OUTPATIENT)
Dept: PHYSICAL THERAPY | Facility: CLINIC | Age: 56
End: 2021-08-24
Payer: OTHER GOVERNMENT

## 2021-08-24 DIAGNOSIS — M25.561 KNEE PAIN, RIGHT: Primary | ICD-10-CM

## 2021-08-24 PROCEDURE — 97530 THERAPEUTIC ACTIVITIES: CPT | Mod: GP | Performed by: PHYSICAL THERAPY ASSISTANT

## 2021-08-24 PROCEDURE — 97110 THERAPEUTIC EXERCISES: CPT | Mod: GP | Performed by: PHYSICAL THERAPY ASSISTANT

## 2021-08-24 PROCEDURE — 97140 MANUAL THERAPY 1/> REGIONS: CPT | Mod: GP | Performed by: PHYSICAL THERAPY ASSISTANT

## 2021-09-01 ENCOUNTER — THERAPY VISIT (OUTPATIENT)
Dept: PHYSICAL THERAPY | Facility: CLINIC | Age: 56
End: 2021-09-01
Payer: OTHER GOVERNMENT

## 2021-09-01 DIAGNOSIS — M25.561 KNEE PAIN, RIGHT: Primary | ICD-10-CM

## 2021-09-01 PROCEDURE — 97530 THERAPEUTIC ACTIVITIES: CPT | Mod: GP | Performed by: PHYSICAL THERAPY ASSISTANT

## 2021-09-01 PROCEDURE — 97110 THERAPEUTIC EXERCISES: CPT | Mod: GP | Performed by: PHYSICAL THERAPY ASSISTANT

## 2021-09-05 ENCOUNTER — HEALTH MAINTENANCE LETTER (OUTPATIENT)
Age: 56
End: 2021-09-05

## 2022-02-07 DIAGNOSIS — M79.641 BILATERAL HAND PAIN: Primary | ICD-10-CM

## 2022-02-07 DIAGNOSIS — M79.642 BILATERAL HAND PAIN: Primary | ICD-10-CM

## 2022-02-07 NOTE — TELEPHONE ENCOUNTER
DIAGNOSIS: Bilateral hand concern for CTS   APPOINTMENT DATE: 2.8.22   NOTES STATUS DETAILS   OFFICE NOTE from referring provider N/A    OFFICE NOTE from other specialist N/A    DISCHARGE SUMMARY from hospital N/A    DISCHARGE REPORT from the ER N/A    OPERATIVE REPORT N/A    EMG report N/A    MEDICATION LIST Internal    MRI N/A    DEXA (osteoporosis/bone health) N/A    CT SCAN N/A    XRAYS (IMAGES & REPORTS) N/A

## 2022-02-08 ENCOUNTER — PRE VISIT (OUTPATIENT)
Dept: ORTHOPEDICS | Facility: CLINIC | Age: 57
End: 2022-02-08
Payer: OTHER GOVERNMENT

## 2022-02-08 ENCOUNTER — OFFICE VISIT (OUTPATIENT)
Dept: ORTHOPEDICS | Facility: CLINIC | Age: 57
End: 2022-02-08
Payer: OTHER GOVERNMENT

## 2022-02-08 ENCOUNTER — ANCILLARY PROCEDURE (OUTPATIENT)
Dept: GENERAL RADIOLOGY | Facility: CLINIC | Age: 57
End: 2022-02-08
Attending: FAMILY MEDICINE
Payer: OTHER GOVERNMENT

## 2022-02-08 DIAGNOSIS — M79.641 BILATERAL HAND PAIN: Primary | ICD-10-CM

## 2022-02-08 DIAGNOSIS — M79.642 BILATERAL HAND PAIN: Primary | ICD-10-CM

## 2022-02-08 PROCEDURE — 73130 X-RAY EXAM OF HAND: CPT | Mod: LT | Performed by: RADIOLOGY

## 2022-02-08 PROCEDURE — 99214 OFFICE O/P EST MOD 30 MIN: CPT | Performed by: FAMILY MEDICINE

## 2022-02-08 NOTE — LETTER
2/8/2022      RE: Lety Alcantara  [address]  [place] [address]         [facility]  SPORTS & ORTHOPEDIC CLINIC VISIT     Feb 8, 2022        ASSESSMENT & PLAN    55 yo with symptoms concerning for bilateral carpal tunnel syndrome    Reviewed imaging and assessment with patient in detail  Have referred to hand therapy  Recommended EMG as she seems interested in surgery at this time  Follow up with hand surgery    Kareem Oglesby MD  [facility] SPORTS MEDICINE [facility]    -----  Chief Complaint   Patient presents with     Right Hand - Pain     Left Hand - Pain       SUBJECTIVE  Lety Alcantara is a/an 56 year old female who is seen as a self referral for evaluation of  Bilateral hand pain.     The patient is seen by themselves.  The patient is Right handed    Onset: Chronic. Reports insidious onset without acute precipitating event. Having trouble with backpacking with poles, biking, running.   Location of Pain: bilateral fingers thumb through ring finger. No involvement of pinky  Worsened by: Running, biking, hiking  Better with: Na  Treatments tried: Bracing at night   Associated symptoms: numbness and tingling. No weakness.     Orthopedic/Surgical history: NO  Social History/Occupation: Works for [employer]       REVIEW OF SYSTEMS:    Do you have fever, chills, weight loss? No    Do you have any vision problems? No    Do you have any chest pain or edema? No    Do you have any shortness of breath or wheezing?  No    Do you have stomach problems? No    Do you have any numbness or focal weakness? No    Do you have diabetes? No    Do you have problems with bleeding or clotting? No    Do you have an rashes or other skin lesions? No    OBJECTIVE:  [facility] 04/06/2018 (Approximate)      General  - alert, pleasant, no distress  CV  - normal radial pulse, cap refill brisk  Musculoskeletal - bilateral wrist  - inspection: normal joint alignment, no obvious deformity, no  swelling  - palpation: no bony or soft tissue tenderness, no tenderness at the anatomical snuffbox  - ROM:  90 deg flexion   60 deg extension   25 deg abduction   65 deg adduction  - strength: 5/5  strength, 5/5 flexion, extension, pronation, supination, adduction, abduction  - special tests:  (+) Tinel's  (-) Finkelstein  (+) Phalen  (+) CMC grind    Neuro  - no sensory or motor deficit, grossly normal coordination, normal muscle tone  Skin  - no ecchymosis, erythema, warmth, or induration, no obvious rash      RADIOLOGY:    3 view xrays of bilateral hands performed and reviewed independently demonstrating first CMC and scattered IP djd bilaterally. See EMR for formal radiology report.                 Kareem Oglesby MD

## 2022-02-08 NOTE — LETTER
Date:February 9, 2022      Patient was self referred, no letter generated. Do not send.        [facility]

## 2022-02-08 NOTE — PROGRESS NOTES
[facility]  SPORTS & ORTHOPEDIC CLINIC VISIT     Feb 8, 2022        ASSESSMENT & PLAN    57 yo with symptoms concerning for bilateral carpal tunnel syndrome    Reviewed imaging and assessment with patient in detail  Have referred to hand therapy  Recommended EMG as she seems interested in surgery at this time  Follow up with hand surgery    MD CARIN Blackman [facility] SPORTS MEDICINE [facility]    -----  Chief Complaint   Patient presents with     Right Hand - Pain     Left Hand - Pain       SUBJECTIVE  Letypablo Alcantara is a/an 56 year old female who is seen as a self referral for evaluation of  Bilateral hand pain.     The patient is seen by themselves.  The patient is Right handed    Onset: Chronic. Reports insidious onset without acute precipitating event. Having trouble with backpacking with poles, biking, running.   Location of Pain: bilateral fingers thumb through ring finger. No involvement of pinky  Worsened by: Running, biking, hiking  Better with: Na  Treatments tried: Bracing at night   Associated symptoms: numbness and tingling. No weakness.     Orthopedic/Surgical history: NO  Social History/Occupation: Works for [employer]       REVIEW OF SYSTEMS:    Do you have fever, chills, weight loss? No    Do you have any vision problems? No    Do you have any chest pain or edema? No    Do you have any shortness of breath or wheezing?  No    Do you have stomach problems? No    Do you have any numbness or focal weakness? No    Do you have diabetes? No    Do you have problems with bleeding or clotting? No    Do you have an rashes or other skin lesions? No    OBJECTIVE:  [facility] 04/06/2018 (Approximate)      General  - alert, pleasant, no distress  CV  - normal radial pulse, cap refill brisk  Musculoskeletal - bilateral wrist  - inspection: normal joint alignment, no obvious deformity, no swelling  - palpation: no bony or soft tissue tenderness, no tenderness at the anatomical  snuffbox  - ROM:  90 deg flexion   60 deg extension   25 deg abduction   65 deg adduction  - strength: 5/5  strength, 5/5 flexion, extension, pronation, supination, adduction, abduction  - special tests:  (+) Tinel's  (-) Finkelstein  (+) Phalen  (+) CMC grind    Neuro  - no sensory or motor deficit, grossly normal coordination, normal muscle tone  Skin  - no ecchymosis, erythema, warmth, or induration, no obvious rash      RADIOLOGY:    3 view xrays of bilateral hands performed and reviewed independently demonstrating first CMC and scattered IP djd bilaterally. See EMR for formal radiology report.

## 2022-02-09 NOTE — TELEPHONE ENCOUNTER
RECORDS RECEIVED FROM: EMG follow up/results// consult hand surgery   DATE RECEIVED: Mar 8, 2022     NOTES STATUS DETAILS   OFFICE NOTE from referring provider Internal  Kareem Oglesby MD     OFFICE NOTE from other specialist Internal Darrel, MD Jeanine Canales Ashley M, OT     EMG Internal 3/2/22   MEDICATION LIST Internal    XRAYS (IMAGES & REPORTS) Internal 2/8/22

## 2022-02-10 ENCOUNTER — THERAPY VISIT (OUTPATIENT)
Dept: OCCUPATIONAL THERAPY | Facility: CLINIC | Age: 57
End: 2022-02-10
Attending: FAMILY MEDICINE
Payer: OTHER GOVERNMENT

## 2022-02-10 DIAGNOSIS — R20.0 BILATERAL HAND NUMBNESS: ICD-10-CM

## 2022-02-10 DIAGNOSIS — M79.642 BILATERAL HAND PAIN: ICD-10-CM

## 2022-02-10 DIAGNOSIS — M79.641 BILATERAL HAND PAIN: ICD-10-CM

## 2022-02-10 PROCEDURE — 97535 SELF CARE MNGMENT TRAINING: CPT | Mod: GO | Performed by: OCCUPATIONAL THERAPIST

## 2022-02-10 PROCEDURE — 97165 OT EVAL LOW COMPLEX 30 MIN: CPT | Mod: GO | Performed by: OCCUPATIONAL THERAPIST

## 2022-02-10 PROCEDURE — 97112 NEUROMUSCULAR REEDUCATION: CPT | Mod: GO | Performed by: OCCUPATIONAL THERAPIST

## 2022-02-10 PROCEDURE — 97110 THERAPEUTIC EXERCISES: CPT | Mod: GO | Performed by: OCCUPATIONAL THERAPIST

## 2022-02-10 NOTE — PROGRESS NOTES
Hand Therapy Initial Evaluation    Current Date:  2/10/2022    Diagnosis: Bilateral carpal tunnel syndrome  DOI: Ongoing for years (Order date: 22)  Referring physician: Kareem Oglesby MD    Precautions: None    Subjective:  Lety Alcantara is a 56 year old female.    Patient reports symptoms of the bilateral wrists and hands which occurred due to carpal tunnel syndrome. Patient reports symptoms since the age of 27. Since onset symptoms are gradually getting worse.  General health as reported by patient is good.  Pertinent medical history includes:  Past Medical History:   Diagnosis Date     Arthritis      Depressive disorder      Epilepsy (H)      Hypothyroidism      Hypothyroidism due to Hashimoto's thyroiditis 2018     Past Surgical History:   Procedure Laterality Date      SECTION  01     OPERATIVE HYSTEROSCOPY N/A 2021    Procedure: HYSTEROSCOPIC removal of intrauterine device;  Surgeon: Che Pizano MD;  Location: [facility] OR     Current Outpatient Medications   Medication     busPIRone (BUSPAR) 5 MG tablet     levothyroxine (SYNTHROID/LEVOTHROID) 112 MCG tablet     sertraline (ZOLOFT) 50 MG tablet     No current facility-administered medications for this visit.     Allergies   Allergen Reactions     Molds & Smuts Anaphylaxis     Seafood Anaphylaxis     Sulfa Drugs Rash       Occupational Profile Information:  Right hand dominant  Current occupation is a desk job at the [employer]  Job Tasks: Computer Work, Prolonged Sitting, Repetitive Tasks  Currently working in normal job without restrictions  Prior functional level:  no limitations  Mobility: No difficulty  Transportation: drives  Barriers include:none  Leisure activities/hobbies: Backpacking, pain with holding hiking poles; biking    Patient reports symptoms of numbness and tingling (thumb, index, long, and ring fingers)  Patient reported occupational performance deficits: household chores, sleeping, work ,  "driving , pulling, lifting and gripping  Special tests:  X-ray, per impression \"mild to moderate degenerative changes of the first carpometacarpal and triscaphe joints;\" upcoming EMG in March.    Previous treatment: None  Other: No symptoms with keyboard use, mousing can elicit symptoms; patient reports numbness of bilateral hands with gripping steering wheel; occasionally wears wrist braces at night, can provide symptom relief, but not always      Functional Outcome Measure:   Upper Extremity Functional Index Score:  SCORE: 57/80   (A lower score indicates greater disability.)        Objective:  Pain Level (Scale 0-10):   2/10/2022   At Rest 3-4/10   With Use 5-6/10, with gripping     Pain Description:  Date 2/10/2022   Location thumb, index finger, long finger and ring finger   Pain Quality Aching and Throbbing   Frequency intermittent     Pain is worst  Daytime, nighttime; wakes from sleep at night   Exacerbated by  Repetitive or prolonged gripping   Relieved by Wrist brace, changing position of wrist   Progression Gradually worsening over time.     Posture  Slightly rounded shoulders and forward neck posture.    Edema  None noted on exam.    Sensation  Decreased Median nerve distribution bilaterally per patient report.    ROM  Pain Report: - none  + mild    ++ moderate    +++ severe   Wrist 2/10/2022 2/10/2022   AROM (PROM) Left Right   Extension 47 55   Flexion 80+ 70+   RD 13 15   UD 43 40     Special Tests  Pain Report:  - none    + mild    ++ moderate    +++ severe    2/10/2022   Median Nerve Compression at Pronator NT   Carpal Compression Test--Durkan Test (30 sec) NT   Barrera Test for Lumbrical Incursion  (fist x 30 secs) R: +, 20 seconds  L: +, 20 seconds   Tinels at Carpal Tunnel R: +  L: -   Phalens R: NT  L: +, 15 seconds     Strength   (Measured in pounds)  Pain Report: - none  + mild    ++ moderate    +++ severe    2/10/2022 2/10/2022   Trials Left Right   1 41+ 42+     Lat Pinch 2/10/2022 " 2/10/2022   Trials Left Right   1 14 13+  Thumb CMC joint     3 Pt Pinch 2/10/2022 2/10/2022   Trials Left Right   1 9+  Thumb CMC joint 10+  Thumb CMC joint     Provocative Tests  Pain Report:  - none    + mild    ++ moderate    +++ severe     2/10/2022   Shoulder sign R: Present  L: Present   Palpation at CMC joint line R: +  L: +   CMC Adduction Stress Test R: -  L: +   CMC Extension Stress Test R: -  L: +         Assessment:  Patient presents with symptoms consistent with diagnosis of the above condition, with conservative intervention.     Patient's limitations or Problem List includes:  Pain, Sensory disturbance, Decreased  and Decreased pinch of the bilateral wrist and hand which interferes with the patient's ability to perform Work Tasks, Sleep Patterns, Recreational Activities, Household Chores and Driving  as compared to previous level of function.    Rehab Potential:  Excellent - Return to full activity, no limitations    Patient will benefit from skilled Occupational Therapy to increase  strength, pinch strength, stability of thumb and sensation and decrease pain to return to previous activity level and resume normal daily tasks and to reach their rehab potential.    Barriers to Learning:  No barrier    Communication Issues:  Patient appears to be able to clearly communicate and understand verbal and written communication and follow directions correctly.    Chart Review: Chart Review    Identified Performance Deficits: driving and community mobility, home establishment and management, meal preparation and cleanup, shopping, sleep, work and leisure activities    Assessment of Occupational Performance:  1-3 Performance Deficits    Clinical Decision Making (Complexity): Low complexity    Treatment Explanation:  The following has been discussed with the patient:  RX ordered/plan of care  Anticipated outcomes  Possible risks and side effects    Plan:  Frequency:  1 X week, once daily  Duration:  for 3  weeks    Treatment Plan:    Modalities:    US   Therapeutic Exercise:    AROM, PROM, Tendon Gliding, Extensor Tracking, Isotonics and Isometrics  Neuromuscular re-ed:   Nerve Gliding and Kinesiotaping  Manual Techniques:   Joint mobilization and Myofascial release  Orthotic Fabrication:    As indicated  Self Care:    Carpal tunnel prevention strategies    Discharge Plan:  Achieve all LTG.  Independent in home treatment program.  Reach maximal therapeutic benefit.    Home Exercise Program:  Tendon gliding, fist and FDS series  Median nerve gliding  Forearm flexor stretch  Massage to forearm flexors  Thumb stability- Isometric C and 1st dorsal interosseous exercise    Next Visit:  Progress thumb stability  MFR and transverse carpal ligament stretch

## 2022-03-02 ENCOUNTER — OFFICE VISIT (OUTPATIENT)
Dept: NEUROLOGY | Facility: CLINIC | Age: 57
End: 2022-03-02
Attending: FAMILY MEDICINE
Payer: OTHER GOVERNMENT

## 2022-03-02 DIAGNOSIS — M79.642 BILATERAL HAND PAIN: ICD-10-CM

## 2022-03-02 DIAGNOSIS — M79.641 BILATERAL HAND PAIN: ICD-10-CM

## 2022-03-02 PROCEDURE — 95910 NRV CNDJ TEST 7-8 STUDIES: CPT | Performed by: PSYCHIATRY & NEUROLOGY

## 2022-03-02 PROCEDURE — 95885 MUSC TST DONE W/NERV TST LIM: CPT | Performed by: PSYCHIATRY & NEUROLOGY

## 2022-03-02 NOTE — LETTER
3/2/2022       RE: Lety Alcantara  [address]  [facility] [address]     Dear Colleague,    Thank you for referring your patient, Lety Alcantara, to the [facility] [place] [facility]. Please see a copy of my visit note below.                        [facility]  Electrodiagnostic Laboratory                 Department of Neurology                                                                                                         Test Date:  3/2/2022    Patient: Lety Alcantara : 1965 Physician: Che Rojo MD   Sex: Female AGE: 56 year Ref Phys: Dr Oglesby   ID#: [id number]   Technician: Kristy Behling     Clinical Information:  Lety is a 56-year-old woman presenting for bilateral upper extremity EMG at the request of Dr. Oglesby. She reports many years of hand numbness worse over the past. Year. Using hiking poles makes the symptoms worse.     Techniques:  Motor and sensory conduction studies were done with surface recording electrodes. EMG was done with a concentric needle electrode.     Results:  Motor Nerve Conduction Studies:   Median and ulnar motor studies are within normal limits. Interossei stdies reveal prolonged distal latency for median nerve stimulation compared to ulnar nerve stimulation.    Sensory Nerve Conduction Studies:  Leroy studies reveal prolonged peak median latencies bilaterally. Antidromic median studies reveal slowed conduction velocities bilaterally with normal amplitude. Ulnar studies are within normal limits.    Needle Examination:  All examined muscles (as indicated in the following table) showed no evidence of electrical instability.        Interpretation:  The EMG is abnormal. The findings are consistent with mild median neuropathies at the wrist bilaterally. Clinically this can correlate with Carpal Tunnel Syndrome.      ___________________________  Che Rojo  MD        Nerve Conduction Studies  Motor Sites      Latency Amplitude Neg. Amp Diff Segment Distance Velocity Neg. Dur Neg Area Diff Temperature Comment   Site (ms) Norm (mV) Norm %  cm m/s Norm ms %  C    Left Median (APB) Motor   Wrist 4.2  < 4.2 7.8  > 5.0  Wrist-APB 8   4.8  32.2    Elbow 7.3 - 7.4 - -5.1 Elbow-Wrist 17 55  > 48 4.7 -5.8 32.2    Right Median (APB) Motor   Wrist 4.2  < 4.2 8.3  > 5.0  Wrist-APB 8   5.4  32.3    Elbow 7.7 - 7.9 - -4.8 Elbow-Wrist 19 54  > 48 5.4 -5.8 32.3    Left Median/Ulnar (Lumb-Dors Int II) Motor        Median (Lumb I)   Wrist 3.5 - 0.67 -      8.6  31.7         Ulnar (Dorsal Int (manus))   Wrist 3.0 - 2.4 -      4.6  31.8    Right Median/Ulnar (Lumb-Dors Int II) Motor        Median (Lumb I)   Wrist 3.9 - 0.80 -      5.4  31.7         Ulnar (Dorsal Int (manus))   Wrist 3.0 - 2.4 -      4.6  31.7    Left Ulnar (ADM) Motor   Wrist 2.4  < 3.5 8.3  > 5.0  Wrist-ADM 8   4.5  32.2    Bel Elbow 5.0 - 7.3 - -12.0 Bel Elbow-Wrist 16 62  > 48 4.5 -9.2 32.2    Abv Elbow 6.3 - 7.3 - 0 Abv Elbow-Bel Elbow 8 62  > 48 4.8 1.38 32.2    Right Ulnar (ADM) Motor   Wrist 2.5  < 3.5 8.9  > 5.0  Wrist-ADM 8   4.6  32.3    Bel Elbow 5.3 - 8.6 - -3.4 Bel Elbow-Wrist 17 61  > 48 4.7 -0.40 32.3    Abv Elbow 6.8 - 8.0 - -7.0 Abv Elbow-Bel Elbow 9 60  > 48 4.7 -5.7 32.3      Sensory Sites      Onset Lat Peak Lat Amp (O-P) Amp (P-P) Segment Distance Velocity Temperature   Site ms ms  V Norm  V  cm m/s Norm  C   Left Median Sensory   Wrist-Dig II 3.0 4.3 28  > 10 51 Wrist-Dig II 12 40  > 48 32.1   Right Median Sensory   Wrist-Dig II 2.9 4.1 33  > 10 56 Wrist-Dig II 12 41  > 48 30.7   Left Median-Ulnar Palmar Sensory        Median   Palm-Wrist 1.85 2.5 32 - 39 Palm-Wrist 8 43 - 32        Ulnar   Palm-Wrist 1.25 1.83 31 - 38 Palm-Wrist 8 64 - 32.1   Right Median-Ulnar Palmar Sensory        Median   Palm-Wrist 1.93 2.7 23 - 23 Palm-Wrist 8 41 - 32        Ulnar   Palm-Wrist 1.30 1.80 19 - 31 Palm-Wrist 8 62 -  32.1   Left Ulnar Sensory   Wrist-Dig V 2.1 2.8 29  > 8 43 Wrist-Dig V 12.5 60  > 48 32.1   Right Ulnar Sensory   Wrist-Dig V 1.95 2.8 36  > 8 24 Wrist-Dig V 12.5 64  > 48 28.7         Electromyography     Side Muscle Ins Act Fibs/PSW Fasc HF Amp Dur Poly Recrt Int Pat   Right Biceps Nml None Nml 0 Nml Nml 0 Nml Nml   Right Triceps Nml None Nml 0 Nml Nml 0 Nml Nml   Right EDC Nml None Nml 0 Nml Nml 0 Nml Nml   Right FDI Nml None Nml 0 Nml Nml 0 Nml Nml         NCS Waveforms:    Motor                    Sensory                      Ultrasound Images:            Again, thank you for allowing me to participate in the care of your patient.      Sincerely,    Che Rojo MD

## 2022-03-02 NOTE — PROGRESS NOTES
[facility]  Electrodiagnostic Laboratory                 Department of Neurology                                                                                                         Test Date:  3/2/2022    Patient: Leyt Alcantara : 1965 Physician: Che Rojo MD   Sex: Female AGE: 56 year Ref Phys: Dr Oglesby   ID#: [id number]   Technician: Kristy Behling     Clinical Information:  Lety is a 56-year-old woman presenting for bilateral upper extremity EMG at the request of Dr. Oglesby. She reports many years of hand numbness worse over the past. Year. Using hiking poles makes the symptoms worse.     Techniques:  Motor and sensory conduction studies were done with surface recording electrodes. EMG was done with a concentric needle electrode.     Results:  Motor Nerve Conduction Studies:   Median and ulnar motor studies are within normal limits. Interossei stdies reveal prolonged distal latency for median nerve stimulation compared to ulnar nerve stimulation.    Sensory Nerve Conduction Studies:  Leroy studies reveal prolonged peak median latencies bilaterally. Antidromic median studies reveal slowed conduction velocities bilaterally with normal amplitude. Ulnar studies are within normal limits.    Needle Examination:  All examined muscles (as indicated in the following table) showed no evidence of electrical instability.        Interpretation:  The EMG is abnormal. The findings are consistent with mild median neuropathies at the wrist bilaterally. Clinically this can correlate with Carpal Tunnel Syndrome.      ___________________________  Che Rojo MD        Nerve Conduction Studies  Motor Sites      Latency Amplitude Neg. Amp Diff Segment Distance Velocity Neg. Dur Neg Area Diff Temperature Comment   Site (ms) Norm (mV) Norm %  cm m/s Norm ms %  C    Left Median (APB) Motor   Wrist 4.2  < 4.2 7.8  > 5.0  Wrist-APB 8   4.8  32.2    Elbow 7.3 - 7.4 - -5.1 Elbow-Wrist  17 55  > 48 4.7 -5.8 32.2    Right Median (APB) Motor   Wrist 4.2  < 4.2 8.3  > 5.0  Wrist-APB 8   5.4  32.3    Elbow 7.7 - 7.9 - -4.8 Elbow-Wrist 19 54  > 48 5.4 -5.8 32.3    Left Median/Ulnar (Lumb-Dors Int II) Motor        Median (Lumb I)   Wrist 3.5 - 0.67 -      8.6  31.7         Ulnar (Dorsal Int (manus))   Wrist 3.0 - 2.4 -      4.6  31.8    Right Median/Ulnar (Lumb-Dors Int II) Motor        Median (Lumb I)   Wrist 3.9 - 0.80 -      5.4  31.7         Ulnar (Dorsal Int (manus))   Wrist 3.0 - 2.4 -      4.6  31.7    Left Ulnar (ADM) Motor   Wrist 2.4  < 3.5 8.3  > 5.0  Wrist-ADM 8   4.5  32.2    Bel Elbow 5.0 - 7.3 - -12.0 Bel Elbow-Wrist 16 62  > 48 4.5 -9.2 32.2    Abv Elbow 6.3 - 7.3 - 0 Abv Elbow-Bel Elbow 8 62  > 48 4.8 1.38 32.2    Right Ulnar (ADM) Motor   Wrist 2.5  < 3.5 8.9  > 5.0  Wrist-ADM 8   4.6  32.3    Bel Elbow 5.3 - 8.6 - -3.4 Bel Elbow-Wrist 17 61  > 48 4.7 -0.40 32.3    Abv Elbow 6.8 - 8.0 - -7.0 Abv Elbow-Bel Elbow 9 60  > 48 4.7 -5.7 32.3      Sensory Sites      Onset Lat Peak Lat Amp (O-P) Amp (P-P) Segment Distance Velocity Temperature   Site ms ms  V Norm  V  cm m/s Norm  C   Left Median Sensory   Wrist-Dig II 3.0 4.3 28  > 10 51 Wrist-Dig II 12 40  > 48 32.1   Right Median Sensory   Wrist-Dig II 2.9 4.1 33  > 10 56 Wrist-Dig II 12 41  > 48 30.7   Left Median-Ulnar Palmar Sensory        Median   Palm-Wrist 1.85 2.5 32 - 39 Palm-Wrist 8 43 - 32        Ulnar   Palm-Wrist 1.25 1.83 31 - 38 Palm-Wrist 8 64 - 32.1   Right Median-Ulnar Palmar Sensory        Median   Palm-Wrist 1.93 2.7 23 - 23 Palm-Wrist 8 41 - 32        Ulnar   Palm-Wrist 1.30 1.80 19 - 31 Palm-Wrist 8 62 - 32.1   Left Ulnar Sensory   Wrist-Dig V 2.1 2.8 29  > 8 43 Wrist-Dig V 12.5 60  > 48 32.1   Right Ulnar Sensory   Wrist-Dig V 1.95 2.8 36  > 8 24 Wrist-Dig V 12.5 64  > 48 28.7         Electromyography     Side Muscle Ins Act Fibs/PSW Fasc HF Amp Dur Poly Recrt Int Pat   Right Biceps Nml None Nml 0 Nml Nml 0 Nml Nml    Right Triceps Nml None Nml 0 Nml Nml 0 Nml Nml   Right EDC Nml None Nml 0 Nml Nml 0 Nml Nml   Right FDI Nml None Nml 0 Nml Nml 0 Nml Nml         NCS Waveforms:    Motor                    Sensory                      Ultrasound Images:

## 2022-03-08 ENCOUNTER — PRE VISIT (OUTPATIENT)
Dept: ORTHOPEDICS | Facility: CLINIC | Age: 57
End: 2022-03-08

## 2022-03-08 ENCOUNTER — OFFICE VISIT (OUTPATIENT)
Dept: ORTHOPEDICS | Facility: CLINIC | Age: 57
End: 2022-03-08
Payer: OTHER GOVERNMENT

## 2022-03-08 DIAGNOSIS — R20.0 BILATERAL HAND NUMBNESS: Primary | ICD-10-CM

## 2022-03-08 PROCEDURE — 99203 OFFICE O/P NEW LOW 30 MIN: CPT | Performed by: STUDENT IN AN ORGANIZED HEALTH CARE EDUCATION/TRAINING PROGRAM

## 2022-03-08 NOTE — LETTER
Date:March 10, 2022      Provider requested that no letter be sent. Do not send.       [facility]

## 2022-03-08 NOTE — LETTER
"    3/8/2022         RE: Lety Alcantara  [address]  [facility] [address]        Dear Colleague,    Thank you for referring your patient, Lety Alcantara, to the [facility] [place]. Please see a copy of my visit note below.    Ortho Hand    HPI: 56 year-old right hand dominant non-smoker presenting with bilateral fingertip tingling and numbness. Patient states that the fingertip symptoms have been mostly intermittent for decades, except the patient notices that they have worsened over the last 2 years. She has noticed worsening provocative symptoms when biking for long periods of time or backpacking with use of walking cane. She does have nighttime awakenings from hands \"falling asleep\". She will have more symptoms when driving and holding steering wheel for awhile. She has started splinting 2 weeks ago with some relief already.     ROS: Negative, see HPI  PMH: Hypothyroidism  PSH: No surgeries to the hands or wrists  Medications: Buspirone, Synthroid, Zoloft  Allergies: Sulfa  SH: Nonsmoker, no other tobacco or nicotine use  FH: No bleeding or clotting issues, or problems with anesthesia    Examination:  Nonlabored breathing  Not distressed  Can make composite fists bilaterally  Strong 5/5 bilateral thumb opposition and hand intrinsics  Negative bilateral wrist Tinel's but positive bilateral Durkan's tests  Negative elbow Tinel's and elbow flexion tests    NCS/EMG 3/2022: Mild bilateral carpal tunnel syndrome. BL motor latencies of 4.2 msec. Slightly prolonged sensory latencies.     A/P: 56F p/w BL mild CTS    -Discussed the options for treatment, including: continued observation, activity modification, splinting, stretching and surgery. Since the symptoms have responded to short-term splinting, it is reasonable to continue splinting and start lumbrical stretching for an additional 4-6 weeks. Patient would like to try this.   -Instructed patient on how to perform lumbrical " stretches  -Clinic in 4-6 weeks for re-evaluation  -A total of 30 minutes was devoted to review of chart, direct face-to-face patient counseling and documentation during this encounter    Henry Schuler MD, PhD        Again, thank you for allowing me to participate in the care of your patient.        Sincerely,        Henry Schuler MD

## 2022-03-08 NOTE — NURSING NOTE
Reason For Visit:   Chief Complaint   Patient presents with     RECHECK     EMG results        Primary MD: Massiel Berger (Inactive)  Ref. MD: est     Age: 56 year old    ?  No      LMP 04/06/2018 (Approximate)       Pain Assessment  Patient Currently in Pain: No               QuickDASH Assessment  No flowsheet data found.       Current Outpatient Medications   Medication Sig Dispense Refill     busPIRone (BUSPAR) 5 MG tablet Take 1 tablet (5 mg) by mouth 2 times daily 180 tablet 3     levothyroxine (SYNTHROID/LEVOTHROID) 112 MCG tablet Take 1 tablet (112 mcg) by mouth daily 90 tablet 3     sertraline (ZOLOFT) 50 MG tablet Take 1 tablet (50 mg) by mouth daily 90 tablet 3       Allergies   Allergen Reactions     Molds & Smuts Anaphylaxis     Seafood Anaphylaxis     Sulfa Drugs Rash       Raven Vazquez, ATC

## 2022-03-08 NOTE — PROGRESS NOTES
"Ortho Hand    HPI: 56 year-old right hand dominant non-smoker presenting with bilateral fingertip tingling and numbness. Patient states that the fingertip symptoms have been mostly intermittent for decades, except the patient notices that they have worsened over the last 2 years. She has noticed worsening provocative symptoms when biking for long periods of time or backpacking with use of walking cane. She does have nighttime awakenings from hands \"falling asleep\". She will have more symptoms when driving and holding steering wheel for awhile. She has started splinting 2 weeks ago with some relief already.     ROS: Negative, see HPI  PMH: Hypothyroidism  PSH: No surgeries to the hands or wrists  Medications: Buspirone, Synthroid, Zoloft  Allergies: Sulfa  SH: Nonsmoker, no other tobacco or nicotine use  FH: No bleeding or clotting issues, or problems with anesthesia    Examination:  Nonlabored breathing  Not distressed  Can make composite fists bilaterally  Strong 5/5 bilateral thumb opposition and hand intrinsics  Negative bilateral wrist Tinel's but positive bilateral Durkan's tests  Negative elbow Tinel's and elbow flexion tests    NCS/EMG 3/2022: Mild bilateral carpal tunnel syndrome. BL motor latencies of 4.2 msec. Slightly prolonged sensory latencies.     A/P: 56F p/w BL mild CTS    -Discussed the options for treatment, including: continued observation, activity modification, splinting, stretching and surgery. Since the symptoms have responded to short-term splinting, it is reasonable to continue splinting and start lumbrical stretching for an additional 4-6 weeks. Patient would like to try this.   -Instructed patient on how to perform lumbrical stretches  -Clinic in 4-6 weeks for re-evaluation  -A total of 30 minutes was devoted to review of chart, direct face-to-face patient counseling and documentation during this encounter    Henry Schuler MD, PhD    "

## 2022-04-17 ENCOUNTER — HEALTH MAINTENANCE LETTER (OUTPATIENT)
Age: 57
End: 2022-04-17

## 2022-06-29 DIAGNOSIS — F41.1 GAD (GENERALIZED ANXIETY DISORDER): ICD-10-CM

## 2022-06-30 PROBLEM — M79.642 BILATERAL HAND PAIN: Status: RESOLVED | Noted: 2022-02-10 | Resolved: 2022-06-30

## 2022-06-30 PROBLEM — M79.641 BILATERAL HAND PAIN: Status: RESOLVED | Noted: 2022-02-10 | Resolved: 2022-06-30

## 2022-06-30 PROBLEM — R20.0 BILATERAL HAND NUMBNESS: Status: RESOLVED | Noted: 2022-02-10 | Resolved: 2022-06-30

## 2022-06-30 RX ORDER — BUSPIRONE HYDROCHLORIDE 5 MG/1
5 TABLET ORAL 2 TIMES DAILY
Qty: 180 TABLET | Refills: 0 | Status: SHIPPED | OUTPATIENT
Start: 2022-06-30 | End: 2024-01-12

## 2022-06-30 NOTE — TELEPHONE ENCOUNTER
Prescription approved per [facility] Refill Protocol.  HARPER Olea RN  [facility]

## 2022-08-07 ENCOUNTER — HEALTH MAINTENANCE LETTER (OUTPATIENT)
Age: 57
End: 2022-08-07

## 2022-08-08 ENCOUNTER — MYC MEDICAL ADVICE (OUTPATIENT)
Dept: FAMILY MEDICINE | Facility: CLINIC | Age: 57
End: 2022-08-08

## 2022-08-08 NOTE — TELEPHONE ENCOUNTER
Since I am leaving [facility] I am not taking on new patients. Please help her schedule this with another Provider perhaps at [place] where her Primary Care Provider was, and can discuss lab work there.     Thanks,   Savanah GUADALUPE CNP

## 2022-08-10 NOTE — TELEPHONE ENCOUNTER
Savanah -     See [employer] message -     Ok for patient to scheduled lab draw prior to appt 8/31 in order to discuss results during appt?       Thank you  Dyana Gregg RN  [facility]

## 2022-08-15 NOTE — TELEPHONE ENCOUNTER
Okay, there is enough she wants done here that we should do the appointment first before the lab work please    Thanks,   Savanah GUADALUPE CNP

## 2022-08-16 ENCOUNTER — VIRTUAL VISIT (OUTPATIENT)
Dept: FAMILY MEDICINE | Facility: CLINIC | Age: 57
End: 2022-08-16
Payer: OTHER GOVERNMENT

## 2022-08-16 DIAGNOSIS — M54.41 RIGHT-SIDED LOW BACK PAIN WITH RIGHT-SIDED SCIATICA, UNSPECIFIED CHRONICITY: ICD-10-CM

## 2022-08-16 DIAGNOSIS — E06.3 HYPOTHYROIDISM DUE TO HASHIMOTO'S THYROIDITIS: Primary | ICD-10-CM

## 2022-08-16 DIAGNOSIS — M25.561 PAIN IN BOTH KNEES, UNSPECIFIED CHRONICITY: ICD-10-CM

## 2022-08-16 DIAGNOSIS — M25.562 PAIN IN BOTH KNEES, UNSPECIFIED CHRONICITY: ICD-10-CM

## 2022-08-16 DIAGNOSIS — F41.1 GAD (GENERALIZED ANXIETY DISORDER): ICD-10-CM

## 2022-08-16 DIAGNOSIS — Z12.11 SCREEN FOR COLON CANCER: ICD-10-CM

## 2022-08-16 DIAGNOSIS — R53.83 FATIGUE, UNSPECIFIED TYPE: ICD-10-CM

## 2022-08-16 DIAGNOSIS — Z12.31 VISIT FOR SCREENING MAMMOGRAM: ICD-10-CM

## 2022-08-16 DIAGNOSIS — M25.50 MULTIPLE JOINT PAIN: ICD-10-CM

## 2022-08-16 DIAGNOSIS — Z11.59 NEED FOR HEPATITIS C SCREENING TEST: ICD-10-CM

## 2022-08-16 DIAGNOSIS — Z11.4 SCREENING FOR HIV (HUMAN IMMUNODEFICIENCY VIRUS): ICD-10-CM

## 2022-08-16 PROCEDURE — 99214 OFFICE O/P EST MOD 30 MIN: CPT | Mod: 95 | Performed by: NURSE PRACTITIONER

## 2022-08-16 RX ORDER — SERTRALINE HYDROCHLORIDE 25 MG/1
25 TABLET, FILM COATED ORAL DAILY
Qty: 90 TABLET | Refills: 1 | Status: SHIPPED | OUTPATIENT
Start: 2022-08-16 | End: 2022-10-20

## 2022-08-16 NOTE — PROGRESS NOTES
Lety is a 57 year old who is being evaluated via a billable video visit.      How would you like to obtain your AVS? MyChart  If the video visit is dropped, the invitation should be resent by:   Will anyone else be joining your video visit?         Assessment & Plan       ICD-10-CM    1. Hypothyroidism due to Hashimoto's thyroiditis  E03.8     E06.3    2. Screening for HIV (human immunodeficiency virus)  Z11.4    3. Need for hepatitis C screening test  Z11.59    4. Visit for screening mammogram  Z12.31 MA SCREENING DIGITAL BILAT - Future  (s+30)   5. Screen for colon cancer  Z12.11 COLOGUARD(EXACT SCIENCES)   6. Multiple joint pain  M25.50 CRP, inflammation     ESR: Erythrocyte sedimentation rate     Rheumatoid factor     Anti Nuclear Betina IgG by IFA with Reflex     Lyme Disease Total Abs Bld with Reflex to Confirm CLIA   7. Right-sided low back pain with right-sided sciatica, unspecified chronicity  M54.41    8. Pain in both knees, unspecified chronicity  M25.561     M25.562    9. AMBROSE (generalized anxiety disorder)  F41.1 sertraline (ZOLOFT) 25 MG tablet     Vitamin D Deficiency   10. Fatigue, unspecified type  R53.83 Lyme Disease Total Abs Bld with Reflex to Confirm CLIA     TSH with free T4 reflex     Lipid panel reflex to direct LDL Fasting     Comprehensive metabolic panel (BMP + Alb, Alk Phos, ALT, AST, Total. Bili, TP)     CBC with platelets and differential    unclear whether fatigue is related to thyroid, Has enough med until can get lab work done . She is weaning off depression and anxiety med and low appetitie, but feels otherwise good on zoloft 25 mg so doesn't feel it's related   Diet and excise great, not taking D so she agrees would like to know level  Numerous joint pains will rule out differentials and follow up as indicated in mychart, may need follow up visit if arthritis follow up or separate issues    Aware I am leaving  and she may establish with me at [facility] because dtr goes to [place]  cl[facility]                See Patient Instructions    No follow-ups on file.    ANAYELI Blanchard CNP  [facility]    Sailaja Davidson is a 57 year old, presenting for the following health issues:  Results      HPI     Fatigue and low appetitie for some time now, many months   Joint pains in different spots wondering about inflammation, doesn't think anemia  Eggs and meat daily, LDL was high and hasn't really changed anything   Healthy diet overall   Not taking D or monitoring Calcium, has health maintenance exam soon and wants fasting lab work prior    Due for thyroid check up and not sure if symptoms or not    No sob or chest pain, otherwise healthy     Review of Systems   Constitutional, HEENT, cardiovascular, pulmonary, GI, , musculoskeletal, neuro, skin, endocrine and psych systems are negative, except as otherwise noted.      Objective           Vitals:  No vitals were obtained today due to virtual visit.    Physical Exam   GENERAL: Healthy, alert and no distress  EYES: Eyes grossly normal to inspection.  No discharge or erythema, or obvious scleral/conjunctival abnormalities.  RESP: No audible wheeze, cough, or visible cyanosis.  No visible retractions or increased work of breathing.    SKIN: Visible skin clear. No significant rash, abnormal pigmentation or lesions.  NEURO: Cranial nerves grossly intact.  Mentation and speech appropriate for age.  PSYCH: Mentation appears normal, affect normal/bright, judgement and insight intact, normal speech and appearance well-groomed.    Admission on 08/02/2021, Discharged on 08/02/2021   Component Date Value Ref Range Status     GLUCOSE BY METER POCT 08/02/2021 85  70 - 99 mg/dL Final               Video-Visit Details    Video Start Time: Start: 08/16/2022 10:44 am  Stop: 08/16/2022 11:01 am    Type of service:  Video Visit    Video End Time:    Originating Location (pt. Location): Home    Distant Location (provider location):  [facility]  [facility]     Platform used for Video Visit: Nayla Weiss.

## 2022-08-18 ENCOUNTER — LAB (OUTPATIENT)
Dept: LAB | Facility: CLINIC | Age: 57
End: 2022-08-18
Payer: OTHER GOVERNMENT

## 2022-08-18 DIAGNOSIS — R53.83 FATIGUE, UNSPECIFIED TYPE: ICD-10-CM

## 2022-08-18 DIAGNOSIS — M25.50 MULTIPLE JOINT PAIN: ICD-10-CM

## 2022-08-18 DIAGNOSIS — F41.1 GAD (GENERALIZED ANXIETY DISORDER): ICD-10-CM

## 2022-08-18 LAB
ALBUMIN SERPL-MCNC: 3.8 G/DL (ref 3.4–5)
ALP SERPL-CCNC: 62 U/L (ref 40–150)
ALT SERPL W P-5'-P-CCNC: 19 U/L (ref 0–50)
ANION GAP SERPL CALCULATED.3IONS-SCNC: 11 MMOL/L (ref 3–14)
AST SERPL W P-5'-P-CCNC: 19 U/L (ref 0–45)
B BURGDOR IGG+IGM SER QL: 0.27
BASOPHILS # BLD AUTO: 0 10E3/UL (ref 0–0.2)
BASOPHILS NFR BLD AUTO: 1 %
BILIRUB SERPL-MCNC: 0.4 MG/DL (ref 0.2–1.3)
BUN SERPL-MCNC: 14 MG/DL (ref 7–30)
CALCIUM SERPL-MCNC: 9.4 MG/DL (ref 8.5–10.1)
CHLORIDE BLD-SCNC: 100 MMOL/L (ref 94–109)
CHOLEST SERPL-MCNC: 212 MG/DL
CO2 SERPL-SCNC: 21 MMOL/L (ref 20–32)
CREAT SERPL-MCNC: 0.65 MG/DL (ref 0.52–1.04)
CRP SERPL-MCNC: <2.9 MG/L (ref 0–8)
DEPRECATED CALCIDIOL+CALCIFEROL SERPL-MC: 55 UG/L (ref 20–75)
EOSINOPHIL # BLD AUTO: 0.1 10E3/UL (ref 0–0.7)
EOSINOPHIL NFR BLD AUTO: 2 %
ERYTHROCYTE [DISTWIDTH] IN BLOOD BY AUTOMATED COUNT: 12.6 % (ref 10–15)
ERYTHROCYTE [SEDIMENTATION RATE] IN BLOOD BY WESTERGREN METHOD: 10 MM/HR (ref 0–30)
FASTING STATUS PATIENT QL REPORTED: YES
GFR SERPL CREATININE-BSD FRML MDRD: >90 ML/MIN/1.73M2
GLUCOSE BLD-MCNC: 93 MG/DL (ref 70–99)
HCT VFR BLD AUTO: 38.8 % (ref 35–47)
HDLC SERPL-MCNC: 53 MG/DL
HGB BLD-MCNC: 13 G/DL (ref 11.7–15.7)
LDLC SERPL CALC-MCNC: 141 MG/DL
LYMPHOCYTES # BLD AUTO: 1.7 10E3/UL (ref 0.8–5.3)
LYMPHOCYTES NFR BLD AUTO: 45 %
MCH RBC QN AUTO: 29.7 PG (ref 26.5–33)
MCHC RBC AUTO-ENTMCNC: 33.5 G/DL (ref 31.5–36.5)
MCV RBC AUTO: 89 FL (ref 78–100)
MONOCYTES # BLD AUTO: 0.3 10E3/UL (ref 0–1.3)
MONOCYTES NFR BLD AUTO: 8 %
NEUTROPHILS # BLD AUTO: 1.7 10E3/UL (ref 1.6–8.3)
NEUTROPHILS NFR BLD AUTO: 44 %
NONHDLC SERPL-MCNC: 159 MG/DL
PLATELET # BLD AUTO: 300 10E3/UL (ref 150–450)
POTASSIUM BLD-SCNC: 3.9 MMOL/L (ref 3.4–5.3)
PROT SERPL-MCNC: 7.5 G/DL (ref 6.8–8.8)
RBC # BLD AUTO: 4.37 10E6/UL (ref 3.8–5.2)
SODIUM SERPL-SCNC: 132 MMOL/L (ref 133–144)
TRIGL SERPL-MCNC: 92 MG/DL
TSH SERPL DL<=0.005 MIU/L-ACNC: 0.54 MU/L (ref 0.4–4)
WBC # BLD AUTO: 3.9 10E3/UL (ref 4–11)

## 2022-08-18 PROCEDURE — 86618 LYME DISEASE ANTIBODY: CPT

## 2022-08-18 PROCEDURE — 86038 ANTINUCLEAR ANTIBODIES: CPT

## 2022-08-18 PROCEDURE — 85652 RBC SED RATE AUTOMATED: CPT

## 2022-08-18 PROCEDURE — 86140 C-REACTIVE PROTEIN: CPT

## 2022-08-18 PROCEDURE — 86431 RHEUMATOID FACTOR QUANT: CPT

## 2022-08-18 PROCEDURE — 36415 COLL VENOUS BLD VENIPUNCTURE: CPT

## 2022-08-18 PROCEDURE — 86039 ANTINUCLEAR ANTIBODIES (ANA): CPT

## 2022-08-18 PROCEDURE — 80061 LIPID PANEL: CPT

## 2022-08-18 PROCEDURE — 80050 GENERAL HEALTH PANEL: CPT

## 2022-08-18 PROCEDURE — 82306 VITAMIN D 25 HYDROXY: CPT

## 2022-08-19 LAB
ANA PAT SER IF-IMP: ABNORMAL
ANA SER QL IF: POSITIVE
ANA TITR SER IF: ABNORMAL {TITER}
RHEUMATOID FACT SER NEPH-ACNC: <6 IU/ML

## 2022-08-21 ENCOUNTER — MYC MEDICAL ADVICE (OUTPATIENT)
Dept: FAMILY MEDICINE | Facility: CLINIC | Age: 57
End: 2022-08-21

## 2022-08-21 DIAGNOSIS — Z00.00 ROUTINE GENERAL MEDICAL EXAMINATION AT A HEALTH CARE FACILITY: ICD-10-CM

## 2022-08-21 DIAGNOSIS — E06.3 HYPOTHYROIDISM DUE TO HASHIMOTO'S THYROIDITIS: ICD-10-CM

## 2022-08-22 NOTE — TELEPHONE ENCOUNTER
Routed to POD    See pt Mychart message, see lab note from provider    Ginny Cunningham RN   [facility]

## 2022-08-28 NOTE — TELEPHONE ENCOUNTER
Pt requested the visit for thyroid and other lab work, will need another appointment to discuss whether Rheum is appropriate as I did not see psoriasis as she was calling it, or any indication her joint pains are systemic cause    Will follow up on 8/31, replied to pt

## 2022-08-30 RX ORDER — LEVOTHYROXINE SODIUM 112 UG/1
112 TABLET ORAL DAILY
Qty: 90 TABLET | Refills: 3 | Status: SHIPPED | OUTPATIENT
Start: 2022-08-30 | End: 2023-09-15

## 2022-08-31 ENCOUNTER — OFFICE VISIT (OUTPATIENT)
Dept: FAMILY MEDICINE | Facility: CLINIC | Age: 57
End: 2022-08-31
Payer: OTHER GOVERNMENT

## 2022-08-31 VITALS
OXYGEN SATURATION: 100 % | BODY MASS INDEX: 23.51 KG/M2 | DIASTOLIC BLOOD PRESSURE: 78 MMHG | RESPIRATION RATE: 16 BRPM | HEART RATE: 53 BPM | SYSTOLIC BLOOD PRESSURE: 126 MMHG | TEMPERATURE: 98.2 F | WEIGHT: 109 LBS | HEIGHT: 57 IN

## 2022-08-31 DIAGNOSIS — Z00.00 ROUTINE GENERAL MEDICAL EXAMINATION AT A HEALTH CARE FACILITY: Primary | ICD-10-CM

## 2022-08-31 PROCEDURE — 99396 PREV VISIT EST AGE 40-64: CPT | Performed by: NURSE PRACTITIONER

## 2022-08-31 ASSESSMENT — ENCOUNTER SYMPTOMS
SORE THROAT: 0
PALPITATIONS: 0
PARESTHESIAS: 0
DYSURIA: 0
HEMATURIA: 0
DIZZINESS: 0
MYALGIAS: 0
JOINT SWELLING: 1
BREAST MASS: 0
EYE PAIN: 0
CHILLS: 0
FEVER: 0
ARTHRALGIAS: 1
NAUSEA: 0
DIARRHEA: 0
NERVOUS/ANXIOUS: 0
WEAKNESS: 0
CONSTIPATION: 0
HEMATOCHEZIA: 0
HEADACHES: 1
FREQUENCY: 0
ABDOMINAL PAIN: 0
COUGH: 0
SHORTNESS OF BREATH: 0
HEARTBURN: 0

## 2022-08-31 ASSESSMENT — PAIN SCALES - GENERAL: PAINLEVEL: MILD PAIN (2)

## 2022-08-31 NOTE — PROGRESS NOTES
SUBJECTIVE:   CC: Lety Alcantara is an 57 year old woman who presents for preventive health visit.       Patient has been advised of split billing requirements and indicates understanding: Yes  Healthy Habits:     Getting at least 3 servings of Calcium per day:  Yes    Bi-annual eye exam:  Yes    Dental care twice a year:  NO    Sleep apnea or symptoms of sleep apnea:  None    Diet:  Gluten-free/reduced    Frequency of exercise:  6-7 days/week    Duration of exercise:  45-60 minutes    Taking medications regularly:  Yes    Medication side effects:  Not applicable    PHQ-2 Total Score: 0    Additional concerns today:  No          Today's PHQ-2 Score:   PHQ-2 ( 1999 Pfizer) 8/31/2022   Q1: Little interest or pleasure in doing things 0   Q2: Feeling down, depressed or hopeless 0   PHQ-2 Score 0   PHQ-2 Total Score (12-17 Years)- Positive if 3 or more points; Administer PHQ-A if positive -   Q1: Little interest or pleasure in doing things Not at all   Q2: Feeling down, depressed or hopeless Not at all   PHQ-2 Score 0       Abuse: Current or Past (Physical, Sexual or Emotional) -   Do you feel safe in your environment? 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 Not Applicable   Prescreen: >3 drinks/day or >7 drinks/week? -       Reviewed orders with patient.  Reviewed health maintenance and updated orders accordingly - Yes  BP Readings from Last 3 Encounters:   08/31/22 126/78   08/02/21 130/79   07/26/21 120/80    Wt Readings from Last 3 Encounters:   08/31/22 49.4 kg (109 lb)   08/11/21 49 kg (108 lb)   08/02/21 49.2 kg (108 lb 7.5 oz)                  Patient Active Problem List   Diagnosis     Mood swings     Seizures (H)     Hypothyroidism due to Hashimoto's thyroiditis      IUD (intrauterine device) in place, Paragard     Right-sided low back pain with right-sided sciatica, unspecified chronicity     Mechanical breakdown of intrauterine contraceptive device, initial encounter     Past Surgical History:   Procedure Laterality Date      SECTION  01     OPERATIVE HYSTEROSCOPY N/A 2021    Procedure: HYSTEROSCOPIC removal of intrauterine device;  Surgeon: Che Pizano MD;  Location: [facility] OR       Social History     Tobacco Use     Smoking status: Never Smoker     Smokeless tobacco: Never Used   Substance Use Topics     Alcohol use: Not Currently     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     Family History   Problem Relation Age of Onset     Diabetes Brother      Coronary Artery Disease Father      Hypertension Father      Hyperlipidemia Father      Depression Father      Hypertension Mother      Hyperlipidemia Mother      Breast Cancer Mother         over 70 when had breast cancer     Other Cancer Mother      Depression Sister      Anxiety Disorder Sister      Depression Daughter      Anxiety Disorder Daughter      Asthma Daughter      Thyroid Disease Sister          Current Outpatient Medications   Medication Sig Dispense Refill     busPIRone (BUSPAR) 5 MG tablet Take 1 tablet (5 mg) by mouth 2 times daily 180 tablet 0     levothyroxine (SYNTHROID/LEVOTHROID) 112 MCG tablet Take 1 tablet (112 mcg) by mouth daily 90 tablet 3     sertraline (ZOLOFT) 25 MG tablet Take 1 tablet (25 mg) by mouth daily 90 tablet 1     Allergies   Allergen Reactions     Molds & Smuts Anaphylaxis     Seafood Anaphylaxis     Sulfa Drugs Rash       Breast Cancer Screening:    Breast CA Risk Assessment (FHS-7) 2021   Do you have a family history of breast, colon, or ovarian cancer? Yes No / Unknown         Mammogram Screening: Recommended mammography every 1-2 years with patient discussion and risk factor consideration  Pertinent mammograms are reviewed under the imaging  tab.    History of abnormal Pap smear: NO - age 30-65 PAP every 5 years with negative HPV co-testing recommended  PAP / HPV Latest Ref Rng & Units 2021 2018 5/15/2015   PAP (Historical) - NIL NIL NIL   HPV16 NEG:Negative Negative Negative Negative   HPV18 NEG:Negative Negative Negative Negative   HRHPV NEG:Negative Negative Negative Negative     Reviewed and updated as needed this visit by clinical staff                    Reviewed and updated as needed this visit by Provider                   Past Medical History:   Diagnosis Date     Arthritis 2017     Depressive disorder      Epilepsy (H)      Hypothyroidism      Hypothyroidism due to Hashimoto's thyroiditis 2018      Past Surgical History:   Procedure Laterality Date      SECTION  01     OPERATIVE HYSTEROSCOPY N/A 2021    Procedure: HYSTEROSCOPIC removal of intrauterine device;  Surgeon: Che Pizano MD;  Location: [facility] OR     OB History    Para Term  AB Living   1 0 0 0 0 1   SAB IAB Ectopic Multiple Live Births   0 0 0 0 1      # Outcome Date GA Lbr Leo/2nd Weight Sex Delivery Anes PTL Lv   1                 Review of Systems   Constitutional: Negative for chills and fever.   HENT: Negative for congestion, ear pain, hearing loss and sore throat.    Eyes: Negative for pain and visual disturbance.   Respiratory: Negative for cough and shortness of breath.    Cardiovascular: Negative for chest pain, palpitations and peripheral edema.   Gastrointestinal: Negative for abdominal pain, constipation, diarrhea, heartburn, hematochezia and nausea.   Breasts:  Negative for tenderness, breast mass and discharge.   Genitourinary: Negative for dysuria, frequency, genital sores, hematuria, pelvic pain, urgency, vaginal bleeding and vaginal discharge.   Musculoskeletal: Positive for arthralgias and joint swelling. Negative for myalgias.   Skin: Negative for rash.   Neurological: Positive for headaches.  "Negative for dizziness, weakness and paresthesias.   Psychiatric/Behavioral: Negative for mood changes. The patient is not nervous/anxious.    energy and joint pain about the same or a bit better since last visit, thought was thyroid symptoms but lab work stable, thinking could be from stress and working on water intake       OBJECTIVE:   /78   Pulse 53   Temp 98.2  F (36.8  C) (Temporal)   Resp 16   Ht 1.448 m (4' 9\")   Wt 49.4 kg (109 lb)   LMP 04/06/2018 (Approximate)   SpO2 100%   BMI 23.59 kg/m    Physical Exam  GENERAL: healthy, alert and no distress  EYES: Eyes grossly normal to inspection, PERRL and conjunctivae and sclerae normal  HENT: ear canals and TM's normal, nose and mouth without ulcers or lesions  NECK: no adenopathy, no asymmetry, masses, or scars and thyroid normal to palpation  RESP: lungs clear to auscultation - no rales, rhonchi or wheezes  BREAST: normal without masses, tenderness or nipple discharge and no palpable axillary masses or adenopathy  CV: regular rate and rhythm, normal S1 S2, no S3 or S4, no murmur, click or rub, no peripheral edema and peripheral pulses strong  ABDOMEN: soft, nontender, no hepatosplenomegaly, no masses and bowel sounds normal  MS: no gross musculoskeletal defects noted, no edema  SKIN: no suspicious lesions or rashes  NEURO: Normal strength and tone, mentation intact and speech normal  PSYCH: mentation appears normal, affect normal/bright    Diagnostic Test Results:  Labs reviewed in Epic    ASSESSMENT/PLAN:       ICD-10-CM    1. Routine general medical examination at a health care facility  Z00.00      Consider thyroid lab again in 3-6 months if fatigue and joint pain still an issue, or send mychart message if does want PT or Rheum referral, at this point mutual decision this is not needed as symptoms very mild, will monitor    Will consider shingles, declines today    COUNSELING:  Reviewed preventive health counseling, as reflected in patient " "instructions    Estimated body mass index is 23.37 kg/m  as calculated from the following:    Height as of 8/11/21: 1.448 m (4' 9\").    Weight as of 8/11/21: 49 kg (108 lb).        She reports that she has never smoked. She has never used smokeless tobacco.      Counseling Resources:  ATP IV Guidelines  Pooled Cohorts Equation Calculator  Breast Cancer Risk Calculator  BRCA-Related Cancer Risk Assessment: FHS-7 Tool  FRAX Risk Assessment  ICSI Preventive Guidelines  Dietary Guidelines for Americans, 2010  USDA's MyPlate  ASA Prophylaxis  Lung CA Screening    Savanah Alejandre, ANAYELI SRIVASTAVA  [facility]  "

## 2022-10-20 ENCOUNTER — OFFICE VISIT (OUTPATIENT)
Dept: FAMILY MEDICINE | Facility: CLINIC | Age: 57
End: 2022-10-20
Payer: OTHER GOVERNMENT

## 2022-10-20 VITALS
SYSTOLIC BLOOD PRESSURE: 120 MMHG | WEIGHT: 109 LBS | DIASTOLIC BLOOD PRESSURE: 70 MMHG | OXYGEN SATURATION: 100 % | HEART RATE: 65 BPM | HEIGHT: 57 IN | RESPIRATION RATE: 15 BRPM | BODY MASS INDEX: 23.51 KG/M2 | TEMPERATURE: 97.4 F

## 2022-10-20 DIAGNOSIS — F33.0 MILD EPISODE OF RECURRENT MAJOR DEPRESSIVE DISORDER (H): ICD-10-CM

## 2022-10-20 DIAGNOSIS — R56.9 SEIZURES (H): ICD-10-CM

## 2022-10-20 DIAGNOSIS — Z23 NEED FOR PROPHYLACTIC VACCINATION AND INOCULATION AGAINST INFLUENZA: ICD-10-CM

## 2022-10-20 DIAGNOSIS — Z78.0 POST-MENOPAUSAL: Primary | ICD-10-CM

## 2022-10-20 DIAGNOSIS — F41.1 GAD (GENERALIZED ANXIETY DISORDER): ICD-10-CM

## 2022-10-20 DIAGNOSIS — Z23 HIGH PRIORITY FOR 2019-NCOV VACCINE: ICD-10-CM

## 2022-10-20 DIAGNOSIS — M25.50 MULTIPLE JOINT PAIN: ICD-10-CM

## 2022-10-20 PROCEDURE — 90682 RIV4 VACC RECOMBINANT DNA IM: CPT | Performed by: NURSE PRACTITIONER

## 2022-10-20 PROCEDURE — 91312 COVID-19,PF,PFIZER BOOSTER BIVALENT: CPT | Performed by: NURSE PRACTITIONER

## 2022-10-20 PROCEDURE — 99214 OFFICE O/P EST MOD 30 MIN: CPT | Mod: 25 | Performed by: NURSE PRACTITIONER

## 2022-10-20 PROCEDURE — 0124A COVID-19,PF,PFIZER BOOSTER BIVALENT: CPT | Performed by: NURSE PRACTITIONER

## 2022-10-20 PROCEDURE — 90471 IMMUNIZATION ADMIN: CPT | Performed by: NURSE PRACTITIONER

## 2022-10-20 NOTE — PROGRESS NOTES
Assessment & Plan     Multiple joint pain  Suspect OA which correlates to x-ray studies.  We discussed lower impact activities.  She is unable to tolerate NSAIDs due to gi disturbance.  Suggested liquid might be more tolerable.  We can also try voltaren gel.  At this time, not interested in referral for injection.  She has previously done PT and feels the exercise sheets she has on hand are sufficient.      - diclofenac (VOLTAREN) 1 % topical gel; Apply 4 g topically 4 times daily Apply to hand and bilateral knees    Post-menopausal  - DX Hip/Pelvis/Spine; Future    AMBROSE (generalized anxiety disorder)  Had tried to decrease to 25mg, feeling more irritable.  Will increase back to 50mg which she previously tolerated.   - sertraline (ZOLOFT) 50 MG tablet; Take 1 tablet (50 mg) by mouth daily    Mild episode of recurrent major depressive disorder (H)  See above    Seizures (H)  No new seizure activity.    Need for prophylactic vaccination and inoculation against influenza  - INFLUENZA QUAD, RECOMBINANT, P-FREE (RIV4) (FLUBLOK)    High priority for 2019-nCoV vaccine  - COVID-19,PF,PFIZER BOOSTER BIVALENT 12+Yrs        No follow-ups on file.    ANAYELI Calderon CNP  [facility]    Sailaja Davidson is a 57 year old, presenting for the following health issues:  joint issues       History of Present Illness       Reason for visit:  Joint pain - various joints, fatigue  Symptom onset:  More than a month  Symptoms include:  Some are new and some I've had for years - joint pain, fatigue, neck and back pain, all comes and goes  Symptom intensity:  Moderate  Symptom progression:  Staying the same  Had these symptoms before:  Yes  Has tried/received treatment for these symptoms:  Yes  Previous treatment was successful:  No  What makes it worse:  Inactivity, sitting but sometimes too much activity makes it worse  What makes it better:  Moving, being active    She eats 4 or more servings of fruits  and vegetables daily.She consumes 0 sweetened beverage(s) daily.She exercises with enough effort to increase her heart rate 30 to 60 minutes per day.  She exercises with enough effort to increase her heart rate 7 days per week.   She is taking medications regularly.       Paternal grandmother had severe psoriatic arthritis.  Since in 40's has had raynaud's.  Also history of hypothyroid.  Having more joint issues over the last year.      Back issue since 2017, had ablation.    Knee issues for a while.  This past spring, got really bad when started running again.  Went to PT, helped somewhat.  When they were really bad, had swelling.  Started at 3 miles, was training for distance.      Right hand bothersome, neck bothersome.  Symptoms have not been as bad lately.      Review of Systems   Constitutional, HEENT, cardiovascular, pulmonary, gi and gu systems are negative, except as otherwise noted.      Objective    LMP 04/06/2018 (Approximate)   There is no height or weight on file to calculate BMI.  Physical Exam   GENERAL: healthy, alert and no distress  NECK: no adenopathy, no asymmetry, masses, or scars and thyroid normal to palpation  ABDOMEN: soft, nontender, no hepatosplenomegaly, no masses and bowel sounds normal  MS: no gross musculoskeletal defects noted, no edema  SKIN: no suspicious lesions or rashes

## 2023-01-19 ENCOUNTER — MYC MEDICAL ADVICE (OUTPATIENT)
Dept: ORTHOPEDICS | Facility: CLINIC | Age: 58
End: 2023-01-19
Payer: OTHER GOVERNMENT

## 2023-01-20 NOTE — TELEPHONE ENCOUNTER
LVM stating:    It's typically insurance, usually start with PT, Cortisone shot and then move onto hyaluronic acid injections   These shots can be $5000 a piece and the brand may be driven by insurance.    Patient was informed she can come into the office to discuss further if she would like.

## 2023-04-25 ENCOUNTER — LAB (OUTPATIENT)
Dept: FAMILY MEDICINE | Facility: CLINIC | Age: 58
End: 2023-04-25

## 2023-04-25 ENCOUNTER — VIRTUAL VISIT (OUTPATIENT)
Dept: FAMILY MEDICINE | Facility: CLINIC | Age: 58
End: 2023-04-25
Payer: OTHER GOVERNMENT

## 2023-04-25 DIAGNOSIS — Z12.11 SCREEN FOR COLON CANCER: ICD-10-CM

## 2023-04-25 DIAGNOSIS — Z78.0 POST-MENOPAUSAL: Primary | ICD-10-CM

## 2023-04-25 DIAGNOSIS — M25.50 MULTIPLE JOINT PAIN: ICD-10-CM

## 2023-04-25 PROCEDURE — 99214 OFFICE O/P EST MOD 30 MIN: CPT | Mod: VID | Performed by: NURSE PRACTITIONER

## 2023-04-25 RX ORDER — ESTRADIOL 1 MG/1
1 TABLET ORAL DAILY
Qty: 90 TABLET | Refills: 1 | Status: SHIPPED | OUTPATIENT
Start: 2023-04-25 | End: 2023-10-17

## 2023-04-25 RX ORDER — PROGESTERONE 100 MG/1
100 CAPSULE ORAL DAILY
Qty: 90 CAPSULE | Refills: 1 | Status: SHIPPED | OUTPATIENT
Start: 2023-04-25 | End: 2023-10-17

## 2023-04-25 ASSESSMENT — PATIENT HEALTH QUESTIONNAIRE - PHQ9
SUM OF ALL RESPONSES TO PHQ QUESTIONS 1-9: 2
SUM OF ALL RESPONSES TO PHQ QUESTIONS 1-9: 2
10. IF YOU CHECKED OFF ANY PROBLEMS, HOW DIFFICULT HAVE THESE PROBLEMS MADE IT FOR YOU TO DO YOUR WORK, TAKE CARE OF THINGS AT HOME, OR GET ALONG WITH OTHER PEOPLE: NOT DIFFICULT AT ALL

## 2023-04-25 NOTE — PROGRESS NOTES
Lety is a 58 year old who is being evaluated via a billable video visit.      How would you like to obtain your AVS? MyChart  If the video visit is dropped, the invitation should be resent by: Send to e-mail at: [email or web address]  Will anyone else be joining your video visit? No          Assessment & Plan     Post-menopausal  Multiple joint pain  Certainly possible her joint pain is secondary to post-menopause.  Symptom onset correlates.  We discussed the risks and benefits of hormone replacement therapy and the Women's Health Initiative.  Including increased risk of stroke, heart attack, and coronary artery disease in patients with a personal history significant for chronic hypertension, hypercholesterol, or strong family history of coronary artery disease.  We discussed current reccomendations for prescribing estrogen for symptomatic relief of hot flashes on a temporary basis during the chasidy-menopausal years. We discussed a slightly increased risk of breast cancer.  We also discussed beneficial effects of osteoporosis prevention and a decreased incidence of colon cancer.   We discussed if joint pain does not improve, we will want to discontinue therapy and consider vaginal estrogen instead for atrophic vaginitis.  We will follow up in a few months.    - estradiol (ESTRACE) 1 MG tablet; Take 1 tablet (1 mg) by mouth daily  - progesterone (PROMETRIUM) 100 MG capsule; Take 1 capsule (100 mg) by mouth daily    Screen for colon cancer  - COLOGUARD(EXACT SCIENCES); Future    Prescription drug management      ANAYELI Calderon [facility]   Lety is a 58 year old, presenting for the following health issues:  hormone therpy        4/25/2023     2:51 PM   Additional Questions   Roomed by Tong Bryson   Accompanied by Self     History of Present Illness       Back Pain:  She presents for follow up of back pain. Patient's back pain is a chronic problem.  Location of back pain:  " Right lower back, left lower back, right side of neck and left side of neck  Description of back pain: dull ache  Back pain spreads: right buttocks, left buttocks, right thigh and left thigh    Since patient first noticed back pain, pain is: always present, but gets better and worse  Does back pain interfere with her job:  No      Reason for visit:  I think I would like to try HRT    She eats 4 or more servings of fruits and vegetables daily.She consumes 0 sweetened beverage(s) daily.She exercises with enough effort to increase her heart rate 30 to 60 minutes per day.  She exercises with enough effort to increase her heart rate 6 days per week.   She is taking medications regularly.    Today's PHQ-9         PHQ-9 Total Score: 2    PHQ-9 Q9 Thoughts of better off dead/self-harm past 2 weeks :   Not at all    How difficult have these problems made it for you to do your work, take care of things at home, or get along with other people: Not difficult at all       Difficulty sleeping, multi joint pain, hot flashes aren't terrible.  Had previous rheumatology work up which was negative.  Did some research and has a friend whose joint pain improved with HRT.      Mammo scheduled Monday, due for Cologaurd.        Review of Systems   Constitutional, HEENT, cardiovascular, pulmonary, gi and gu systems are negative, except as otherwise noted.      Objective    Vitals - Patient Reported  Weight (Patient Reported): 49.9 kg (110 lb)  Height (Patient Reported): 144.8 cm (4' 9\")  BMI (Based on Pt Reported Ht/Wt): 23.8  Pain Score: No Pain (0)        Physical Exam   GENERAL: Healthy, alert and no distress  EYES: Eyes grossly normal to inspection.  No discharge or erythema, or obvious scleral/conjunctival abnormalities.  RESP: No audible wheeze, cough, or visible cyanosis.  No visible retractions or increased work of breathing.    SKIN: Visible skin clear. No significant rash, abnormal pigmentation or lesions.  NEURO: Cranial nerves " grossly intact.  Mentation and speech appropriate for age.  PSYCH: Mentation appears normal, affect normal/bright, judgement and insight intact, normal speech and appearance well-groomed.                Video-Visit Details    Type of service:  Video Visit     Originating Location (pt. Location): Home    Distant Location (provider location):  Off-site  Platform used for Video Visit: Nayla

## 2023-05-01 ENCOUNTER — ANCILLARY PROCEDURE (OUTPATIENT)
Dept: MAMMOGRAPHY | Facility: CLINIC | Age: 58
End: 2023-05-01
Attending: NURSE PRACTITIONER
Payer: OTHER GOVERNMENT

## 2023-05-01 DIAGNOSIS — Z12.31 VISIT FOR SCREENING MAMMOGRAM: ICD-10-CM

## 2023-05-01 PROCEDURE — 77067 SCR MAMMO BI INCL CAD: CPT

## 2023-05-01 PROCEDURE — 77067 SCR MAMMO BI INCL CAD: CPT | Mod: 26

## 2023-08-01 ENCOUNTER — PATIENT OUTREACH (OUTPATIENT)
Dept: CARE COORDINATION | Facility: CLINIC | Age: 58
End: 2023-08-01
Payer: OTHER GOVERNMENT

## 2023-08-15 ENCOUNTER — PATIENT OUTREACH (OUTPATIENT)
Dept: CARE COORDINATION | Facility: CLINIC | Age: 58
End: 2023-08-15
Payer: OTHER GOVERNMENT

## 2023-09-15 ENCOUNTER — MYC MEDICAL ADVICE (OUTPATIENT)
Dept: FAMILY MEDICINE | Facility: CLINIC | Age: 58
End: 2023-09-15
Payer: OTHER GOVERNMENT

## 2023-09-15 DIAGNOSIS — E06.3 HYPOTHYROIDISM DUE TO HASHIMOTO'S THYROIDITIS: ICD-10-CM

## 2023-09-15 DIAGNOSIS — Z00.00 ROUTINE GENERAL MEDICAL EXAMINATION AT A HEALTH CARE FACILITY: ICD-10-CM

## 2023-09-15 RX ORDER — LEVOTHYROXINE SODIUM 112 UG/1
112 TABLET ORAL DAILY
Qty: 90 TABLET | Refills: 0 | Status: SHIPPED | OUTPATIENT
Start: 2023-09-15 | End: 2023-12-12

## 2023-10-26 ENCOUNTER — OFFICE VISIT (OUTPATIENT)
Dept: FAMILY MEDICINE | Facility: CLINIC | Age: 58
End: 2023-10-26
Payer: OTHER GOVERNMENT

## 2023-10-26 ENCOUNTER — LAB (OUTPATIENT)
Dept: FAMILY MEDICINE | Facility: CLINIC | Age: 58
End: 2023-10-26

## 2023-10-26 VITALS
SYSTOLIC BLOOD PRESSURE: 125 MMHG | WEIGHT: 107 LBS | OXYGEN SATURATION: 100 % | TEMPERATURE: 97.6 F | BODY MASS INDEX: 23.08 KG/M2 | DIASTOLIC BLOOD PRESSURE: 80 MMHG | RESPIRATION RATE: 15 BRPM | HEIGHT: 57 IN | HEART RATE: 55 BPM

## 2023-10-26 DIAGNOSIS — Z13.1 SCREENING FOR DIABETES MELLITUS: ICD-10-CM

## 2023-10-26 DIAGNOSIS — Z12.11 SCREEN FOR COLON CANCER: ICD-10-CM

## 2023-10-26 DIAGNOSIS — Z00.00 ROUTINE GENERAL MEDICAL EXAMINATION AT A HEALTH CARE FACILITY: Primary | ICD-10-CM

## 2023-10-26 DIAGNOSIS — R56.9 SEIZURES (H): ICD-10-CM

## 2023-10-26 DIAGNOSIS — Z13.220 LIPID SCREENING: ICD-10-CM

## 2023-10-26 DIAGNOSIS — L72.11 PILAR CYST OF SCALP: ICD-10-CM

## 2023-10-26 DIAGNOSIS — Z13.0 SCREENING FOR IRON DEFICIENCY ANEMIA: ICD-10-CM

## 2023-10-26 DIAGNOSIS — K90.41 NON-CELIAC GLUTEN SENSITIVITY: ICD-10-CM

## 2023-10-26 DIAGNOSIS — E06.3 HYPOTHYROIDISM DUE TO HASHIMOTO'S THYROIDITIS: ICD-10-CM

## 2023-10-26 DIAGNOSIS — F33.0 MILD EPISODE OF RECURRENT MAJOR DEPRESSIVE DISORDER (H): ICD-10-CM

## 2023-10-26 LAB
ALBUMIN SERPL BCG-MCNC: 4.4 G/DL (ref 3.5–5.2)
ALP SERPL-CCNC: 65 U/L (ref 35–104)
ALT SERPL W P-5'-P-CCNC: 16 U/L (ref 0–50)
ANION GAP SERPL CALCULATED.3IONS-SCNC: 9 MMOL/L (ref 7–15)
AST SERPL W P-5'-P-CCNC: 28 U/L (ref 0–45)
BILIRUB SERPL-MCNC: 0.4 MG/DL
BUN SERPL-MCNC: 12.9 MG/DL (ref 6–20)
CALCIUM SERPL-MCNC: 9.2 MG/DL (ref 8.6–10)
CHLORIDE SERPL-SCNC: 100 MMOL/L (ref 98–107)
CHOLEST SERPL-MCNC: 208 MG/DL
CREAT SERPL-MCNC: 0.64 MG/DL (ref 0.51–0.95)
DEPRECATED HCO3 PLAS-SCNC: 23 MMOL/L (ref 22–29)
EGFRCR SERPLBLD CKD-EPI 2021: >90 ML/MIN/1.73M2
ERYTHROCYTE [DISTWIDTH] IN BLOOD BY AUTOMATED COUNT: 12.6 % (ref 10–15)
GLUCOSE SERPL-MCNC: 87 MG/DL (ref 70–99)
HBV SURFACE AB SERPL IA-ACNC: 0.54 M[IU]/ML
HBV SURFACE AB SERPL IA-ACNC: NONREACTIVE M[IU]/ML
HCT VFR BLD AUTO: 40.1 % (ref 35–47)
HDLC SERPL-MCNC: 60 MG/DL
HGB BLD-MCNC: 13.3 G/DL (ref 11.7–15.7)
LDLC SERPL CALC-MCNC: 131 MG/DL
MCH RBC QN AUTO: 29.6 PG (ref 26.5–33)
MCHC RBC AUTO-ENTMCNC: 33.2 G/DL (ref 31.5–36.5)
MCV RBC AUTO: 89 FL (ref 78–100)
NONHDLC SERPL-MCNC: 148 MG/DL
PLATELET # BLD AUTO: 339 10E3/UL (ref 150–450)
POTASSIUM SERPL-SCNC: 4.4 MMOL/L (ref 3.4–5.3)
PROT SERPL-MCNC: 7.7 G/DL (ref 6.4–8.3)
RBC # BLD AUTO: 4.49 10E6/UL (ref 3.8–5.2)
SODIUM SERPL-SCNC: 132 MMOL/L (ref 135–145)
TRIGL SERPL-MCNC: 86 MG/DL
TSH SERPL DL<=0.005 MIU/L-ACNC: 1.71 UIU/ML (ref 0.3–4.2)
WBC # BLD AUTO: 9.1 10E3/UL (ref 4–11)

## 2023-10-26 PROCEDURE — 85027 COMPLETE CBC AUTOMATED: CPT | Performed by: NURSE PRACTITIONER

## 2023-10-26 PROCEDURE — 91320 SARSCV2 VAC 30MCG TRS-SUC IM: CPT | Performed by: NURSE PRACTITIONER

## 2023-10-26 PROCEDURE — 99396 PREV VISIT EST AGE 40-64: CPT | Mod: 25 | Performed by: NURSE PRACTITIONER

## 2023-10-26 PROCEDURE — 86364 TISS TRNSGLTMNASE EA IG CLAS: CPT | Performed by: NURSE PRACTITIONER

## 2023-10-26 PROCEDURE — 86706 HEP B SURFACE ANTIBODY: CPT | Performed by: NURSE PRACTITIONER

## 2023-10-26 PROCEDURE — 90480 ADMN SARSCOV2 VAC 1/ONLY CMP: CPT | Performed by: NURSE PRACTITIONER

## 2023-10-26 PROCEDURE — 80061 LIPID PANEL: CPT | Performed by: NURSE PRACTITIONER

## 2023-10-26 PROCEDURE — 36415 COLL VENOUS BLD VENIPUNCTURE: CPT | Performed by: NURSE PRACTITIONER

## 2023-10-26 PROCEDURE — 84443 ASSAY THYROID STIM HORMONE: CPT | Performed by: NURSE PRACTITIONER

## 2023-10-26 PROCEDURE — 99214 OFFICE O/P EST MOD 30 MIN: CPT | Mod: 25 | Performed by: NURSE PRACTITIONER

## 2023-10-26 PROCEDURE — 80053 COMPREHEN METABOLIC PANEL: CPT | Performed by: NURSE PRACTITIONER

## 2023-10-26 ASSESSMENT — ENCOUNTER SYMPTOMS
FEVER: 0
DIZZINESS: 0
BREAST MASS: 0
MYALGIAS: 0
CONSTIPATION: 0
HEADACHES: 0
SHORTNESS OF BREATH: 0
HEARTBURN: 0
HEMATURIA: 0
SORE THROAT: 0
JOINT SWELLING: 0
NERVOUS/ANXIOUS: 0
FREQUENCY: 0
COUGH: 0
NAUSEA: 0
DIARRHEA: 0
PARESTHESIAS: 0
PALPITATIONS: 0
ABDOMINAL PAIN: 0
CHILLS: 0
ARTHRALGIAS: 0
EYE PAIN: 0
DYSURIA: 0
WEAKNESS: 0
HEMATOCHEZIA: 0

## 2023-10-26 ASSESSMENT — PATIENT HEALTH QUESTIONNAIRE - PHQ9
SUM OF ALL RESPONSES TO PHQ QUESTIONS 1-9: 1
10. IF YOU CHECKED OFF ANY PROBLEMS, HOW DIFFICULT HAVE THESE PROBLEMS MADE IT FOR YOU TO DO YOUR WORK, TAKE CARE OF THINGS AT HOME, OR GET ALONG WITH OTHER PEOPLE: NOT DIFFICULT AT ALL
SUM OF ALL RESPONSES TO PHQ QUESTIONS 1-9: 1

## 2023-10-26 ASSESSMENT — PAIN SCALES - GENERAL: PAINLEVEL: NO PAIN (0)

## 2023-10-26 NOTE — PROGRESS NOTES
SUBJECTIVE:   CC: Lety is an 58 year old who presents for preventive health visit.       10/26/2023     7:12 AM   Additional Questions   Roomed by Maryjo MORALES       Healthy Habits:     Getting at least 3 servings of Calcium per day:  Yes    Bi-annual eye exam:  Yes    Dental care twice a year:  Yes    Sleep apnea or symptoms of sleep apnea:  None    Diet:  Regular (no restrictions)    Frequency of exercise:  6-7 days/week    Duration of exercise:  45-60 minutes    Taking medications regularly:  Yes    Medication side effects:  None    Additional concerns today:  Yes      Today's PHQ-9 Score:       10/26/2023     5:23 AM   PHQ-9 SCORE   PHQ-9 Total Score MyChart 1 (Minimal depression)   PHQ-9 Total Score 1     PROBLEMS TO ADD ON...  Would like to discuss removal or pilar cyst with derm  Continues to have issues when eating bread.  Previously following gluten free diet but would also like to pursue celiac testing  Feeling much more energetic, joint pain improved since starting HRT.        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 Not Applicable          No data to display              Reviewed orders with patient.  Reviewed health maintenance and updated orders accordingly - Yes  Lab work is in process    Breast Cancer Screenin/20/2021     3:38 PM 2022     7:15 AM   Breast CA Risk Assessment (FHS-7)   Do you have a family history of breast, colon, or ovarian cancer? Yes No / Unknown         Mammogram Screening: Recommended mammography every 1-2 years with patient discussion and risk factor consideration  Pertinent mammograms are reviewed under the imaging tab.    History of abnormal Pap smear: NO - age 30-65 PAP every 5 years with negative HPV co-testing recommended       "Latest Ref Rng & Units 6/23/2021     7:25 AM 6/23/2021     7:16 AM 6/6/2018     8:30 AM   PAP / HPV   PAP (Historical)   NIL     HPV 16 DNA NEG^Negative Negative   Negative    HPV 18 DNA NEG^Negative Negative   Negative    Other HR HPV NEG^Negative Negative   Negative      Reviewed and updated as needed this visit by clinical staff   Tobacco  Allergies  Meds              Reviewed and updated as needed this visit by Provider                     Review of Systems   Constitutional:  Negative for chills and fever.   HENT:  Negative for congestion, ear pain, hearing loss and sore throat.    Eyes:  Negative for pain and visual disturbance.   Respiratory:  Negative for cough and shortness of breath.    Cardiovascular:  Negative for chest pain, palpitations and peripheral edema.   Gastrointestinal:  Negative for abdominal pain, constipation, diarrhea, heartburn, hematochezia and nausea.   Breasts:  Negative for tenderness, breast mass and discharge.   Genitourinary:  Negative for dysuria, frequency, genital sores, hematuria, pelvic pain, urgency, vaginal bleeding and vaginal discharge.   Musculoskeletal:  Negative for arthralgias, joint swelling and myalgias.   Skin:  Negative for rash.   Neurological:  Negative for dizziness, weakness, headaches and paresthesias.   Psychiatric/Behavioral:  Negative for mood changes. The patient is not nervous/anxious.           OBJECTIVE:   /80 (BP Location: Right arm, Patient Position: Sitting, Cuff Size: Adult Regular)   Pulse 55   Temp 97.6  F (36.4  C) (Temporal)   Resp 15   Ht 1.448 m (4' 9\")   Wt 48.5 kg (107 lb)   LMP 04/06/2018 (Approximate)   SpO2 100%   BMI 23.15 kg/m    Physical Exam  GENERAL APPEARANCE: healthy, alert and no distress  EYES: Eyes grossly normal to inspection, PERRL and conjunctivae and sclerae normal  HENT: ear canals and TM's normal, nose and mouth without ulcers or lesions, oropharynx clear and oral mucous membranes moist  NECK: no adenopathy, " no asymmetry, masses, or scars and thyroid normal to palpation  RESP: lungs clear to auscultation - no rales, rhonchi or wheezes  BREAST: normal without masses, tenderness or nipple discharge and no palpable axillary masses or adenopathy  CV: regular rate and rhythm, normal S1 S2, no S3 or S4, no murmur, click or rub, no peripheral edema and peripheral pulses strong  ABDOMEN: soft, nontender, no hepatosplenomegaly, no masses and bowel sounds normal  MS: no musculoskeletal defects are noted and gait is age appropriate without ataxia  SKIN: no suspicious lesions or rashes.  Pilar cyst on occipital scalp  NEURO: Normal strength and tone, sensory exam grossly normal, mentation intact and speech normal  PSYCH: mentation appears normal and affect normal/bright        ASSESSMENT/PLAN:   (Z00.00) Routine general medical examination at a health care facility  (primary encounter diagnosis)  Comment: Reviewed medical/social/family history and health maintenance   Plan: Hepatitis B Surface Antibody            (L72.11) Pilar cyst of scalp  Comment: Derm referral due to location of the cyst  Plan: Adult Dermatology  Referral            (K90.41) Non-celiac gluten sensitivity  Comment: We discussed gluten sensitivity doesn't always indicate celiac.  Will do testing as requested as she has been eating gluten lately.  Advised gluten avoidance which may also help her joint pain.    Plan: Tissue transglutaminase aurora IgA and IgG            (F33.0) Mild episode of recurrent major depressive disorder (H24)  Comment:  Stable, tolerating med, no changes at this time  Plan:     (R56.9) Seizures (H)  Comment: history of, no new sz activity.  Not currently on medication.  Plan:     (E03.8,  E06.3) Hypothyroidism due to Hashimoto's thyroiditis  Comment:  Stable, tolerating med, no changes at this time  Plan: TSH WITH FREE T4 REFLEX            (Z12.11) Screen for colon cancer  Comment:   Plan: [facility] [employer])             (Z13.220) Lipid screening  Comment:   Plan: Lipid panel reflex to direct LDL Fasting            (Z13.0) Screening for iron deficiency anemia  Comment:   Plan: CBC with platelets            (Z13.1) Screening for diabetes mellitus  Comment:   Plan: Comprehensive metabolic panel (BMP + Alb, Alk         Phos, ALT, AST, Total. Bili, TP)            Patient has been advised of split billing requirements and indicates understanding: Yes      COUNSELING:  Reviewed preventive health counseling, as reflected in patient instructions        She reports that she has never smoked. She has never used smokeless tobacco.          ANAYELI Calderon [facility]  Answers submitted by the patient for this visit:  Patient Health Questionnaire (Submitted on 10/26/2023)  If you checked off any problems, how difficult have these problems made it for you to do your work, take care of things at home, or get along with other people?: Not difficult at all  PHQ9 TOTAL SCORE: 1

## 2023-10-27 LAB
TTG IGA SER-ACNC: 0.7 U/ML
TTG IGG SER-ACNC: 0.7 U/ML

## 2023-10-31 DIAGNOSIS — F41.1 GAD (GENERALIZED ANXIETY DISORDER): ICD-10-CM

## 2023-12-12 DIAGNOSIS — E06.3 HYPOTHYROIDISM DUE TO HASHIMOTO'S THYROIDITIS: ICD-10-CM

## 2023-12-12 DIAGNOSIS — Z00.00 ROUTINE GENERAL MEDICAL EXAMINATION AT A HEALTH CARE FACILITY: ICD-10-CM

## 2023-12-12 RX ORDER — LEVOTHYROXINE SODIUM 112 UG/1
112 TABLET ORAL DAILY
Qty: 90 TABLET | Refills: 2 | Status: SHIPPED | OUTPATIENT
Start: 2023-12-12 | End: 2024-09-08

## 2024-01-12 ENCOUNTER — MYC REFILL (OUTPATIENT)
Dept: FAMILY MEDICINE | Facility: CLINIC | Age: 59
End: 2024-01-12
Payer: OTHER GOVERNMENT

## 2024-01-12 DIAGNOSIS — F41.1 GAD (GENERALIZED ANXIETY DISORDER): ICD-10-CM

## 2024-01-12 RX ORDER — BUSPIRONE HYDROCHLORIDE 5 MG/1
5 TABLET ORAL 2 TIMES DAILY
Qty: 180 TABLET | Refills: 3 | Status: SHIPPED | OUTPATIENT
Start: 2024-01-12

## 2024-03-04 ENCOUNTER — TRANSFERRED RECORDS (OUTPATIENT)
Dept: HEALTH INFORMATION MANAGEMENT | Facility: CLINIC | Age: 59
End: 2024-03-04
Payer: OTHER GOVERNMENT

## 2024-03-08 ENCOUNTER — MYC MEDICAL ADVICE (OUTPATIENT)
Dept: FAMILY MEDICINE | Facility: CLINIC | Age: 59
End: 2024-03-08
Payer: OTHER GOVERNMENT

## 2024-03-08 DIAGNOSIS — M25.50 MULTIPLE JOINT PAIN: Primary | ICD-10-CM

## 2024-03-08 NOTE — TELEPHONE ENCOUNTER
Savanah: pended referral     I know we have previously discussed a referral to a rheumatologist but decided against it but I am having new issues with my hands and other joint pain. If you are willing, I think I would like a chance to see a rheumatologist to discuss possible causes. thanks!     Hoa RODRIGUES RN  [facility]

## 2024-04-14 DIAGNOSIS — Z78.0 POST-MENOPAUSAL: ICD-10-CM

## 2024-04-14 DIAGNOSIS — M25.50 MULTIPLE JOINT PAIN: ICD-10-CM

## 2024-04-15 RX ORDER — ESTRADIOL 1 MG/1
1 TABLET ORAL DAILY
Qty: 90 TABLET | Refills: 1 | Status: SHIPPED | OUTPATIENT
Start: 2024-04-15

## 2024-04-16 DIAGNOSIS — Z78.0 POST-MENOPAUSAL: ICD-10-CM

## 2024-04-16 DIAGNOSIS — M25.50 MULTIPLE JOINT PAIN: ICD-10-CM

## 2024-04-16 RX ORDER — PROGESTERONE 100 MG/1
100 CAPSULE ORAL DAILY
Qty: 90 CAPSULE | Refills: 1 | Status: SHIPPED | OUTPATIENT
Start: 2024-04-16

## 2024-08-08 ENCOUNTER — OFFICE VISIT (OUTPATIENT)
Dept: RHEUMATOLOGY | Facility: CLINIC | Age: 59
End: 2024-08-08
Payer: OTHER GOVERNMENT

## 2024-08-08 ENCOUNTER — LAB (OUTPATIENT)
Dept: LAB | Facility: CLINIC | Age: 59
End: 2024-08-08
Payer: OTHER GOVERNMENT

## 2024-08-08 VITALS
WEIGHT: 106.7 LBS | SYSTOLIC BLOOD PRESSURE: 127 MMHG | DIASTOLIC BLOOD PRESSURE: 74 MMHG | OXYGEN SATURATION: 99 % | BODY MASS INDEX: 23.09 KG/M2 | HEART RATE: 57 BPM

## 2024-08-08 DIAGNOSIS — I73.00 RAYNAUD'S DISEASE WITHOUT GANGRENE: ICD-10-CM

## 2024-08-08 DIAGNOSIS — M25.50 MULTIPLE JOINT PAIN: Primary | ICD-10-CM

## 2024-08-08 DIAGNOSIS — M25.50 MULTIPLE JOINT PAIN: ICD-10-CM

## 2024-08-08 DIAGNOSIS — R76.8 POSITIVE ANA (ANTINUCLEAR ANTIBODY): ICD-10-CM

## 2024-08-08 LAB
ALBUMIN UR-MCNC: NEGATIVE MG/DL
APPEARANCE UR: CLEAR
BACTERIA #/AREA URNS HPF: ABNORMAL /HPF
BILIRUB UR QL STRIP: NEGATIVE
COLOR UR AUTO: YELLOW
CRP SERPL-MCNC: <3 MG/L
ERYTHROCYTE [SEDIMENTATION RATE] IN BLOOD BY WESTERGREN METHOD: 15 MM/HR (ref 0–30)
GLUCOSE UR STRIP-MCNC: NEGATIVE MG/DL
HGB UR QL STRIP: NEGATIVE
KETONES UR STRIP-MCNC: NEGATIVE MG/DL
LEUKOCYTE ESTERASE UR QL STRIP: NEGATIVE
NITRATE UR QL: NEGATIVE
PH UR STRIP: 7 [PH] (ref 5–8)
RBC #/AREA URNS AUTO: ABNORMAL /HPF
RHEUMATOID FACT SERPL-ACNC: <10 IU/ML
SP GR UR STRIP: 1.01 (ref 1–1.03)
SQUAMOUS #/AREA URNS AUTO: ABNORMAL /LPF
UROBILINOGEN UR STRIP-ACNC: 0.2 E.U./DL
WBC #/AREA URNS AUTO: ABNORMAL /HPF

## 2024-08-08 PROCEDURE — 85730 THROMBOPLASTIN TIME PARTIAL: CPT | Performed by: PHYSICIAN ASSISTANT

## 2024-08-08 PROCEDURE — 86160 COMPLEMENT ANTIGEN: CPT

## 2024-08-08 PROCEDURE — 86200 CCP ANTIBODY: CPT

## 2024-08-08 PROCEDURE — 86431 RHEUMATOID FACTOR QUANT: CPT

## 2024-08-08 PROCEDURE — 36415 COLL VENOUS BLD VENIPUNCTURE: CPT | Performed by: PHYSICIAN ASSISTANT

## 2024-08-08 PROCEDURE — 86146 BETA-2 GLYCOPROTEIN ANTIBODY: CPT | Performed by: PHYSICIAN ASSISTANT

## 2024-08-08 PROCEDURE — 86147 CARDIOLIPIN ANTIBODY EA IG: CPT | Performed by: PHYSICIAN ASSISTANT

## 2024-08-08 PROCEDURE — 85613 RUSSELL VIPER VENOM DILUTED: CPT | Performed by: PHYSICIAN ASSISTANT

## 2024-08-08 PROCEDURE — 86235 NUCLEAR ANTIGEN ANTIBODY: CPT

## 2024-08-08 PROCEDURE — 85652 RBC SED RATE AUTOMATED: CPT

## 2024-08-08 PROCEDURE — 85390 FIBRINOLYSINS SCREEN I&R: CPT | Performed by: PATHOLOGY

## 2024-08-08 PROCEDURE — 86225 DNA ANTIBODY NATIVE: CPT

## 2024-08-08 PROCEDURE — 81001 URINALYSIS AUTO W/SCOPE: CPT

## 2024-08-08 PROCEDURE — 86140 C-REACTIVE PROTEIN: CPT

## 2024-08-08 PROCEDURE — 99204 OFFICE O/P NEW MOD 45 MIN: CPT | Performed by: PHYSICIAN ASSISTANT

## 2024-08-08 NOTE — PROGRESS NOTES
Rheumatology Clinic Visit  [facility]  MARTHA Hobson     Lety Alcantara MRN# [id number]   YOB: 1965 Age: 59 year old   Date of Visit: 08/08/2024  Primary care provider: Savanah Meléndez          Assessment and Plan:     1.  Multiple joint pain  2.  Positive LEONARDO  3.  Raynaud's    Patient presents today for an initial evaluation.  She has a longstanding history of pain particularly in her knees.  She states that she will have episodes with the pain is more severe and she will get swelling in her knees.  These episodes will resolve and then they will come back.  She is also started to notice some pain in her toes, hands and low back.  These areas are not as significant as the knee pain.  Grandmother has a history of psoriasis and psoriatic arthritis.  Father has a history of psoriasis.  She has a history of red scaly plaques on her elbows when she was much younger, nothing recent.  Physical examination today did not show any active synovitis, dactylitis, tenosynovitis or enthesopathy.  She is full fist formation and normal  strength.  Normal joint range of motion.  No tenderness over her SI joints.  No knee effusions.  She did have color changes to the tips of her toes during the exam consistent with Raynaud's.  Previous laboratory evaluations and imaging studies were reviewed, results below.    Reviewed the specialty of rheumatology today.  Discussed with the patient that a positive LEONARDO is of unknown significance.  We discussed that the majority of positive ANAs are false positives.  10 to 30% of the healthy population can also have a positive LEONARDO that is not associated with any disorder.  Thyroiditis and hepatitis have also been known to be associated with a positive LEONARDO.  We also discussed that viral and bacterial infections could also have a positive LEONARDO.  Discussed that 99% of people who have systemic lupus erythematous have a positive LEONARDO but the majority of people that have a  positive LEONARDO do not have lupus.  An LEONARDO can also be seen with other rheumatological autoimmune disorders such as scleroderma.  Reassured the patient that my suspicion for connective tissue disorder is remote, however given the symptoms she has been having plus the positive LEONARDO would recommend checking the LEONARDO subsets.  Will also check the rheumatoid arthritis antibodies.  Would recommend getting an MRI of her left knee to further evaluate as x-rays have not shown any significant changes to explain the pain that she gets.  I will contact the patient with the results of the evaluation once it is complete.    Plan:     Follow-up with Marguerite Bailey PA-C will depend on results of today's work up  Imaging: MRI left knee  Labs: dsDNA, complement levels, NALDO panel, antiphospholipid panel, Urine (looking for protein), Scl-70, Centromere antibody, CCP antibody, Rheumatoid factor, CRP and Sed Rate    Marguerite Bailey, MARTHA  Rheumatology         History of Present Illness:   Lety Alcantara presents for evaluation of joint pain.      She states that she has OA in her knees. She states that she will get episodes where they hurt and will swell up. This will then resolve and then come back. Sometimes she will get low back pain and occasionally pain in her toes and hands. Grandmother with psoriatic arthritis and psoriasis. She states that stuff she has read online, this has caused her to question her diagnosis. She states that she has had toenail issues for years and was told that it is not a fungus. She does get chilblains and was told that the testing for lupus was negative. She states that she likes to run that the knee pain can prohibit this. No mouth sores. She used to get red itchy and flaky patches on her elbows. Father with psoriasis so she thought it was that, but she never had it evaluated. She has had hair loss, she will notice this more when her joint pain is worse. No dry eyes or dry mouth. History of seizures from  age 20-40's. She was on various medications. She states they were epileptic seizures that stopped and she was able to taper off medications. She states that she has hashimoto's. No unexplained fevers or weight loss. No history of delirium or psychosis. No history of pericarditis or pleural effusion. She does report raynaud's. No shortness of breath. No trouble swallowing food/medications.     Sister with thyroid disorder.          Review of Systems:     Constitutional: negative  Skin: as above  Eyes: negative  Ears/Nose/Throat: negative  Respiratory: No shortness of breath, dyspnea on exertion, cough, or hemoptysis  Cardiovascular: negative  Gastrointestinal: negative  Genitourinary: negative  Musculoskeletal: as above  Neurologic: negative  Psychiatric: negative  Hematologic/Lymphatic/Immunologic: negative  Endocrine: negative         Active Problem List:     Patient Active Problem List    Diagnosis Date Noted    Mild episode of recurrent major depressive disorder (H24) 10/20/2022     Priority: Medium    Mechanical breakdown of intrauterine contraceptive device, initial encounter 2021     Priority: Medium     Added automatically from request for surgery [id number]      Right-sided low back pain with right-sided sciatica, unspecified chronicity 2019     Priority: Medium    Seizures (H) 2018     Priority: Medium    Hypothyroidism due to Hashimoto's thyroiditis 2018     Priority: Medium    IUD (intrauterine device) in place, Paragard 2018     Priority: Medium    Mood swings 05/15/2015     Priority: Medium            Past Medical History:     Past Medical History:   Diagnosis Date    Arthritis 2017    Depressive disorder 2000    Epilepsy (H) 2000    Hypothyroidism     Hypothyroidism due to Hashimoto's thyroiditis 2018     Past Surgical History:   Procedure Laterality Date     SECTION  01    OPERATIVE HYSTEROSCOPY N/A 2021    Procedure: HYSTEROSCOPIC removal of  intrauterine device;  Surgeon: Che Pizano MD;  Location: [facility] OR            Social History:     Social History     Socioeconomic History    Marital status: Single     Spouse name: Not on file    Number of children: Not on file    Years of education: Not on file    Highest education level: Not on file   Occupational History    Not on file   Tobacco Use    Smoking status: Never    Smokeless tobacco: Never   Vaping Use    Vaping status: Never Used   Substance and Sexual Activity    Alcohol use: Not Currently     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   Other Topics Concern    Parent/sibling w/ CABG, MI or angioplasty before 65F 55M? No   Social History Narrative    Not on file     Social Determinants of Health     Financial Resource Strain: Low Risk  (10/26/2023)    Financial Resource Strain     Within the past 12 months, have you or your family members you live with been unable to get utilities (heat, electricity) when it was really needed?: No   Food Insecurity: Low Risk  (10/26/2023)    Food Insecurity     Within the past 12 months, did you worry that your food would run out before you got money to buy more?: No     Within the past 12 months, did the food you bought just not last and you didn t have money to get more?: No   Transportation Needs: Low Risk  (10/26/2023)    Transportation Needs     Within the past 12 months, has lack of transportation kept you from medical appointments, getting your medicines, non-medical meetings or appointments, work, or from getting things that you need?: No   Physical Activity: Not on file   Stress: Not on file   Social Connections: Not on file   Interpersonal Safety: Low Risk  (10/26/2023)    Interpersonal Safety     Do you feel physically and emotionally safe where you currently live?: Yes     Within the past 12 months, have you been hit, slapped, kicked or  otherwise physically hurt by someone?: No     Within the past 12 months, have you been humiliated or emotionally abused in other ways by your partner or ex-partner?: No   Housing Stability: Low Risk  (10/26/2023)    Housing Stability     Do you have housing? : Yes     Are you worried about losing your housing?: No          Family History:     Family History   Problem Relation Age of Onset    Diabetes Brother     Coronary Artery Disease Father     Hypertension Father     Hyperlipidemia Father     Depression Father     Hypertension Mother     Hyperlipidemia Mother     Breast Cancer Mother         over 70 when had breast cancer    Other Cancer Mother     Depression Sister     Anxiety Disorder Sister     Depression Daughter     Anxiety Disorder Daughter     Asthma Daughter     Thyroid Disease Sister             Allergies:     Allergies   Allergen Reactions    Molds & Smuts Anaphylaxis    Seafood Anaphylaxis    Sulfa Antibiotics Rash            Medications:     Current Outpatient Medications   Medication Sig Dispense Refill    busPIRone (BUSPAR) 5 MG tablet Take 1 tablet (5 mg) by mouth 2 times daily 180 tablet 3    diclofenac (VOLTAREN) 1 % topical gel Apply 4 g topically 4 times daily Apply to hand and bilateral knees 150 g 3    estradiol (ESTRACE) 1 MG tablet TAKE 1 TABLET BY MOUTH EVERY DAY 90 tablet 1    levothyroxine (SYNTHROID/LEVOTHROID) 112 MCG tablet TAKE 1 TABLET BY MOUTH DAILY 90 tablet 2    progesterone (PROMETRIUM) 100 MG capsule TAKE 1 CAPSULE BY MOUTH EVERY DAY 90 capsule 1    sertraline (ZOLOFT) 50 MG tablet TAKE 1 TABLET BY MOUTH EVERY DAY 90 tablet 2            Physical Exam:   Blood pressure 127/74, pulse 57, weight 48.4 kg (106 lb 11.2 oz), last menstrual period 04/06/2018, SpO2 99%, not currently breastfeeding.  Wt Readings from Last 6 Encounters:   08/08/24 48.4 kg (106 lb 11.2 oz)   10/26/23 48.5 kg (107 lb)   10/20/22 49.4 kg (109 lb)   08/31/22 49.4 kg (109 lb)   08/11/21 49 kg (108 lb)    08/02/21 49.2 kg (108 lb 7.5 oz)     Constitutional: well-developed, appearing stated age; cooperative  Eyes: nl PERRLA, conjunctiva, sclera  ENT: nl external ears, nose, hearing, lips, teeth, gums, throat. No mucositis.   No mucous membrane lesions, normal saliva pool  Neck: no mass or thyroid enlargement  Resp: lungs clear to auscultation  CV: RRR, no murmurs, rubs or gallops, no edema  Lymph: no cervical, supraclavicular or epitrochlear nodes  MS: The TMJ, neck, shoulder, elbow, wrist, MCP/PIP/DIP, spine, knee, ankle, and foot MTP/IP joints were examined and found normal. No active synovitis or altered joint anatomy. Full joint ROM. Normal  strength. No dactylitis,  tenosynovitis, enthesopathy.   Skin: no nail pitting, alopecia, rash, nodules or lesions.   Psych: nl judgement, orientation, memory, affect.           Data:   Imaging:  MRI SI joints 08/27/2019  IMPRESSION:   1. Mild osteoarthritis of the sacroiliac joints, right greater than left.   2. No evidence of sacroiliitis.   3. Mild osteoarthritis of the right hip joint.     08/11/2021 xray knees  IMPRESSION:   1. Narrowing medial aspect patellofemoral joint left worse than right.  2. Mild narrowing lateral femoral tibial joint space on the right.  3. Small knee effusion on the right.    Xray hands 02/08/2022  Impression:  1. No acute osseous abnormality.  2. Mild to moderate degenerative changes the first metacarpal and  triscaphe joints, left greater than right.    Laboratory:  8/18/2022  Creatinine 0.65, GFR greater than 90  Albumin 3.8  ALT 19, AST 19  CRP less than 2.9  Rheumatoid factor less than 6  Vitamin D 55  White blood cell count 3.9, hemoglobin 13.0, platelet count 300  Sed rate 10  Lyme disease negative  LEONARDO positive with a homogenous pattern and a titer of 1:160    10/26/2023  Hepatitis B nonreactive  White blood cell count 9.1, hemoglobin 13.3, platelet count 339  TSH 1.71  TTG antibodies negative  ALT 16, AST 28  Albumin  4.4  Creatinine 0.64, GFR greater than 90

## 2024-08-08 NOTE — PATIENT INSTRUCTIONS
After Visit Instructions:     Thank you for coming to [facility] for your care. It is my goal to partner with you to help you reach your optimal state of health.       Plan:     Follow-up with Marguerite Bailey PA-C will depend on results of today's work up  Imaging: MRI left knee  Labs: dsDNA, complement levels, NALDO panel, antiphospholipid panel, Urine (looking for protein), Scl-70, Centromere antibody, CCP antibody, Rheumatoid factor, CRP and Sed Rate    Marguerite Bailey PA-C  [facility]  [facility]    Contact information: [facility]  Clinic Number:  [phone]  Please call or send a [employer] message with any questions about your care

## 2024-08-09 LAB
B2 GLYCOPROT1 IGG SERPL IA-ACNC: <0.8 U/ML
B2 GLYCOPROT1 IGM SERPL IA-ACNC: <2.4 U/ML
C3 SERPL-MCNC: 96 MG/DL (ref 81–157)
C4 SERPL-MCNC: 10 MG/DL (ref 13–39)
CARDIOLIPIN IGG SER IA-ACNC: <2 GPL-U/ML
CARDIOLIPIN IGG SER IA-ACNC: NEGATIVE
CARDIOLIPIN IGM SER IA-ACNC: <2 MPL-U/ML
CARDIOLIPIN IGM SER IA-ACNC: NEGATIVE
CENTROMERE B AB SER-ACNC: <0.7 U/ML
CENTROMERE B AB SER-ACNC: NEGATIVE
DRVVT SCREEN RATIO: 0.86
ENA SCL70 IGG SER IA-ACNC: <0.6 U/ML
ENA SCL70 IGG SER IA-ACNC: NEGATIVE
INR PPP: 0.98 (ref 0.85–1.15)
LA PPP-IMP: NEGATIVE
LUPUS INTERPRETATION: NORMAL
PTT RATIO: 1.03
THROMBIN TIME: 16.5 SECONDS (ref 13–19)

## 2024-08-12 ENCOUNTER — MYC MEDICAL ADVICE (OUTPATIENT)
Dept: RHEUMATOLOGY | Facility: CLINIC | Age: 59
End: 2024-08-12
Payer: OTHER GOVERNMENT

## 2024-08-12 ENCOUNTER — MYC MEDICAL ADVICE (OUTPATIENT)
Dept: FAMILY MEDICINE | Facility: CLINIC | Age: 59
End: 2024-08-12
Payer: OTHER GOVERNMENT

## 2024-08-12 DIAGNOSIS — R76.8 POSITIVE ANA (ANTINUCLEAR ANTIBODY): ICD-10-CM

## 2024-08-12 DIAGNOSIS — R77.8 LOW SERUM COMPLEMENT C4: ICD-10-CM

## 2024-08-12 DIAGNOSIS — M25.50 MULTIPLE JOINT PAIN: Primary | ICD-10-CM

## 2024-08-12 DIAGNOSIS — R77.8 LOW SERUM COMPLEMENT C4: Primary | ICD-10-CM

## 2024-08-12 LAB
CCP AB SER IA-ACNC: 1.5 U/ML
DSDNA AB SER-ACNC: 5.7 IU/ML
ENA SM IGG SER IA-ACNC: <0.7 U/ML
ENA SM IGG SER IA-ACNC: NEGATIVE
ENA SS-A AB SER IA-ACNC: <0.5 U/ML
ENA SS-A AB SER IA-ACNC: NEGATIVE
ENA SS-B IGG SER IA-ACNC: 1.1 U/ML
ENA SS-B IGG SER IA-ACNC: NEGATIVE
U1 SNRNP IGG SER IA-ACNC: 4.2 U/ML
U1 SNRNP IGG SER IA-ACNC: NEGATIVE

## 2024-08-13 DIAGNOSIS — F41.1 GAD (GENERALIZED ANXIETY DISORDER): ICD-10-CM

## 2024-08-14 NOTE — TELEPHONE ENCOUNTER
Writer replied to patient via [employer].  NIHARIKA CoyleN, RN (she/her)  [facility] RN

## 2024-08-20 ENCOUNTER — MYC MEDICAL ADVICE (OUTPATIENT)
Dept: RHEUMATOLOGY | Facility: CLINIC | Age: 59
End: 2024-08-20
Payer: OTHER GOVERNMENT

## 2024-08-21 NOTE — TELEPHONE ENCOUNTER
"Patient sent [employer] asking if she could switch her MRI of Left Knee scheduled on 8/29/24 to her Right Knee.    Lynn replied with the following routing comment:  \"  If she wants to switch to the right knee I would recommend that she call the imaging center to make sure that it is okay to switch. She may not need to reschedule it, but sometimes MRI's require prior authorizations with insurance companies   \"    Writer relayed this information back to patient via [employer].    Fie KAPLAN [facility]    "

## 2024-08-26 ENCOUNTER — ANCILLARY PROCEDURE (OUTPATIENT)
Dept: MAMMOGRAPHY | Facility: CLINIC | Age: 59
End: 2024-08-26
Attending: NURSE PRACTITIONER
Payer: OTHER GOVERNMENT

## 2024-08-26 DIAGNOSIS — Z12.31 VISIT FOR SCREENING MAMMOGRAM: ICD-10-CM

## 2024-08-26 PROCEDURE — 77063 BREAST TOMOSYNTHESIS BI: CPT

## 2024-08-26 PROCEDURE — 77067 SCR MAMMO BI INCL CAD: CPT | Mod: 26 | Performed by: STUDENT IN AN ORGANIZED HEALTH CARE EDUCATION/TRAINING PROGRAM

## 2024-08-26 PROCEDURE — 77063 BREAST TOMOSYNTHESIS BI: CPT | Mod: 26 | Performed by: STUDENT IN AN ORGANIZED HEALTH CARE EDUCATION/TRAINING PROGRAM

## 2024-08-29 ENCOUNTER — HOSPITAL ENCOUNTER (OUTPATIENT)
Dept: MRI IMAGING | Facility: CLINIC | Age: 59
Discharge: HOME | End: 2024-08-29
Attending: PHYSICIAN ASSISTANT | Admitting: PHYSICIAN ASSISTANT
Payer: OTHER GOVERNMENT

## 2024-08-29 DIAGNOSIS — R76.8 POSITIVE ANA (ANTINUCLEAR ANTIBODY): ICD-10-CM

## 2024-08-29 DIAGNOSIS — M25.50 MULTIPLE JOINT PAIN: ICD-10-CM

## 2024-08-29 PROCEDURE — 73723 MRI JOINT LWR EXTR W/O&W/DYE: CPT | Mod: LT

## 2024-08-29 PROCEDURE — 73723 MRI JOINT LWR EXTR W/O&W/DYE: CPT | Mod: 26 | Performed by: RADIOLOGY

## 2024-08-29 PROCEDURE — A9585 GADOBUTROL INJECTION: HCPCS | Performed by: PHYSICIAN ASSISTANT

## 2024-08-29 PROCEDURE — 255N000002 HC RX 255 OP 636: Performed by: PHYSICIAN ASSISTANT

## 2024-08-29 RX ORDER — GADOBUTROL 604.72 MG/ML
4.8 INJECTION INTRAVENOUS ONCE
Status: COMPLETED | OUTPATIENT
Start: 2024-08-29 | End: 2024-08-29

## 2024-08-29 RX ADMIN — GADOBUTROL 4.8 ML: 604.72 INJECTION INTRAVENOUS at 15:35

## 2024-08-30 ENCOUNTER — MYC MEDICAL ADVICE (OUTPATIENT)
Dept: RHEUMATOLOGY | Facility: CLINIC | Age: 59
End: 2024-08-30
Payer: OTHER GOVERNMENT

## 2024-08-30 DIAGNOSIS — M17.12 PRIMARY OSTEOARTHRITIS OF LEFT KNEE: ICD-10-CM

## 2024-09-08 DIAGNOSIS — E06.3 HYPOTHYROIDISM DUE TO HASHIMOTO'S THYROIDITIS: ICD-10-CM

## 2024-09-08 DIAGNOSIS — Z00.00 ROUTINE GENERAL MEDICAL EXAMINATION AT A HEALTH CARE FACILITY: ICD-10-CM

## 2024-09-08 RX ORDER — LEVOTHYROXINE SODIUM 112 UG/1
112 TABLET ORAL DAILY
Qty: 90 TABLET | Refills: 0 | Status: SHIPPED | OUTPATIENT
Start: 2024-09-08

## 2024-09-17 NOTE — TELEPHONE ENCOUNTER
DIAGNOSIS: LT Knee   APPOINTMENT DATE: 09/18/2024   NOTES STATUS DETAILS   OFFICE NOTE from referring provider Self    OFFICE NOTE from other specialist Internal 8/8/2024 Marguerite Bailey PA-C (Rheumatology)   MEDICATION LIST Internal    MRI Internal 8/29/2024 LT Knee w/o & w IV contrast

## 2024-09-18 ENCOUNTER — PRE VISIT (OUTPATIENT)
Dept: ORTHOPEDICS | Facility: CLINIC | Age: 59
End: 2024-09-18

## 2024-09-18 ENCOUNTER — OFFICE VISIT (OUTPATIENT)
Dept: ORTHOPEDICS | Facility: CLINIC | Age: 59
End: 2024-09-18
Payer: OTHER GOVERNMENT

## 2024-09-18 DIAGNOSIS — M17.0 BILATERAL PRIMARY OSTEOARTHRITIS OF KNEE: Primary | ICD-10-CM

## 2024-09-18 PROCEDURE — 99213 OFFICE O/P EST LOW 20 MIN: CPT | Performed by: FAMILY MEDICINE

## 2024-09-18 NOTE — LETTER
9/18/2024      RE: Lety Alcantara  [address]  [facility] [address]     Dear Colleague,    Thank you for referring your patient, Lety Alcantara, to the [facility] [place]. Please see a copy of my visit note below.    CHIEF COMPLAINT:  Pain of the Left Knee and Pain of the Right Knee       HISTORY OF PRESENT ILLNESS  Ms. Alcantara is a 59 year old female who presents to clinic today with bilateral knee pain.  Lety reports bilateral knee pain intermittently for the last 3-4 years. Pain is located anterior and medial aspect of the knee. She reports pain with running and going downstairs. She has been icing and activity modication for treatment.         Additional history: as documented    MEDICAL HISTORY  Patient Active Problem List   Diagnosis     Mood swings     Seizures (H)     Hypothyroidism due to Hashimoto's thyroiditis     IUD (intrauterine device) in place, Paragard     Right-sided low back pain with right-sided sciatica, unspecified chronicity     Mechanical breakdown of intrauterine contraceptive device, initial encounter     Mild episode of recurrent major depressive disorder (H24)       Current Outpatient Medications   Medication Sig Dispense Refill     busPIRone (BUSPAR) 5 MG tablet Take 1 tablet (5 mg) by mouth 2 times daily 180 tablet 3     diclofenac (VOLTAREN) 1 % topical gel Apply 4 g topically 4 times daily Apply to hand and bilateral knees 150 g 3     estradiol (ESTRACE) 1 MG tablet TAKE 1 TABLET BY MOUTH EVERY DAY 90 tablet 1     levothyroxine (SYNTHROID/LEVOTHROID) 112 MCG tablet TAKE 1 TABLET BY MOUTH EVERY DAY 90 tablet 0     progesterone (PROMETRIUM) 100 MG capsule TAKE 1 CAPSULE BY MOUTH EVERY DAY 90 capsule 1     sertraline (ZOLOFT) 50 MG tablet TAKE 1 TABLET BY MOUTH EVERY DAY 90 tablet 2       Allergies   Allergen Reactions     Molds & Smuts Anaphylaxis     Seafood Anaphylaxis     Sulfa Antibiotics Rash       Family History   Problem  Relation Age of Onset     Diabetes Brother      Coronary Artery Disease Father      Hypertension Father      Hyperlipidemia Father      Depression Father      Hypertension Mother      Hyperlipidemia Mother      Breast Cancer Mother         over 70 when had breast cancer     Other Cancer Mother      Depression Sister      Anxiety Disorder Sister      Depression Daughter      Anxiety Disorder Daughter      Asthma Daughter      Thyroid Disease Sister        Additional medical/Social/Surgical histories reviewed in EPIC and updated as appropriate.        PHYSICAL EXAM  General  - normal appearance, in no obvious distress  Musculoskeletal - right and left knee  - inspection: trace effusion bilaterally  - palpation: medial joint line tenderness bilaterally  - ROM: 120 degrees flexion, 0 degrees extension, painful active ROM  - strength: 5/5 in flexion, 5/5 in extension  - special tests:  (-) Edna  (-) varus at 0 and 30 degrees flexion  (-) valgus at 0 and 30 degrees flexion  Neuro  - no sensory or motor deficit, grossly normal coordination, normal muscle tone              ASSESSMENT & PLAN  Ms. Alcantara is a 59 year old female who presents to clinic today with bilateral knee pain.    I reviewed her left knee MRI in the room with her, this does reveal tricompartmental osteoarthritis and patellofemoral bone bruising.    Her symptoms do seem to be secondary to bilateral knee osteoarthritis, we discussed the pathophysiology of this in some depth.  We also discussed treatment options including continuing to remain active, analgesics as needed, kinesiotaping for patellofemoral issues, and the role of corticosteroid injections.  She is running a [place] race this weekend, I do think it would be most reasonable to return to clinic in the next couple of weeks to consider having knee injections done.  She is going to follow-up then.    It was a pleasure seeing Lety today.    Jarret Pedroza, DO, CAQSM  Primary Care Sports  Medicine      This note was constructed using Dragon dictation software, please excuse any minor errors in spelling, grammar, or syntax.       Again, thank you for allowing me to participate in the care of your patient.      Sincerely,    Jarret Pedroza, DO

## 2024-09-18 NOTE — PROGRESS NOTES
CHIEF COMPLAINT:  Pain of the Left Knee and Pain of the Right Knee       HISTORY OF PRESENT ILLNESS  Ms. Alcantara is a 59 year old female who presents to clinic today with bilateral knee pain.  Lety has bilateral knee pain that is longstanding.  She does have a history of patellofemoral osteoarthritis for which she has tried many things over the years including ice, NSAIDs, oral medications, and lifestyle modifications.  She ***        Additional history: as documented    MEDICAL HISTORY  Patient Active Problem List   Diagnosis    Mood swings    Seizures (H)    Hypothyroidism due to Hashimoto's thyroiditis    IUD (intrauterine device) in place, Paragard    Right-sided low back pain with right-sided sciatica, unspecified chronicity    Mechanical breakdown of intrauterine contraceptive device, initial encounter    Mild episode of recurrent major depressive disorder (H24)       Current Outpatient Medications   Medication Sig Dispense Refill    busPIRone (BUSPAR) 5 MG tablet Take 1 tablet (5 mg) by mouth 2 times daily 180 tablet 3    diclofenac (VOLTAREN) 1 % topical gel Apply 4 g topically 4 times daily Apply to hand and bilateral knees 150 g 3    estradiol (ESTRACE) 1 MG tablet TAKE 1 TABLET BY MOUTH EVERY DAY 90 tablet 1    levothyroxine (SYNTHROID/LEVOTHROID) 112 MCG tablet TAKE 1 TABLET BY MOUTH EVERY DAY 90 tablet 0    progesterone (PROMETRIUM) 100 MG capsule TAKE 1 CAPSULE BY MOUTH EVERY DAY 90 capsule 1    sertraline (ZOLOFT) 50 MG tablet TAKE 1 TABLET BY MOUTH EVERY DAY 90 tablet 2       Allergies   Allergen Reactions    Molds & Smuts Anaphylaxis    Seafood Anaphylaxis    Sulfa Antibiotics Rash       Family History   Problem Relation Age of Onset    Diabetes Brother     Coronary Artery Disease Father     Hypertension Father     Hyperlipidemia Father     Depression Father     Hypertension Mother     Hyperlipidemia Mother     Breast Cancer Mother         over 70 when had breast cancer    Other Cancer Mother      Depression Sister     Anxiety Disorder Sister     Depression Daughter     Anxiety Disorder Daughter     Asthma Daughter     Thyroid Disease Sister        Additional medical/Social/Surgical histories reviewed in EPIC and updated as appropriate.        PHYSICAL EXAM  ***      {Diagnostic Test Results (Optional):747543}     ASSESSMENT & PLAN  Ms. Alcantara is a 59 year old female who presents to clinic today with ***.    I ordered and independently reviewed an xray of her ***, this reveals ***        It was a pleasure seeing Lety today.    Jarret Pedroza DO, [facility]  Primary Care Sports Medicine      This note was constructed using Dragon dictation software, please excuse any minor errors in spelling, grammar, or syntax.

## 2024-09-25 ENCOUNTER — OFFICE VISIT (OUTPATIENT)
Dept: ORTHOPEDICS | Facility: CLINIC | Age: 59
End: 2024-09-25
Payer: OTHER GOVERNMENT

## 2024-09-25 DIAGNOSIS — M17.0 BILATERAL PRIMARY OSTEOARTHRITIS OF KNEE: Primary | ICD-10-CM

## 2024-09-25 PROCEDURE — 20610 DRAIN/INJ JOINT/BURSA W/O US: CPT | Mod: 50 | Performed by: FAMILY MEDICINE

## 2024-09-25 RX ADMIN — TRIAMCINOLONE ACETONIDE 40 MG: 40 INJECTION, SUSPENSION INTRA-ARTICULAR; INTRAMUSCULAR at 17:16

## 2024-09-25 RX ADMIN — LIDOCAINE HYDROCHLORIDE 4 ML: 10 INJECTION, SOLUTION EPIDURAL; INFILTRATION; INTRACAUDAL; PERINEURAL at 17:16

## 2024-09-25 NOTE — NURSING NOTE
[address] [address]  Dept: [phone]  ______________________________________________________________________________    Patient: Lety Alcantara   : 1965   MRN: [id number]   2024    INVASIVE PROCEDURE SAFETY CHECKLIST    Date: 2024   Procedure: Bilateral knee CSI   Patient Name: Lety Alcantara  MRN: [id number]  YOB: 1965    Action: Complete sections as appropriate. Any discrepancy results in a HARD COPY until resolved.     PRE PROCEDURE:  Patient ID verified with 2 identifiers (name and  or MRN): Yes  Procedure and site verified with patient/designee (when able): Yes  Accurate consent documentation in medical record: Yes  H&P (or appropriate assessment) documented in medical record: Yes  H&P must be up to 20 days prior to procedure and updates within 24 hours of procedure as applicable: NA  Relevant diagnostic and radiology test results appropriately labeled and displayed as applicable: Yes  Procedure site(s) marked with provider initials: NA    TIMEOUT:  Time-Out performed immediately prior to starting procedure, including verbal and active participation of all team members addressing the following:Yes  * Correct patient identify  * Confirmed that the correct side and site are marked  * An accurate procedure consent form  * Agreement on the procedure to be done  * Correct patient position  * Relevant images and results are properly labeled and appropriately displayed  * The need to administer antibiotics or fluids for irrigation purposes during the procedure as applicable   * Safety precautions based on patient history or medication use    DURING PROCEDURE: Verification of correct person, site, and procedures any time the responsibility for care of the patient is transferred to another member of the care team.       Prior to injection, verified patient identity using patient's name and  date of birth.  Due to injection administration, patient instructed to remain in clinic for 15 minutes  afterwards, and to report any adverse reaction to me immediately.    Joint injection was performed.      Drug Amount Wasted:  Yes: 2 mg/ml lidocaine   Vial/Syringe: Single dose vial  Expiration Date:  04/28       Leslee Rico [facility]  September 25, 2024

## 2024-09-25 NOTE — LETTER
9/25/2024      RE: Lety Alcantara  [address]  [facility] [address]     Dear Colleague,    Thank you for referring your patient, Lety Alcantara, to the [facility] [place]. Please see a copy of my visit note below.    Lety has bilateral knee pain.  She is here for bilateral knee injections.          Large Joint Injection/Arthocentesis: bilateral knee    Date/Time: 9/25/2024 5:16 PM    Performed by: Jarret Pedroza DO  Authorized by: Jarret Pedroza DO    Indications:  Pain and osteoarthritis  Needle Size:  22 G  Guidance: landmark guided    Approach:  Anterolateral  Location:  Knee  Laterality:  Bilateral      Medications (Right):  40 mg triamcinolone 40 MG/ML; 4 mL lidocaine (PF) 1 %  Medications (Left):  40 mg triamcinolone 40 MG/ML; 4 mL lidocaine (PF) 1 %  Outcome:  Tolerated well, no immediate complications  Procedure discussed: discussed risks, benefits, and alternatives    Consent Given by:  Patient  Timeout: timeout called immediately prior to procedure    Prep: patient was prepped and draped in usual sterile fashion         PROCEDURE    Knee Injections - Intraarticular    The patient was informed of the risks and the benefits of the procedure and a written consent was signed.    The patient's left knee was prepped with chlorhexidine in sterile fashion.   40 mg of triamcinolone suspension was drawn up into a 5 mL syringe with 4 mL of 1% lidocaine.  Injection was performed using substerile technique.  A 1.5-inch 22-gauge needle was used to enter the lateral aspect of the left knee.  Injection performed successfully without difficulty.  There were no complications. The patient tolerated the procedure well. There was negligible bleeding.     The patient's right knee was prepped with chlorhexidine in sterile fashion.   40 mg of triamcinolone suspension was drawn up into a 5 mL syringe with 4 mL of 1% lidocaine.  Injection was performed using substerile  technique.  A 1.5-inch 22-gauge needle was used to enter the lateral aspect of the right knee.  Injection performed successfully without difficulty.  There were no complications. The patient tolerated the procedure well. There was negligible bleeding.                 Again, thank you for allowing me to participate in the care of your patient.      Sincerely,    Jarret Pedroza DO

## 2024-09-25 NOTE — PROGRESS NOTES
Lety has bilateral knee pain.  She is here for bilateral knee injections.          Large Joint Injection/Arthocentesis: bilateral knee    Date/Time: 9/25/2024 5:16 PM    Performed by: Jarert Pedroza DO  Authorized by: Jarret Pedroza DO    Indications:  Pain and osteoarthritis  Needle Size:  22 G  Guidance: landmark guided    Approach:  Anterolateral  Location:  Knee  Laterality:  Bilateral      Medications (Right):  40 mg triamcinolone 40 MG/ML; 4 mL lidocaine (PF) 1 %  Medications (Left):  40 mg triamcinolone 40 MG/ML; 4 mL lidocaine (PF) 1 %  Outcome:  Tolerated well, no immediate complications  Procedure discussed: discussed risks, benefits, and alternatives    Consent Given by:  Patient  Timeout: timeout called immediately prior to procedure    Prep: patient was prepped and draped in usual sterile fashion         PROCEDURE    Knee Injections - Intraarticular    The patient was informed of the risks and the benefits of the procedure and a written consent was signed.    The patient's left knee was prepped with chlorhexidine in sterile fashion.   40 mg of triamcinolone suspension was drawn up into a 5 mL syringe with 4 mL of 1% lidocaine.  Injection was performed using substerile technique.  A 1.5-inch 22-gauge needle was used to enter the lateral aspect of the left knee.  Injection performed successfully without difficulty.  There were no complications. The patient tolerated the procedure well. There was negligible bleeding.     The patient's right knee was prepped with chlorhexidine in sterile fashion.   40 mg of triamcinolone suspension was drawn up into a 5 mL syringe with 4 mL of 1% lidocaine.  Injection was performed using substerile technique.  A 1.5-inch 22-gauge needle was used to enter the lateral aspect of the right knee.  Injection performed successfully without difficulty.  There were no complications. The patient tolerated the procedure well. There was negligible bleeding.

## 2024-09-26 RX ORDER — LIDOCAINE HYDROCHLORIDE 10 MG/ML
4 INJECTION, SOLUTION EPIDURAL; INFILTRATION; INTRACAUDAL; PERINEURAL
Status: SHIPPED | OUTPATIENT
Start: 2024-09-25

## 2024-09-26 RX ORDER — TRIAMCINOLONE ACETONIDE 40 MG/ML
40 INJECTION, SUSPENSION INTRA-ARTICULAR; INTRAMUSCULAR
Status: SHIPPED | OUTPATIENT
Start: 2024-09-25

## 2024-10-01 ENCOUNTER — MYC MEDICAL ADVICE (OUTPATIENT)
Dept: ORTHOPEDICS | Facility: CLINIC | Age: 59
End: 2024-10-01
Payer: OTHER GOVERNMENT

## 2024-10-01 DIAGNOSIS — M17.11 ARTHRITIS OF RIGHT KNEE: ICD-10-CM

## 2024-10-01 DIAGNOSIS — M23.91 INTERNAL DERANGEMENT OF RIGHT KNEE: ICD-10-CM

## 2024-10-01 DIAGNOSIS — M17.0 BILATERAL PRIMARY OSTEOARTHRITIS OF KNEE: Primary | ICD-10-CM

## 2024-10-12 DIAGNOSIS — M25.50 MULTIPLE JOINT PAIN: ICD-10-CM

## 2024-10-12 DIAGNOSIS — Z78.0 POST-MENOPAUSAL: ICD-10-CM

## 2024-10-13 DIAGNOSIS — Z78.0 POST-MENOPAUSAL: ICD-10-CM

## 2024-10-13 DIAGNOSIS — M25.50 MULTIPLE JOINT PAIN: ICD-10-CM

## 2024-10-15 RX ORDER — PROGESTERONE 100 MG/1
100 CAPSULE ORAL DAILY
Qty: 90 CAPSULE | Refills: 0 | Status: SHIPPED | OUTPATIENT
Start: 2024-10-15 | End: 2024-11-01

## 2024-10-15 RX ORDER — ESTRADIOL 1 MG/1
1 TABLET ORAL DAILY
Qty: 90 TABLET | Refills: 0 | Status: SHIPPED | OUTPATIENT
Start: 2024-10-15 | End: 2024-11-01

## 2024-10-15 NOTE — TELEPHONE ENCOUNTER
Rx approved to get patient through already scheduled appointment.  Dr. Lois Alejandre MD / [facility]

## 2024-10-15 NOTE — TELEPHONE ENCOUNTER
Rx approved to get patient through already scheduled appointment.  Dr. Lois Alejandre MD / [facility]

## 2024-10-28 ENCOUNTER — ANCILLARY PROCEDURE (OUTPATIENT)
Dept: MRI IMAGING | Facility: CLINIC | Age: 59
End: 2024-10-28
Attending: FAMILY MEDICINE
Payer: OTHER GOVERNMENT

## 2024-10-28 DIAGNOSIS — M17.11 ARTHRITIS OF RIGHT KNEE: ICD-10-CM

## 2024-10-28 DIAGNOSIS — M23.91 INTERNAL DERANGEMENT OF RIGHT KNEE: ICD-10-CM

## 2024-10-28 PROCEDURE — 73721 MRI JNT OF LWR EXTRE W/O DYE: CPT | Mod: RT | Performed by: RADIOLOGY

## 2024-10-30 ENCOUNTER — OFFICE VISIT (OUTPATIENT)
Dept: ORTHOPEDICS | Facility: CLINIC | Age: 59
End: 2024-10-30
Payer: OTHER GOVERNMENT

## 2024-10-30 DIAGNOSIS — M17.0 BILATERAL PRIMARY OSTEOARTHRITIS OF KNEE: Primary | ICD-10-CM

## 2024-10-30 PROCEDURE — 99213 OFFICE O/P EST LOW 20 MIN: CPT | Performed by: FAMILY MEDICINE

## 2024-10-30 NOTE — LETTER
10/30/2024      RE: Lety Alcantara  [address]  [facility] [address]     Dear Colleague,    Thank you for referring your patient, Lety Alcantara, to the [facility] [place]. Please see a copy of my visit note below.    HISTORY OF PRESENT ILLNESS  Ms. Alcantara is a pleasant 59 year old female following up with bilateral knee pain.  Lety is here to review her MRI imaging.  She last saw me on September 25, I injected both of her knees at that visit.  These injections were helpful for about a month and then wore off. Pain is not as bad as before but still painful.      PHYSICAL EXAM  General  - normal appearance, in no obvious distress  Musculoskeletal - right and left knee  - inspection: trace effusion bilaterally  - palpation: medial joint line tenderness bilaterally  - ROM: 120 degrees flexion, 0 degrees extension, painful active ROM  - strength: 5/5 in flexion, 5/5 in extension  - special tests:  (-) Edna  (-) varus at 0 and 30 degrees flexion  (-) valgus at 0 and 30 degrees flexion  Neuro  - no sensory or motor deficit, grossly normal coordination, normal muscle tone      ASSESSMENT & PLAN  Ms. Alcantara is a 59 year old female following up with bilateral knee pain.    I reviewed her MR imaging in the room with her.  Her right knee MR reads:  1. Grade 4 patellofemoral chondromalacia. Medial compartment diffuse  cartilage thinning without full thickness cartilage loss. Intact  lateral compartment.  2. Free edge blunting of the posterior horn of the medial meniscus  with a small vertical tear at the junction with the posterior root.  3. Intact cruciate ligaments and medial/lateral supporting structures  and lateral meniscus.  Her left knee MR reads:  1. Grade 4 tricompartmental chondromalacia, most severe in the  patellofemoral compartment.  2. Horizontal tearing of the body and posterior horn of the medial  meniscus    I had a good discussion with Lety is  centering around these MRs and their implications.  We did discuss the utility of repeat injections over time, both with corticosteroid and with hyaluronic acid.  I do think she would be a great candidate for hyaluronic acid treatment, we will start the preapproval process for this.  I am also referring her again to physical therapy.    If she does well we can follow-up as needed for this and other issues.    It was a pleasure seeing Lety.        Jarret Pedroza DO, CAQSM      This note was constructed using Dragon dictation software, please excuse any minor errors in spelling, grammar, or syntax.      Again, thank you for allowing me to participate in the care of your patient.      Sincerely,    Jarret Pedroza DO

## 2024-10-30 NOTE — PROGRESS NOTES
HISTORY OF PRESENT ILLNESS  Ms. Alcantara is a pleasant 59 year old female following up with bilateral knee pain.  Lety is here to review her MRI imaging.  She last saw me on September 25, I injected both of her knees at that visit.  These injections were helpful for about a month and then wore off. Pain is not as bad as before but still painful.      PHYSICAL EXAM  General  - normal appearance, in no obvious distress  Musculoskeletal - right and left knee  - inspection: trace effusion bilaterally  - palpation: medial joint line tenderness bilaterally  - ROM: 120 degrees flexion, 0 degrees extension, painful active ROM  - strength: 5/5 in flexion, 5/5 in extension  - special tests:  (-) Edna  (-) varus at 0 and 30 degrees flexion  (-) valgus at 0 and 30 degrees flexion  Neuro  - no sensory or motor deficit, grossly normal coordination, normal muscle tone      ASSESSMENT & PLAN  Ms. Alcantara is a 59 year old female following up with bilateral knee pain.    I reviewed her MR imaging in the room with her.  Her right knee MR reads:  1. Grade 4 patellofemoral chondromalacia. Medial compartment diffuse  cartilage thinning without full thickness cartilage loss. Intact  lateral compartment.  2. Free edge blunting of the posterior horn of the medial meniscus  with a small vertical tear at the junction with the posterior root.  3. Intact cruciate ligaments and medial/lateral supporting structures  and lateral meniscus.  Her left knee MR reads:  1. Grade 4 tricompartmental chondromalacia, most severe in the  patellofemoral compartment.  2. Horizontal tearing of the body and posterior horn of the medial  meniscus    I had a good discussion with Lety is centering around these MRs and their implications.  We did discuss the utility of repeat injections over time, both with corticosteroid and with hyaluronic acid.  I do think she would be a great candidate for hyaluronic acid treatment, we will start the preapproval  process for this.  I am also referring her again to physical therapy.    If she does well we can follow-up as needed for this and other issues.    It was a pleasure seeing Lety.        Jarret Pedroza DO, CAQSM      This note was constructed using Dragon dictation software, please excuse any minor errors in spelling, grammar, or syntax.

## 2024-11-01 ENCOUNTER — OFFICE VISIT (OUTPATIENT)
Dept: FAMILY MEDICINE | Facility: CLINIC | Age: 59
End: 2024-11-01
Attending: NURSE PRACTITIONER
Payer: OTHER GOVERNMENT

## 2024-11-01 ENCOUNTER — ORDERS ONLY (AUTO-RELEASED) (OUTPATIENT)
Dept: FAMILY MEDICINE | Facility: CLINIC | Age: 59
End: 2024-11-01

## 2024-11-01 VITALS
DIASTOLIC BLOOD PRESSURE: 72 MMHG | WEIGHT: 108.3 LBS | SYSTOLIC BLOOD PRESSURE: 116 MMHG | TEMPERATURE: 97 F | HEART RATE: 54 BPM | OXYGEN SATURATION: 100 % | BODY MASS INDEX: 23.36 KG/M2 | RESPIRATION RATE: 14 BRPM | HEIGHT: 57 IN

## 2024-11-01 DIAGNOSIS — F41.1 GAD (GENERALIZED ANXIETY DISORDER): ICD-10-CM

## 2024-11-01 DIAGNOSIS — Z12.11 SCREEN FOR COLON CANCER: ICD-10-CM

## 2024-11-01 DIAGNOSIS — R77.8 LOW SERUM COMPLEMENT C4: ICD-10-CM

## 2024-11-01 DIAGNOSIS — Z13.220 LIPID SCREENING: ICD-10-CM

## 2024-11-01 DIAGNOSIS — E06.3 HYPOTHYROIDISM DUE TO HASHIMOTO'S THYROIDITIS: ICD-10-CM

## 2024-11-01 DIAGNOSIS — Z00.00 ROUTINE GENERAL MEDICAL EXAMINATION AT A HEALTH CARE FACILITY: Primary | ICD-10-CM

## 2024-11-01 DIAGNOSIS — M25.50 MULTIPLE JOINT PAIN: ICD-10-CM

## 2024-11-01 DIAGNOSIS — Z13.1 SCREENING FOR DIABETES MELLITUS: ICD-10-CM

## 2024-11-01 DIAGNOSIS — Z78.0 POST-MENOPAUSAL: ICD-10-CM

## 2024-11-01 DIAGNOSIS — Z13.0 SCREENING FOR IRON DEFICIENCY ANEMIA: ICD-10-CM

## 2024-11-01 DIAGNOSIS — R56.9 SEIZURES (H): ICD-10-CM

## 2024-11-01 LAB
ALBUMIN SERPL BCG-MCNC: 4.5 G/DL (ref 3.5–5.2)
ALP SERPL-CCNC: 56 U/L (ref 40–150)
ALT SERPL W P-5'-P-CCNC: 19 U/L (ref 0–50)
ANION GAP SERPL CALCULATED.3IONS-SCNC: 12 MMOL/L (ref 7–15)
AST SERPL W P-5'-P-CCNC: 26 U/L (ref 0–45)
BILIRUB SERPL-MCNC: 0.3 MG/DL
BUN SERPL-MCNC: 15.3 MG/DL (ref 6–20)
CALCIUM SERPL-MCNC: 9.7 MG/DL (ref 8.8–10.4)
CHLORIDE SERPL-SCNC: 99 MMOL/L (ref 98–107)
CHOLEST SERPL-MCNC: 244 MG/DL
CREAT SERPL-MCNC: 0.69 MG/DL (ref 0.51–0.95)
EGFRCR SERPLBLD CKD-EPI 2021: >90 ML/MIN/1.73M2
ERYTHROCYTE [DISTWIDTH] IN BLOOD BY AUTOMATED COUNT: 12.4 % (ref 10–15)
FASTING STATUS PATIENT QL REPORTED: NO
FASTING STATUS PATIENT QL REPORTED: NO
GLUCOSE SERPL-MCNC: 84 MG/DL (ref 70–99)
HCO3 SERPL-SCNC: 25 MMOL/L (ref 22–29)
HCT VFR BLD AUTO: 41.4 % (ref 35–47)
HDLC SERPL-MCNC: 66 MG/DL
HGB BLD-MCNC: 13.3 G/DL (ref 11.7–15.7)
LDLC SERPL CALC-MCNC: 159 MG/DL
MCH RBC QN AUTO: 29.1 PG (ref 26.5–33)
MCHC RBC AUTO-ENTMCNC: 32.1 G/DL (ref 31.5–36.5)
MCV RBC AUTO: 91 FL (ref 78–100)
NONHDLC SERPL-MCNC: 178 MG/DL
PLATELET # BLD AUTO: 396 10E3/UL (ref 150–450)
POTASSIUM SERPL-SCNC: 4.1 MMOL/L (ref 3.4–5.3)
PROT SERPL-MCNC: 8 G/DL (ref 6.4–8.3)
RBC # BLD AUTO: 4.57 10E6/UL (ref 3.8–5.2)
SODIUM SERPL-SCNC: 136 MMOL/L (ref 135–145)
TRIGL SERPL-MCNC: 97 MG/DL
TSH SERPL DL<=0.005 MIU/L-ACNC: 1.58 UIU/ML (ref 0.3–4.2)
WBC # BLD AUTO: 5.9 10E3/UL (ref 4–11)

## 2024-11-01 PROCEDURE — 90471 IMMUNIZATION ADMIN: CPT | Performed by: NURSE PRACTITIONER

## 2024-11-01 PROCEDURE — 85027 COMPLETE CBC AUTOMATED: CPT | Performed by: NURSE PRACTITIONER

## 2024-11-01 PROCEDURE — 99396 PREV VISIT EST AGE 40-64: CPT | Mod: 25 | Performed by: NURSE PRACTITIONER

## 2024-11-01 PROCEDURE — 99214 OFFICE O/P EST MOD 30 MIN: CPT | Mod: 25 | Performed by: NURSE PRACTITIONER

## 2024-11-01 PROCEDURE — 36415 COLL VENOUS BLD VENIPUNCTURE: CPT | Performed by: NURSE PRACTITIONER

## 2024-11-01 PROCEDURE — 84443 ASSAY THYROID STIM HORMONE: CPT | Performed by: NURSE PRACTITIONER

## 2024-11-01 PROCEDURE — 86160 COMPLEMENT ANTIGEN: CPT | Performed by: NURSE PRACTITIONER

## 2024-11-01 PROCEDURE — 80053 COMPREHEN METABOLIC PANEL: CPT | Performed by: NURSE PRACTITIONER

## 2024-11-01 PROCEDURE — 90636 HEP A/HEP B VACC ADULT IM: CPT | Performed by: NURSE PRACTITIONER

## 2024-11-01 PROCEDURE — 80061 LIPID PANEL: CPT | Performed by: NURSE PRACTITIONER

## 2024-11-01 RX ORDER — LEVOTHYROXINE SODIUM 112 UG/1
112 TABLET ORAL DAILY
Qty: 90 TABLET | Refills: 0 | Status: SHIPPED | OUTPATIENT
Start: 2024-11-01

## 2024-11-01 RX ORDER — BUSPIRONE HYDROCHLORIDE 5 MG/1
5 TABLET ORAL 2 TIMES DAILY
Qty: 180 TABLET | Refills: 3 | Status: SHIPPED | OUTPATIENT
Start: 2024-11-01

## 2024-11-01 RX ORDER — PROGESTERONE 100 MG/1
100 CAPSULE ORAL DAILY
Qty: 90 CAPSULE | Refills: 0 | Status: SHIPPED | OUTPATIENT
Start: 2024-11-01

## 2024-11-01 RX ORDER — ESTRADIOL 1 MG/1
1 TABLET ORAL DAILY
Qty: 90 TABLET | Refills: 0 | Status: SHIPPED | OUTPATIENT
Start: 2024-11-01

## 2024-11-01 SDOH — HEALTH STABILITY: PHYSICAL HEALTH: ON AVERAGE, HOW MANY DAYS PER WEEK DO YOU ENGAGE IN MODERATE TO STRENUOUS EXERCISE (LIKE A BRISK WALK)?: 5 DAYS

## 2024-11-01 SDOH — HEALTH STABILITY: PHYSICAL HEALTH: ON AVERAGE, HOW MANY MINUTES DO YOU ENGAGE IN EXERCISE AT THIS LEVEL?: 60 MIN

## 2024-11-01 ASSESSMENT — PATIENT HEALTH QUESTIONNAIRE - PHQ9
10. IF YOU CHECKED OFF ANY PROBLEMS, HOW DIFFICULT HAVE THESE PROBLEMS MADE IT FOR YOU TO DO YOUR WORK, TAKE CARE OF THINGS AT HOME, OR GET ALONG WITH OTHER PEOPLE: NOT DIFFICULT AT ALL
SUM OF ALL RESPONSES TO PHQ QUESTIONS 1-9: 0
SUM OF ALL RESPONSES TO PHQ QUESTIONS 1-9: 0

## 2024-11-01 ASSESSMENT — PAIN SCALES - GENERAL: PAINLEVEL_OUTOF10: MILD PAIN (3)

## 2024-11-01 ASSESSMENT — SOCIAL DETERMINANTS OF HEALTH (SDOH): HOW OFTEN DO YOU GET TOGETHER WITH FRIENDS OR RELATIVES?: MORE THAN THREE TIMES A WEEK

## 2024-11-01 NOTE — PATIENT INSTRUCTIONS
Patient Education   Preventive Care Advice   This is general advice given by our system to help you stay healthy. However, your care team may have specific advice just for you. Please talk to your care team about your preventive care needs.  Nutrition  Eat 5 or more servings of fruits and vegetables each day.  Try wheat bread, brown rice and whole grain pasta (instead of white bread, rice, and pasta).  Get enough calcium and vitamin D. Check the label on foods and aim for 100% of the RDA (recommended daily allowance).  Lifestyle  Exercise at least 150 minutes each week  (30 minutes a day, 5 days a week).  Do muscle strengthening activities 2 days a week. These help control your weight and prevent disease.  No smoking.  Wear sunscreen to prevent skin cancer.  Have a dental exam and cleaning every 6 months.  Yearly exams  See your health care team every year to talk about:  Any changes in your health.  Any medicines your care team has prescribed.  Preventive care, family planning, and ways to prevent chronic diseases.  Shots (vaccines)   HPV shots (up to age 26), if you've never had them before.  Hepatitis B shots (up to age 59), if you've never had them before.  COVID-19 shot: Get this shot when it's due.  Flu shot: Get a flu shot every year.  Tetanus shot: Get a tetanus shot every 10 years.  Pneumococcal, hepatitis A, and RSV shots: Ask your care team if you need these based on your risk.  Shingles shot (for age 50 and up)  General health tests  Diabetes screening:  Starting at age 35, Get screened for diabetes at least every 3 years.  If you are younger than age 35, ask your care team if you should be screened for diabetes.  Cholesterol test: At age 39, start having a cholesterol test every 5 years, or more often if advised.  Bone density scan (DEXA): At age 50, ask your care team if you should have this scan for osteoporosis (brittle bones).  Hepatitis C: Get tested at least once in your life.  STIs (sexually  transmitted infections)  Before age 24: Ask your care team if you should be screened for STIs.  After age 24: Get screened for STIs if you're at risk. You are at risk for STIs (including HIV) if:  You are sexually active with more than one person.  You don't use condoms every time.  You or a partner was diagnosed with a sexually transmitted infection.  If you are at risk for HIV, ask about PrEP medicine to prevent HIV.  Get tested for HIV at least once in your life, whether you are at risk for HIV or not.  Cancer screening tests  Cervical cancer screening: If you have a cervix, begin getting regular cervical cancer screening tests starting at age 21.  Breast cancer scan (mammogram): If you've ever had breasts, begin having regular mammograms starting at age 40. This is a scan to check for breast cancer.  Colon cancer screening: It is important to start screening for colon cancer at age 45.  Have a colonoscopy test every 10 years (or more often if you're at risk) Or, ask your provider about stool tests like a FIT test every year or Cologuard test every 3 years.  To learn more about your testing options, visit:   .  For help making a decision, visit:   https://bit.ly/mk83708.  Prostate cancer screening test: If you have a prostate, ask your care team if a prostate cancer screening test (PSA) at age 55 is right for you.  Lung cancer screening: If you are a current or former smoker ages 50 to 80, ask your care team if ongoing lung cancer screenings are right for you.  For informational purposes only. Not to replace the advice of your health care provider. Copyright   2023 [employer]. All rights reserved. Clinically reviewed by the [facility] Transitions Program. [place] [id number] - REV 01/24.

## 2024-11-01 NOTE — PROGRESS NOTES
Preventive Care Visit  [facility]  Savanah Meléndez, GIULIANA, Nurse Practitioner Primary Care  Nov 1, 2024      Assessment & Plan     Routine general medical examination at a health care facility  Reviewed medical/social/family history and health maintenance   Twinrix given today, will do Shingrix through the pharmacy      Post-menopausal   Stable, tolerating med, no changes at this time  - estradiol (ESTRACE) 1 MG tablet; Take 1 tablet (1 mg) by mouth daily.  - progesterone (PROMETRIUM) 100 MG capsule; Take 1 capsule (100 mg) by mouth daily.    Multiple joint pain  Stable overall.  Will be following up with ortho for arthritis in bilateral knees.    - estradiol (ESTRACE) 1 MG tablet; Take 1 tablet (1 mg) by mouth daily.  - progesterone (PROMETRIUM) 100 MG capsule; Take 1 capsule (100 mg) by mouth daily.  - diclofenac (VOLTAREN) 1 % topical gel; Apply 4 g topically 4 times daily. Apply to hand and bilateral knees    Hypothyroidism due to Hashimoto's thyroiditis   Stable, tolerating med, no changes at this time  - levothyroxine (SYNTHROID/LEVOTHROID) 112 MCG tablet; Take 1 tablet (112 mcg) by mouth daily.  - TSH with free T4 reflex; Future    AMBROSE (generalized anxiety disorder)   Stable, tolerating med, no changes at this time  - sertraline (ZOLOFT) 50 MG tablet; Take 1 tablet (50 mg) by mouth daily.  - busPIRone (BUSPAR) 5 MG tablet; Take 1 tablet (5 mg) by mouth 2 times daily.    Seizures (H)  Diagnosed in her 20's- has not had any recent seizures, not on medication currently. Last seizure at 2009 when she quit drinking     Screening for iron deficiency anemia  - CBC with platelets; Future    Screening for diabetes mellitus  - Comprehensive metabolic panel (BMP + Alb, Alk Phos, ALT, AST, Total. Bili, TP); Future    Lipid screening  - Lipid panel reflex to direct LDL Non-fasting; Future    Screen for colon cancer  - MARBELLA([employer]); Future    Patient has been advised of split billing  requirements and indicates understanding: Yes        Counseling  Appropriate preventive services were addressed with this patient via screening, questionnaire, or discussion as appropriate for fall prevention, nutrition, physical activity, Tobacco-use cessation, social engagement, weight loss and cognition.  Checklist reviewing preventive services available has been given to the patient.  Reviewed patient's diet, addressing concerns and/or questions.           Sailaja Davidson is a 59 year old, presenting for the following:  Physical        11/1/2024     7:33 AM   Additional Questions   Roomed by Betsy gonzalez   Accompanied by self          HPI    Health Care Directive  Patient does not have a Health Care Directive: Discussed advance care planning with patient; however, patient declined at this time.      11/1/2024   General Health   How would you rate your overall physical health? Good   Feel stress (tense, anxious, or unable to sleep) To some extent      (!) STRESS CONCERN      11/1/2024   Nutrition   Three or more servings of calcium each day? Yes   Diet: Regular (no restrictions)   How many servings of fruit and vegetables per day? 4 or more   How many sweetened beverages each day? 0-1            11/1/2024   Exercise   Days per week of moderate/strenous exercise 5 days   Average minutes spent exercising at this level 60 min            11/1/2024   Social Factors   Frequency of gathering with friends or relatives More than three times a week   Worry food won't last until get money to buy more No   Food not last or not have enough money for food? No   Do you have housing? (Housing is defined as stable permanent housing and does not include staying ouside in a car, in a tent, in an abandoned building, in an overnight shelter, or couch-surfing.) Yes   Are you worried about losing your housing? No   Lack of transportation? No   Unable to get utilities (heat,electricity)? No            11/1/2024   Fall Risk   Fallen 2 or  more times in the past year? No    Trouble with walking or balance? No        Patient-reported          11/1/2024   Dental   Dentist two times every year? Yes            11/1/2024   TB Screening   Were you born outside of the [place]? No          Today's PHQ-9 Score:       11/1/2024     4:45 AM   PHQ-9 SCORE   PHQ-9 Total Score MyChart 0   PHQ-9 Total Score 0        Patient-reported         11/1/2024   Substance Use   Alcohol more than 3/day or more than 7/wk Not Applicable   Do you use any other substances recreationally? 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    Drug use: No           8/26/2024   LAST FHS-7 RESULTS   1st degree relative breast or ovarian cancer Yes   Any relative bilateral breast cancer No   Any male have breast cancer No   Any ONE woman have BOTH breast AND ovarian cancer No   Any woman with breast cancer before 50yrs No   2 or more relatives with breast AND/OR ovarian cancer No   2 or more relatives with breast AND/OR bowel cancer No           Mammogram Screening - Mammogram every 1-2 years updated in Health Maintenance based on mutual decision making        11/1/2024   STI Screening   New sexual partner(s) since last STI/HIV test? No        History of abnormal Pap smear: No - age 30- 64 PAP with HPV every 5 years recommended        Latest Ref Rng & Units 6/23/2021     7:25 AM 6/23/2021     7:16 AM 6/6/2018     8:30 AM   PAP / HPV   PAP (Historical)   NIL     HPV 16 DNA NEG^Negative Negative   Negative    HPV 18 DNA NEG^Negative Negative   Negative    Other HR HPV NEG^Negative Negative   Negative      ASCVD Risk   The 10-year ASCVD risk score (Tarik ZIMMERMAN, et al., 2019) is: 2.3%    Values used to calculate the score:      Age: 59 years      Sex: Female      Is Non- : No       "Diabetic: No      Tobacco smoker: No      Systolic Blood Pressure: 127 mmHg      Is BP treated: No      HDL Cholesterol: 60 mg/dL      Total Cholesterol: 208 mg/dL           Reviewed and updated as needed this visit by Provider                             Objective    Exam  LMP 04/06/2018 (Approximate)    Estimated body mass index is 23.09 kg/m  as calculated from the following:    Height as of 10/26/23: 1.448 m (4' 9\").    Weight as of 8/8/24: 48.4 kg (106 lb 11.2 oz).    Physical Exam  GENERAL: alert and no distress  EYES: Eyes grossly normal to inspection, PERRL and conjunctivae and sclerae normal  HENT: ear canals and TM's normal, nose and mouth without ulcers or lesions  NECK: no adenopathy, no asymmetry, masses, or scars  RESP: lungs clear to auscultation - no rales, rhonchi or wheezes  BREAST: normal without masses, tenderness or nipple discharge and no palpable axillary masses or adenopathy  CV: regular rate and rhythm, normal S1 S2, no S3 or S4, no murmur, click or rub, no peripheral edema  ABDOMEN: soft, nontender, no hepatosplenomegaly, no masses and bowel sounds normal  MS: no gross musculoskeletal defects noted, no edema  SKIN: no suspicious lesions or rashes  NEURO: Normal strength and tone, mentation intact and speech normal  PSYCH: mentation appears normal, affect normal/bright        Signed Electronically by: Savanah Meléndez DNP    "

## 2024-11-04 LAB — C4 SERPL-MCNC: 11 MG/DL (ref 13–39)

## 2024-11-11 ENCOUNTER — TELEPHONE (OUTPATIENT)
Dept: ORTHOPEDICS | Facility: CLINIC | Age: 59
End: 2024-11-11
Payer: OTHER GOVERNMENT

## 2024-11-11 NOTE — CONFIDENTIAL NOTE
Other: Patient called and is requesting injections at her 12/3/24 appointment. Please call patient if this does not work.     Could we send this information to you in [employer] or would you prefer to receive a phone call?:   Patient would prefer a phone call   Okay to leave a detailed message?: Yes at Cell number on file:    Telephone Information:   Mobile [phone]

## 2024-12-03 ENCOUNTER — OFFICE VISIT (OUTPATIENT)
Dept: ORTHOPEDICS | Facility: CLINIC | Age: 59
End: 2024-12-03
Payer: OTHER GOVERNMENT

## 2024-12-03 DIAGNOSIS — M17.0 BILATERAL PRIMARY OSTEOARTHRITIS OF KNEE: Primary | ICD-10-CM

## 2024-12-03 PROCEDURE — 20610 DRAIN/INJ JOINT/BURSA W/O US: CPT | Mod: 50 | Performed by: FAMILY MEDICINE

## 2024-12-03 RX ORDER — HYALURONATE SODIUM 10 MG/ML
20 SYRINGE (ML) INTRAARTICULAR
Status: COMPLETED | OUTPATIENT
Start: 2024-12-03 | End: 2024-12-03

## 2024-12-03 RX ADMIN — Medication 20 MG: at 14:01

## 2024-12-03 NOTE — PROGRESS NOTES
Lety is following up with bilateral knee osteoarthritis.  She is here for bilateral hyaluronic acid injections.      Large Joint Injection: bilateral knee    Date/Time: 12/3/2024 2:01 PM    Performed by: Jarret Pedroza DO  Authorized by: Jarret Pedroza DO    Indications:  Pain and osteoarthritis  Needle Size comment:  23G  Guidance: landmark guided    Approach:  Anterolateral  Location:  Knee  Laterality:  Bilateral      Medications (Right):  20 mg sodium hyaluronate 20 MG/2ML  Medications (Left):  20 mg sodium hyaluronate 20 MG/2ML  Outcome:  Tolerated well, no immediate complications  Procedure discussed: discussed risks, benefits, and alternatives    Consent Given by:  Patient  Timeout: timeout called immediately prior to procedure    Prep: patient was prepped and draped in usual sterile fashion       Knee Injection with Hyaluronic Acid    The patient was informed of the risks and the benefits of the procedure and a written consent was signed.    The patient's left knee was prepped with chlorhexidine in sterile fashion.   Euflexxa solution removed from packaging and inspected for consistency with regards to volume and packaging standard.  Injection was performed using sterile technique.  A 1.5-inch 22-gauge needle was used to enter the lateral aspect of the knee.  Injection performed without difficulty.    The patient's right knee was prepped with chlorhexidine in sterile fashion.   Euflexxa solution removed from packaging and inspected for consistency with regards to volume and packaging standard.  Injection was performed using sterile technique.  A 1.5-inch 22-gauge needle was used to enter the lateral aspect of the knee.  Injection performed without difficulty.    There were no complications. The patient tolerated the procedure well. There was negligible bleeding.   The patient was instructed to ice the knee upon leaving clinic and refrain from overuse over the next 3 days.   The patient was instructed  to call or go to the emergency room with any unusual pain, swelling, redness, or if otherwise concerned.

## 2024-12-03 NOTE — LETTER
12/3/2024      RE: Lety Alcantara  [address]  [facility] [address]     Dear Colleague,    Thank you for referring your patient, Lety Alcantara, to the [facility] [place]. Please see a copy of my visit note below.    Lety is following up with bilateral knee osteoarthritis.  She is here for bilateral hyaluronic acid injections.      Large Joint Injection: bilateral knee    Date/Time: 12/3/2024 2:01 PM    Performed by: Jarret Pedroza DO  Authorized by: Jarret Pedroza DO    Indications:  Pain and osteoarthritis  Needle Size comment:  23G  Guidance: landmark guided    Approach:  Anterolateral  Location:  Knee  Laterality:  Bilateral      Medications (Right):  20 mg sodium hyaluronate 20 MG/2ML  Medications (Left):  20 mg sodium hyaluronate 20 MG/2ML  Outcome:  Tolerated well, no immediate complications  Procedure discussed: discussed risks, benefits, and alternatives    Consent Given by:  Patient  Timeout: timeout called immediately prior to procedure    Prep: patient was prepped and draped in usual sterile fashion       Knee Injection with Hyaluronic Acid    The patient was informed of the risks and the benefits of the procedure and a written consent was signed.    The patient's left knee was prepped with chlorhexidine in sterile fashion.   Euflexxa solution removed from packaging and inspected for consistency with regards to volume and packaging standard.  Injection was performed using sterile technique.  A 1.5-inch 22-gauge needle was used to enter the lateral aspect of the knee.  Injection performed without difficulty.    The patient's right knee was prepped with chlorhexidine in sterile fashion.   Euflexxa solution removed from packaging and inspected for consistency with regards to volume and packaging standard.  Injection was performed using sterile technique.  A 1.5-inch 22-gauge needle was used to enter the lateral aspect of the knee.  Injection  performed without difficulty.    There were no complications. The patient tolerated the procedure well. There was negligible bleeding.   The patient was instructed to ice the knee upon leaving clinic and refrain from overuse over the next 3 days.   The patient was instructed to call or go to the emergency room with any unusual pain, swelling, redness, or if otherwise concerned.                 Again, thank you for allowing me to participate in the care of your patient.      Sincerely,    Jarret Pedroza DO

## 2024-12-03 NOTE — NURSING NOTE
[facility] [address] [address]  Dept: [phone]  ______________________________________________________________________________    Patient: Lety Alcantara   : 1965   MRN: [id number]   December 3, 2024    INVASIVE PROCEDURE SAFETY CHECKLIST    Date: December 3, 2024     Procedure:Bilateral Knee IA Euflexxa inj 1/3  Patient Name: Lety Alcantara  MRN: [id number]  YOB: 1965    Action: Complete sections as appropriate. Any discrepancy results in a HARD COPY until resolved.     PRE PROCEDURE:  Patient ID verified with 2 identifiers (name and  or MRN): Yes  Procedure and site verified with patient/designee (when able): Yes  Accurate consent documentation in medical record: Yes  H&P (or appropriate assessment) documented in medical record: Yes  H&P must be up to 20 days prior to procedure and updates within 24 hours of procedure as applicable: NA  Relevant diagnostic and radiology test results appropriately labeled and displayed as applicable: Yes  Procedure site(s) marked with provider initials: NA    TIMEOUT:  Time-Out performed immediately prior to starting procedure, including verbal and active participation of all team members addressing the following:Yes  * Correct patient identify  * Confirmed that the correct side and site are marked  * An accurate procedure consent form  * Agreement on the procedure to be done  * Correct patient position  * Relevant images and results are properly labeled and appropriately displayed  * The need to administer antibiotics or fluids for irrigation purposes during the procedure as applicable   * Safety precautions based on patient history or medication use    DURING PROCEDURE: Verification of correct person, site, and procedures any time the responsibility for care of the patient is transferred to another member of the care team.       Prior to injection, verified patient identity using  patient's name and date of birth.  Due to injection administration, patient instructed to remain in clinic for 15 minutes  afterwards, and to report any adverse reaction to me immediately.    Joint injection was performed.      Drug Amount Wasted:  None.  Vial/Syringe: Syringe  Expiration Date:  11/23/2025 11/30/2025      Megan Vicente ATC  December 3, 2024

## 2024-12-10 ENCOUNTER — OFFICE VISIT (OUTPATIENT)
Dept: ORTHOPEDICS | Facility: CLINIC | Age: 59
End: 2024-12-10
Payer: OTHER GOVERNMENT

## 2024-12-10 DIAGNOSIS — M17.0 BILATERAL PRIMARY OSTEOARTHRITIS OF KNEE: Primary | ICD-10-CM

## 2024-12-10 PROCEDURE — 20610 DRAIN/INJ JOINT/BURSA W/O US: CPT | Mod: 50 | Performed by: FAMILY MEDICINE

## 2024-12-10 RX ORDER — HYALURONATE SODIUM 10 MG/ML
20 SYRINGE (ML) INTRAARTICULAR
Status: COMPLETED | OUTPATIENT
Start: 2024-12-10 | End: 2024-12-10

## 2024-12-10 RX ADMIN — Medication 20 MG: at 15:05

## 2024-12-10 NOTE — NURSING NOTE
[facility] [address] [address]  Dept: [phone]  ______________________________________________________________________________    Patient: Lety Alcantara   : 1965   MRN: [id number]   December 10, 2024    INVASIVE PROCEDURE SAFETY CHECKLIST    Date: December 10, 2024   Procedure:Bilateral Knee Euflexxa 2/3  Patient Name: Lety Alcantara  MRN: [id number]  YOB: 1965    Action: Complete sections as appropriate. Any discrepancy results in a HARD COPY until resolved.     PRE PROCEDURE:  Patient ID verified with 2 identifiers (name and  or MRN): Yes  Procedure and site verified with patient/designee (when able): Yes  Accurate consent documentation in medical record: Yes  H&P (or appropriate assessment) documented in medical record: Yes  H&P must be up to 20 days prior to procedure and updates within 24 hours of procedure as applicable: Yes  Relevant diagnostic and radiology test results appropriately labeled and displayed as applicable: Yes  Procedure site(s) marked with provider initials: NA    TIMEOUT:  Time-Out performed immediately prior to starting procedure, including verbal and active participation of all team members addressing the following:Yes  * Correct patient identify  * Confirmed that the correct side and site are marked  * An accurate procedure consent form  * Agreement on the procedure to be done  * Correct patient position  * Relevant images and results are properly labeled and appropriately displayed  * The need to administer antibiotics or fluids for irrigation purposes during the procedure as applicable   * Safety precautions based on patient history or medication use    DURING PROCEDURE: Verification of correct person, site, and procedures any time the responsibility for care of the patient is transferred to another member of the care team.       Prior to injection, verified patient identity using patient's  name and date of birth.  Due to injection administration, patient instructed to remain in clinic for 15 minutes  afterwards, and to report any adverse reaction to me immediately.    Joint injection was performed.      Drug Amount Wasted:  None.  Vial/Syringe: Syringe  Expiration Date:  11/30/25      Kathryn Rivas ATC  December 10, 2024

## 2024-12-10 NOTE — PROGRESS NOTES
Lety is here for her 2nd of 3 Euflexxa injections.  She has bilateral knee osteoarthritis.    Large Joint Injection/Arthocentesis: bilateral knee    Date/Time: 12/10/2024 3:05 PM    Performed by: Jarret Pedroza DO  Authorized by: Jarret Pedroza DO    Indications:  Osteoarthritis  Needle Size:  22 G  Guidance: landmark guided    Approach:  Lateral  Location:  Knee  Laterality:  Bilateral      Medications (Right):  20 mg sodium hyaluronate 20 MG/2ML  Medications (Left):  20 mg sodium hyaluronate 20 MG/2ML  Outcome:  Tolerated well, no immediate complications  Procedure discussed: discussed risks, benefits, and alternatives    Consent Given by:  Patient  Timeout: timeout called immediately prior to procedure    Prep: patient was prepped and draped in usual sterile fashion         Knee Injection with Hyaluronic Acid    The patient was informed of the risks and the benefits of the procedure and a written consent was signed.    The patient's left knee was prepped with chlorhexidine in sterile fashion.   Euflexxa solution removed from packaging and inspected for consistency with regards to volume and packaging standard.  Injection was performed using sterile technique.  A 1.5-inch 22-gauge needle was used to enter the lateral aspect of the knee.  Injection performed without difficulty.    The patient's right knee was prepped with chlorhexidine in sterile fashion.   Euflexxa solution removed from packaging and inspected for consistency with regards to volume and packaging standard.  Injection was performed using sterile technique.  A 1.5-inch 22-gauge needle was used to enter the lateral aspect of the knee.  Injection performed without difficulty.    There were no complications. The patient tolerated the procedure well. There was negligible bleeding.   The patient was instructed to ice the knee upon leaving clinic and refrain from overuse over the next 3 days.   The patient was instructed to call or go to the  emergency room with any unusual pain, swelling, redness, or if otherwise concerned.

## 2024-12-17 ENCOUNTER — OFFICE VISIT (OUTPATIENT)
Dept: ORTHOPEDICS | Facility: CLINIC | Age: 59
End: 2024-12-17
Payer: OTHER GOVERNMENT

## 2024-12-17 DIAGNOSIS — M17.0 BILATERAL PRIMARY OSTEOARTHRITIS OF KNEE: Primary | ICD-10-CM

## 2024-12-17 PROCEDURE — 20610 DRAIN/INJ JOINT/BURSA W/O US: CPT | Mod: 50 | Performed by: FAMILY MEDICINE

## 2024-12-17 RX ADMIN — Medication 20 MG: at 16:29

## 2024-12-17 NOTE — PROGRESS NOTES
Large Joint Injection/Arthocentesis: bilateral knee    Date/Time: 12/17/2024 4:29 PM    Performed by: Jarret Pedroza DO  Authorized by: Jarret Pedroza DO    Indications:  Osteoarthritis  Needle Size:  22 G  Guidance: landmark guided    Approach:  Lateral  Location:  Knee  Laterality:  Bilateral      Medications (Right):  20 mg sodium hyaluronate 20 MG/2ML  Medications (Left):  20 mg sodium hyaluronate 20 MG/2ML  Outcome:  Tolerated well, no immediate complications  Procedure discussed: discussed risks, benefits, and alternatives    Consent Given by:  Patient  Timeout: timeout called immediately prior to procedure    Prep: patient was prepped and draped in usual sterile fashion         Knee Injection with Hyaluronic Acid    The patient was informed of the risks and the benefits of the procedure and a written consent was signed.    The patient's left knee was prepped with chlorhexidine in sterile fashion.   Euflexxa solution removed from packaging and inspected for consistency with regards to volume and packaging standard.  Injection was performed using sterile technique.  A 1.5-inch 22-gauge needle was used to enter the lateral aspect of the knee.  Injection performed without difficulty.    The patient's right knee was prepped with chlorhexidine in sterile fashion.   Euflexxa solution removed from packaging and inspected for consistency with regards to volume and packaging standard.  Injection was performed using sterile technique.  A 1.5-inch 22-gauge needle was used to enter the lateral aspect of the knee.  Injection performed without difficulty.    There were no complications. The patient tolerated the procedure well. There was negligible bleeding.   The patient was instructed to ice the knee upon leaving clinic and refrain from overuse over the next 3 days.   The patient was instructed to call or go to the emergency room with any unusual pain, swelling, redness, or if otherwise concerned.

## 2024-12-17 NOTE — NURSING NOTE
[facility] [address] [address]  Dept: [phone]  ______________________________________________________________________________    Patient: Lety Alcantara   : 1965   MRN: [id number]   2024    INVASIVE PROCEDURE SAFETY CHECKLIST    Date: 2024   Procedure:Bilateral knee Euflexxa 3/3  Patient Name: Lety Alcantara  MRN: [id number]  YOB: 1965    Action: Complete sections as appropriate. Any discrepancy results in a HARD COPY until resolved.     PRE PROCEDURE:  Patient ID verified with 2 identifiers (name and  or MRN): Yes  Procedure and site verified with patient/designee (when able): Yes  Accurate consent documentation in medical record: Yes  H&P (or appropriate assessment) documented in medical record: Yes  H&P must be up to 20 days prior to procedure and updates within 24 hours of procedure as applicable: Yes  Relevant diagnostic and radiology test results appropriately labeled and displayed as applicable: Yes  Procedure site(s) marked with provider initials: NA    TIMEOUT:  Time-Out performed immediately prior to starting procedure, including verbal and active participation of all team members addressing the following:Yes  * Correct patient identify  * Confirmed that the correct side and site are marked  * An accurate procedure consent form  * Agreement on the procedure to be done  * Correct patient position  * Relevant images and results are properly labeled and appropriately displayed  * The need to administer antibiotics or fluids for irrigation purposes during the procedure as applicable   * Safety precautions based on patient history or medication use    DURING PROCEDURE: Verification of correct person, site, and procedures any time the responsibility for care of the patient is transferred to another member of the care team.       Prior to injection, verified patient identity using patient's  name and date of birth.  Due to injection administration, patient instructed to remain in clinic for 15 minutes  afterwards, and to report any adverse reaction to me immediately.    Joint injection was performed.      Drug Amount Wasted:  None.  Vial/Syringe: Syringe  Expiration Date:  11/30/25      Kathryn Rivas, INOCENCIO  December 17, 2024

## 2024-12-17 NOTE — LETTER
12/17/2024      RE: Lety Alcantara  [address]  [facility] [address]     Dear Colleague,    Thank you for referring your patient, Lety Alcantara, to the [facility] [place]. Please see a copy of my visit note below.    Lety is here for her 3rd of 3 Euflexxa injections.    Large Joint Injection/Arthocentesis: bilateral knee    Date/Time: 12/17/2024 4:29 PM    Performed by: Jarret Pedroza DO  Authorized by: Jarret Pedroza DO    Indications:  Osteoarthritis  Needle Size:  22 G  Guidance: landmark guided    Approach:  Lateral  Location:  Knee  Laterality:  Bilateral      Medications (Right):  20 mg sodium hyaluronate 20 MG/2ML  Medications (Left):  20 mg sodium hyaluronate 20 MG/2ML  Outcome:  Tolerated well, no immediate complications  Procedure discussed: discussed risks, benefits, and alternatives    Consent Given by:  Patient  Timeout: timeout called immediately prior to procedure    Prep: patient was prepped and draped in usual sterile fashion         Knee Injection with Hyaluronic Acid    The patient was informed of the risks and the benefits of the procedure and a written consent was signed.    The patient's left knee was prepped with chlorhexidine in sterile fashion.   Euflexxa solution removed from packaging and inspected for consistency with regards to volume and packaging standard.  Injection was performed using sterile technique.  A 1.5-inch 22-gauge needle was used to enter the lateral aspect of the knee.  Injection performed without difficulty.    The patient's right knee was prepped with chlorhexidine in sterile fashion.   Euflexxa solution removed from packaging and inspected for consistency with regards to volume and packaging standard.  Injection was performed using sterile technique.  A 1.5-inch 22-gauge needle was used to enter the lateral aspect of the knee.  Injection performed without difficulty.    There were no complications. The patient  tolerated the procedure well. There was negligible bleeding.   The patient was instructed to ice the knee upon leaving clinic and refrain from overuse over the next 3 days.   The patient was instructed to call or go to the emergency room with any unusual pain, swelling, redness, or if otherwise concerned.                Again, thank you for allowing me to participate in the care of your patient.      Sincerely,    Jarret Pedroza, DO

## 2024-12-18 RX ORDER — HYALURONATE SODIUM 10 MG/ML
20 SYRINGE (ML) INTRAARTICULAR
Status: COMPLETED | OUTPATIENT
Start: 2024-12-17 | End: 2024-12-17

## 2025-01-16 DIAGNOSIS — M25.50 MULTIPLE JOINT PAIN: ICD-10-CM

## 2025-01-16 DIAGNOSIS — Z78.0 POST-MENOPAUSAL: ICD-10-CM

## 2025-01-16 RX ORDER — PROGESTERONE 100 MG/1
100 CAPSULE ORAL DAILY
Qty: 90 CAPSULE | Refills: 1 | Status: SHIPPED | OUTPATIENT
Start: 2025-01-16

## 2025-01-22 ENCOUNTER — TRANSFERRED RECORDS (OUTPATIENT)
Dept: HEALTH INFORMATION MANAGEMENT | Facility: CLINIC | Age: 60
End: 2025-01-22
Payer: OTHER GOVERNMENT

## 2025-01-23 ASSESSMENT — ACTIVITIES OF DAILY LIVING (ADL)
STAND: ACTIVITY IS NOT DIFFICULT
HOW_WOULD_YOU_RATE_THE_CURRENT_FUNCTION_OF_YOUR_KNEE_DURING_YOUR_USUAL_DAILY_ACTIVITIES_ON_A_SCALE_FROM_0_TO_100_WITH_100_BEING_YOUR_LEVEL_OF_KNEE_FUNCTION_PRIOR_TO_YOUR_INJURY_AND_0_BEING_THE_INABILITY_TO_PERFORM_ANY_OF_YOUR_USUAL_DAILY_ACTIVITIES?: 50
RISE FROM A CHAIR: ACTIVITY IS NOT DIFFICULT
AS_A_RESULT_OF_YOUR_KNEE_INJURY,_HOW_WOULD_YOU_RATE_YOUR_CURRENT_LEVEL_OF_DAILY_ACTIVITY?: NEARLY NORMAL
WALK: ACTIVITY IS MINIMALLY DIFFICULT
WEAKNESS: I DO NOT HAVE THE SYMPTOM
GIVING WAY, BUCKLING OR SHIFTING OF KNEE: I HAVE THE SYMPTOM BUT IT DOES NOT AFFECT MY ACTIVITY
HOW_WOULD_YOU_RATE_THE_OVERALL_FUNCTION_OF_YOUR_KNEE_DURING_YOUR_USUAL_DAILY_ACTIVITIES?: ABNORMAL
GIVING WAY, BUCKLING OR SHIFTING OF KNEE: I HAVE THE SYMPTOM BUT IT DOES NOT AFFECT MY ACTIVITY
GO UP STAIRS: ACTIVITY IS MINIMALLY DIFFICULT
KNEEL ON THE FRONT OF YOUR KNEE: ACTIVITY IS MINIMALLY DIFFICULT
KNEE_ACTIVITY_OF_DAILY_LIVING_SCORE: 74.29
PAIN: THE SYMPTOM AFFECTS MY ACTIVITY MODERATELY
LIMPING: I DO NOT HAVE THE SYMPTOM
SQUAT: ACTIVITY IS MINIMALLY DIFFICULT
GO UP STAIRS: ACTIVITY IS MINIMALLY DIFFICULT
SWELLING: THE SYMPTOM AFFECTS MY ACTIVITY MODERATELY
PAIN: THE SYMPTOM AFFECTS MY ACTIVITY MODERATELY
HOW_WOULD_YOU_RATE_THE_OVERALL_FUNCTION_OF_YOUR_KNEE_DURING_YOUR_USUAL_DAILY_ACTIVITIES?: ABNORMAL
RISE FROM A CHAIR: ACTIVITY IS NOT DIFFICULT
STAND: ACTIVITY IS NOT DIFFICULT
AS_A_RESULT_OF_YOUR_KNEE_INJURY,_HOW_WOULD_YOU_RATE_YOUR_CURRENT_LEVEL_OF_DAILY_ACTIVITY?: NEARLY NORMAL
HOW_WOULD_YOU_RATE_THE_CURRENT_FUNCTION_OF_YOUR_KNEE_DURING_YOUR_USUAL_DAILY_ACTIVITIES_ON_A_SCALE_FROM_0_TO_100_WITH_100_BEING_YOUR_LEVEL_OF_KNEE_FUNCTION_PRIOR_TO_YOUR_INJURY_AND_0_BEING_THE_INABILITY_TO_PERFORM_ANY_OF_YOUR_USUAL_DAILY_ACTIVITIES?: 50
WEAKNESS: I DO NOT HAVE THE SYMPTOM
GO DOWN STAIRS: ACTIVITY IS FAIRLY DIFFICULT
STIFFNESS: THE SYMPTOM AFFECTS MY ACTIVITY MODERATELY
STIFFNESS: THE SYMPTOM AFFECTS MY ACTIVITY MODERATELY
SWELLING: THE SYMPTOM AFFECTS MY ACTIVITY MODERATELY
RAW_SCORE: 52
GO DOWN STAIRS: ACTIVITY IS FAIRLY DIFFICULT
LIMPING: I DO NOT HAVE THE SYMPTOM
SQUAT: ACTIVITY IS MINIMALLY DIFFICULT
WALK: ACTIVITY IS MINIMALLY DIFFICULT
KNEE_ACTIVITY_OF_DAILY_LIVING_SUM: 52
SIT WITH YOUR KNEE BENT: ACTIVITY IS MINIMALLY DIFFICULT
PLEASE_INDICATE_YOR_PRIMARY_REASON_FOR_REFERRAL_TO_THERAPY:: KNEE
KNEEL ON THE FRONT OF YOUR KNEE: ACTIVITY IS MINIMALLY DIFFICULT
SIT WITH YOUR KNEE BENT: ACTIVITY IS MINIMALLY DIFFICULT

## 2025-01-27 ENCOUNTER — THERAPY VISIT (OUTPATIENT)
Dept: PHYSICAL THERAPY | Facility: CLINIC | Age: 60
End: 2025-01-27
Payer: OTHER GOVERNMENT

## 2025-01-27 DIAGNOSIS — M17.0 BILATERAL PRIMARY OSTEOARTHRITIS OF KNEE: ICD-10-CM

## 2025-01-27 PROCEDURE — 97110 THERAPEUTIC EXERCISES: CPT | Mod: GP | Performed by: PHYSICAL THERAPIST

## 2025-01-27 PROCEDURE — 97161 PT EVAL LOW COMPLEX 20 MIN: CPT | Mod: GP | Performed by: PHYSICAL THERAPIST

## 2025-01-27 PROCEDURE — 97530 THERAPEUTIC ACTIVITIES: CPT | Mod: GP | Performed by: PHYSICAL THERAPIST

## 2025-01-27 NOTE — PROGRESS NOTES
"PHYSICAL THERAPY EVALUATION  Type of Visit: Evaluation       Fall Risk Screen:  Fall screen completed by: PT  Have you fallen 2 or more times in the past year?: No  Have you fallen and had an injury in the past year?: No  Is patient a fall risk?: No    Subjective   KEY PT FINDINGS:  1) No effusion on either knee  2) Pain at end range flexion bilaterally (left > right)  3) Decreased proximal hip strength     Physical Therapy Initial Evaluation: Subjective History     Injury/Condition Details:  Presenting Complaint Bilateral knee pain   Onset Timing/Date December 2024 - last of Euflexxa injection.    Mechanism Had PT in 2021 and it was really helpful. Was not running, did a lot of exercises and decreased the pain.   Between 2021 - 2024 started running again. Spring 2024 was preparing for a trail [place], started to have more pain. Worked on strengthening with the , tried to continue to build up.   Ran 3 miles this morning, 5 or more and she feels \"gross\", tight, swollen and \"wrong\".     Had Euflexxa injections - not a ton of benefit.   Ice, strength with , sleeves after running, stretching, hates foam rolling,   Goal: run 10 miles again. Doesn't know if it is possible or not.      Symptom Behavior Details    Primary Symptoms Sporadic symptoms; Activity/position dependent, stiffness, catching/locking, weakness, Pain all around the knee. No localized to one specific aspect.    Worst Pain 7/10 (with see below)   Symptom Provocators Running   Stairs (up and down)    Minimal pain with walking.   Occasional pain at night.    Best Pain 0/10    Symptom Relievers Diclofenac (maybe decreased sharp pains)   Activity modification   Time of day dependent? Worse after activity   Recent symptom change? symptoms improving just a little bit since the injections     Prior Testing/Intervention for current condition:  Prior Tests  x-ray and MRI   Prior Treatment PT , Injections: Euflexxa (somehwat helpful), and Brace "     Lifestyle & General Medical History:  Employment Technician    Usual physical activities  (within past year) Trainer work outs 2x/week: body weight, running, stairs.   2x/week: gym work outs - strength training, HIIT (with Y[employer])  Running 1-2x/week.    Orthopaedic history See Epic Chart    Notable medical history See Epic Chart   Patient Reported Health excellent           Presenting condition or subjective complaint: knee pain and stiffness from osteoarthritis, uncomfortable doing activities I enjoy  Date of onset: 10/30/25    Relevant medical history: Arthritis; Depression; Menopause; Migraines or headaches; Osteoarthritis; Pain at night or rest; Seizures; Thyroid problems   Dates & types of surgery:      Prior diagnostic imaging/testing results: MRI; X-ray     Prior therapy history for the same diagnosis, illness or injury: Yes PT     Living Environment  Social support: With family members   Type of home: House; 2-story   Stairs to enter the home: Yes 14 Is there a railing: Yes     Ramp: No   Stairs inside the home: Yes 10 Is there a railing: Yes     Help at home: None  Equipment owned:       Employment: Yes accounts receivable technician  Hobbies/Interests: hiking, running, camping, reading, working out with friends    Patient goals for therapy: run more than 3 miles at a time, go up and down stairs with less pain, hike and/or run on challenging trails       Objective   Lower Extremity Physical Therapy Examination    Dynamic Movement Screen:  2 leg stance: equal weight bearing, normal appearing alignment.   2 leg squat:Reduced squat depth d/t reported pain at knee    1 leg stance: with mild increase in pain with FWB on single leg  Right: proprioceptive challenge  Left: proprioceptive challenge    Gait: Non-antalgic.     Knee Joint ROM: no significant loss of ROM, mild pain with OP into full flexion, L>R    Hip Joint ROM: deferred    Lower Extremity Muscle Strength (x/5)   Hip IR Hip ER Knee  Ext Hip ABD Hip Ext Knee Flex   Right  4/5 4/5 Pain/5 4/5 4/5 4+/5   Left 4/5 4/5 Pain/5 4/5 4/5 4+/5     Basic Muscle Activation:  Transversus Abdominus: NEXT  Quadriceps: Right: No lag, Left: No lag    Knee Joint Effusion (Stroke Test Assessment):  Right: None  Left: None    Lower Extremity Flexibility Screen:  Hamstrings (Supine SLR): Right: -; Left: -  Gastroc (Supine Active DF): Right: +; Left: +  Quadriceps (Prone KF): Right: +; Left: +  Hip Flexors (Henry Test): Right: -; Left: -  ITB (Della Test): Right: -; Left: -    Palpation:   Tender to palpation at the following structures: medial joint line, medial and lateral patellar boarders    Assessment & Plan   CLINICAL IMPRESSIONS  Medical Diagnosis: Bilateral Knee OA    Treatment Diagnosis: Bilateral knee pain   Impression/Assessment: Patient is a 59 year old female with bilateral knee complaints.  The following significant findings have been identified: Pain, Decreased ROM/flexibility, Decreased joint mobility, Decreased strength, Impaired balance, Impaired gait, Impaired muscle performance, and Decreased activity tolerance. These impairments interfere with their ability to perform self care tasks, recreational activities, household mobility, and community mobility as compared to previous level of function.     Clinical Decision Making (Complexity):  Clinical Presentation: Stable/Uncomplicated  Clinical Presentation Rationale: based on medical and personal factors listed in PT evaluation  Clinical Decision Making (Complexity): Low complexity    PLAN OF CARE  Treatment Interventions:  Modalities: Dry Needling  Interventions: Gait Training, Manual Therapy, Neuromuscular Re-education, Therapeutic Activity, Therapeutic Exercise    Long Term Goals     PT Goal 1  Goal Identifier: Stairs  Goal Description: Patient to have pain 2/10 or less ascending and descending stiars  Rationale: to maximize safety and independence within the community      Frequency of Treatment:  2x/month  Duration of Treatment: 3 months    Recommended Referrals to Other Professionals: Physical Therapy  Education Assessment:   Learner/Method: Patient;No Barriers to Learning    Risks and benefits of evaluation/treatment have been explained.   Patient/Family/caregiver agrees with Plan of Care.     Evaluation Time:     PT Eval, Low Complexity Minutes (87104): 19     Signing Clinician: Denice Wasserman PT

## 2025-02-03 NOTE — LETTER
12/10/2024      RE: Lety Alcantara  [address]  [facility] [address]     Dear Colleague,    Thank you for referring your patient, Lety Alcantara, to the [facility] [place]. Please see a copy of my visit note below.    Lety is here for her 2nd of 3 Euflexxa injections.  She has bilateral knee osteoarthritis.    Large Joint Injection/Arthocentesis: bilateral knee    Date/Time: 12/10/2024 3:05 PM    Performed by: Jarret Pedroza DO  Authorized by: Jarret Pedroza DO    Indications:  Osteoarthritis  Needle Size:  22 G  Guidance: landmark guided    Approach:  Lateral  Location:  Knee  Laterality:  Bilateral      Medications (Right):  20 mg sodium hyaluronate 20 MG/2ML  Medications (Left):  20 mg sodium hyaluronate 20 MG/2ML  Outcome:  Tolerated well, no immediate complications  Procedure discussed: discussed risks, benefits, and alternatives    Consent Given by:  Patient  Timeout: timeout called immediately prior to procedure    Prep: patient was prepped and draped in usual sterile fashion         Knee Injection with Hyaluronic Acid    The patient was informed of the risks and the benefits of the procedure and a written consent was signed.    The patient's left knee was prepped with chlorhexidine in sterile fashion.   Euflexxa solution removed from packaging and inspected for consistency with regards to volume and packaging standard.  Injection was performed using sterile technique.  A 1.5-inch 22-gauge needle was used to enter the lateral aspect of the knee.  Injection performed without difficulty.    The patient's right knee was prepped with chlorhexidine in sterile fashion.   Euflexxa solution removed from packaging and inspected for consistency with regards to volume and packaging standard.  Injection was performed using sterile technique.  A 1.5-inch 22-gauge needle was used to enter the lateral aspect of the knee.  Injection performed without  Contacted mom with the following information from PCP:     Michele's lead level is improving.  We can check again next year or sooner if any concerns.      Mom Verbalizes understanding and agreeable to plan of care.      difficulty.    There were no complications. The patient tolerated the procedure well. There was negligible bleeding.   The patient was instructed to ice the knee upon leaving clinic and refrain from overuse over the next 3 days.   The patient was instructed to call or go to the emergency room with any unusual pain, swelling, redness, or if otherwise concerned.                Again, thank you for allowing me to participate in the care of your patient.      Sincerely,    Jarret Pedroza DO

## 2025-03-17 NOTE — PROGRESS NOTES
Virtual Visit Details    Type of service:  Video Visit     Originating Location (pt. Location): Home  Distant Location (provider location):  On-site  Platform used for Video Visit: [facility] [place]  Will patient be in [place] for the visit?  Yes  How would patient like to enter the video visit? Xiharjanie  How would patient like to obtain your AVS? Kyle      Rheumatology Clinic Visit  [facility]  MARTHA Hobson     Lety Alcantara MRN# [id number]   YOB: 1965 Age: 60 year old   Date of Visit: 3/19/2025  Primary care provider: Savanah Meléndez          Assessment and Plan:     1.  Multiple joint pain  2.  Positive LEONARDO  3.  Raynaud's    Patient presents today for follow-up.  She states that overall she is doing well.  She is been working with orthopedics regarding her knee pain.  She is doing physical therapy and utilizing diclofenac gel which does help.  She did try the cortisone injections but unfortunately they did not provide any lasting benefit.  She states overall Raynaud's was pretty well-controlled.  She did have a few chilblains this monitor but nothing significant.  She denies any nosebleeds or sinus congestion.  No open sores or ulcerations.  No skin rashes.  Her C4 level was still slightly low when we rechecked it.  Will recheck her C3 at her C4 level at her next convenience.  If these are normal okay for her to follow-up with rheumatology as needed.  If 1 or both are low we may want to consider follow-up at least yearly.  I will contact the patient with the results of those blood work when it is done.     Plan:       Follow-up with Marguerite Bailey PA-C either in 1 year or as needed.  Will depend on results of the blood work.  Labs: C3 and C4 level at your convenience    MARTHA Hobson  Rheumatology         History of Present Illness:   Lety Alcantara presents for evaluation of joint pain.      Interval history March 19, 2025:  She states that things are overall good. She  has been going to PT for her knees. She uses diclofenac as well. She has met with orthopedics. She tried the cortisone shots, they did not last long. They recommend waiting as long as she can for surgery.     Raynaud's was not too bad. Her hands get cold when she goes outside. No open sores or ulcerations. She does get chilblains on her toes, but they were not too bad this year. No other skin rashes or open sores. No nose bleeds.     HPI from consult of August 8, 2024:  She states that she has OA in her knees. She states that she will get episodes where they hurt and will swell up. This will then resolve and then come back. Sometimes she will get low back pain and occasionally pain in her toes and hands. Grandmother with psoriatic arthritis and psoriasis. She states that stuff she has read online, this has caused her to question her diagnosis. She states that she has had toenail issues for years and was told that it is not a fungus. She does get chilblains and was told that the testing for lupus was negative. She states that she likes to run that the knee pain can prohibit this. No mouth sores. She used to get red itchy and flaky patches on her elbows. Father with psoriasis so she thought it was that, but she never had it evaluated. She has had hair loss, she will notice this more when her joint pain is worse. No dry eyes or dry mouth. History of seizures from age 22-40's. She was on various medications. She states they were epileptic seizures that stopped and she was able to taper off medications. She states that she has hashimoto's. No unexplained fevers or weight loss. No history of delirium or psychosis. No history of pericarditis or pleural effusion. She does report raynaud's. No shortness of breath. No trouble swallowing food/medications.     Sister with thyroid disorder.          Review of Systems:     Constitutional: negative  Skin: as above  Eyes: negative  Ears/Nose/Throat: negative  Respiratory: No  shortness of breath, dyspnea on exertion, cough, or hemoptysis  Cardiovascular: negative  Gastrointestinal: negative  Genitourinary: negative  Musculoskeletal: as above  Neurologic: negative  Psychiatric: negative  Hematologic/Lymphatic/Immunologic: negative  Endocrine: negative         Active Problem List:     Patient Active Problem List    Diagnosis Date Noted    Mild episode of recurrent major depressive disorder 10/20/2022     Priority: Medium    Mechanical breakdown of intrauterine contraceptive device, initial encounter 2021     Priority: Medium     Added automatically from request for surgery [id number]      Right-sided low back pain with right-sided sciatica, unspecified chronicity 2019     Priority: Medium    Seizures (H) 2018     Priority: Medium    Hypothyroidism due to Hashimoto's thyroiditis 2018     Priority: Medium    IUD (intrauterine device) in place, Paragard 2018     Priority: Medium    Mood swings 05/15/2015     Priority: Medium            Past Medical History:     Past Medical History:   Diagnosis Date    Arthritis 2017    Depressive disorder 2000    Epilepsy (H) 2000    Hypothyroidism 1995    Hypothyroidism due to Hashimoto's thyroiditis 2018     Past Surgical History:   Procedure Laterality Date     SECTION  01    OPERATIVE HYSTEROSCOPY N/A 2021    Procedure: HYSTEROSCOPIC removal of intrauterine device;  Surgeon: Che Pizano MD;  Location:  OR            Social History:     Social History     Socioeconomic History    Marital status: Single     Spouse name: Not on file    Number of children: Not on file    Years of education: Not on file    Highest education level: Not on file   Occupational History    Not on file   Tobacco Use    Smoking status: Never    Smokeless tobacco: Never   Vaping Use    Vaping status: Never Used   Substance and Sexual Activity    Alcohol use: Not Currently     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   Other Topics Concern    Parent/sibling w/ CABG, MI or angioplasty before 65F 55M? No   Social History Narrative    Not on file     Social Drivers of Health     Financial Resource Strain: Low Risk  (11/1/2024)    Financial Resource Strain     Within the past 12 months, have you or your family members you live with been unable to get utilities (heat, electricity) when it was really needed?: No   Food Insecurity: Low Risk  (11/1/2024)    Food Insecurity     Within the past 12 months, did you worry that your food would run out before you got money to buy more?: No     Within the past 12 months, did the food you bought just not last and you didn t have money to get more?: No   Transportation Needs: Low Risk  (11/1/2024)    Transportation Needs     Within the past 12 months, has lack of transportation kept you from medical appointments, getting your medicines, non-medical meetings or appointments, work, or from getting things that you need?: No   Physical Activity: Sufficiently Active (11/1/2024)    Exercise Vital Sign     Days of Exercise per Week: 5 days     Minutes of Exercise per Session: 60 min   Stress: Stress Concern Present (11/1/2024)    [place] [employer] - Occupational Stress Questionnaire     Feeling of Stress : To some extent   Social Connections: Unknown (11/1/2024)    Social Connection and Isolation Panel [NHANES]     Frequency of Communication with Friends and Family: Not on file     Frequency of Social Gatherings with Friends and Family: More than three times a week     Attends Yazidism Services: Not on file     Active Member of Clubs or Organizations: Not on file     Attends Club or Organization Meetings: Not on file     Marital Status: Not on file   Interpersonal Safety: Low Risk  (1/27/2025)    Interpersonal Safety     Do you feel physically and emotionally safe where you currently  "live?: Yes     Within the past 12 months, have you been hit, slapped, kicked or otherwise physically hurt by someone?: No     Within the past 12 months, have you been humiliated or emotionally abused in other ways by your partner or ex-partner?: No   Housing Stability: Low Risk  (11/1/2024)    Housing Stability     Do you have housing? : Yes     Are you worried about losing your housing?: No          Family History:     Family History   Problem Relation Age of Onset    Diabetes Brother     Coronary Artery Disease Father     Hypertension Father     Hyperlipidemia Father     Depression Father     Hypertension Mother     Hyperlipidemia Mother     Breast Cancer Mother         over 70 when had breast cancer    Other Cancer Mother     Depression Sister     Anxiety Disorder Sister     Depression Daughter     Anxiety Disorder Daughter     Asthma Daughter     Thyroid Disease Sister             Allergies:     Allergies   Allergen Reactions    Molds & Smuts Anaphylaxis    Seafood Anaphylaxis    Sulfa Antibiotics Rash            Medications:     Current Outpatient Medications   Medication Sig Dispense Refill    busPIRone (BUSPAR) 5 MG tablet Take 1 tablet (5 mg) by mouth 2 times daily. 180 tablet 3    diclofenac (VOLTAREN) 1 % topical gel Apply 4 g topically 4 times daily. Apply to hand and bilateral knees 150 g 3    estradiol (ESTRACE) 1 MG tablet Take 1 tablet (1 mg) by mouth daily. 90 tablet 0    levothyroxine (SYNTHROID/LEVOTHROID) 112 MCG tablet TAKE 1 TABLET BY MOUTH DAILY 90 tablet 2    progesterone (PROMETRIUM) 100 MG capsule TAKE 1 CAPSULE BY MOUTH EVERY DAY 90 capsule 1    sertraline (ZOLOFT) 50 MG tablet Take 1 tablet (50 mg) by mouth daily. 90 tablet 2            Physical Exam:   Height 1.438 m (4' 8.6\"), weight 49 kg (108 lb), last menstrual period 04/06/2018, not currently breastfeeding.  Wt Readings from Last 6 Encounters:   11/01/24 49.1 kg (108 lb 4.8 oz)   08/08/24 48.4 kg (106 lb 11.2 oz)   10/26/23 48.5 kg " (107 lb)   10/20/22 49.4 kg (109 lb)   08/31/22 49.4 kg (109 lb)   08/11/21 49 kg (108 lb)     Constitutional: well-developed, appearing stated age; cooperative  Eyes: nl conjunctiva, sclera  ENT: nl external ears, nose, hearing, lips, teeth  Neck: no visible mass or thyroid enlargement  Resp: No shortness of breath with normal conversation  Psych: nl judgement, orientation, memory, affect.           Data:   Imaging:  MRI SI joints 08/27/2019  IMPRESSION:   1. Mild osteoarthritis of the sacroiliac joints, right greater than left.   2. No evidence of sacroiliitis.   3. Mild osteoarthritis of the right hip joint.     08/11/2021 xray knees  IMPRESSION:   1. Narrowing medial aspect patellofemoral joint left worse than right.  2. Mild narrowing lateral femoral tibial joint space on the right.  3. Small knee effusion on the right.    Xray hands 02/08/2022  Impression:  1. No acute osseous abnormality.  2. Mild to moderate degenerative changes the first metacarpal and  triscaphe joints, left greater than right.    MRI Right knee 10/28/2024  Impression:  1. Grade 4 patellofemoral chondromalacia. Medial compartment diffuse  cartilage thinning without full thickness cartilage loss. Intact  lateral compartment.     2. Free edge blunting of the posterior horn of the medial meniscus  with a small vertical tear at the junction with the posterior root.     3. Intact cruciate ligaments and medial/lateral supporting structures  and lateral meniscus.    MRI left knee 08/29/2024  Impression:     1. Grade 4 tricompartmental chondromalacia, most severe in the  patellofemoral compartment.     2. Horizontal tearing of the body and posterior horn of the medial  meniscus.      Laboratory:  8/18/2022  Creatinine 0.65, GFR greater than 90  Albumin 3.8  ALT 19, AST 19  CRP less than 2.9  Rheumatoid factor less than 6  Vitamin D 55  White blood cell count 3.9, hemoglobin 13.0, platelet count 300  Sed rate 10  Lyme disease negative  LEONARDO positive  with a homogenous pattern and a titer of 1:160    10/26/2023  Hepatitis B nonreactive  White blood cell count 9.1, hemoglobin 13.3, platelet count 339  TSH 1.71  TTG antibodies negative  ALT 16, AST 28  Albumin 4.4  Creatinine 0.64, GFR greater than 90    8/8/2024  Cardiolipin antibodies negative  CRP less than 3.0  CCP antibody 1.5  Rheumatoid factor less than 10  Sed rate 15  Lupus anticoagulant negative  Urine negative for protein  It is a glycoprotein antibodies negative  C3 96, C4 10  Double-stranded DNA 5.7  RNP MRD negative  SCL 70 negative  Smith antibody negative  SSA and SSB negative    11/1/2024  C4 11

## 2025-03-19 ENCOUNTER — VIRTUAL VISIT (OUTPATIENT)
Dept: RHEUMATOLOGY | Facility: CLINIC | Age: 60
End: 2025-03-19
Payer: OTHER GOVERNMENT

## 2025-03-19 VITALS — BODY MASS INDEX: 23.3 KG/M2 | WEIGHT: 108 LBS | HEIGHT: 57 IN

## 2025-03-19 DIAGNOSIS — R77.8 LOW SERUM COMPLEMENT C4: Primary | ICD-10-CM

## 2025-03-19 PROCEDURE — 98005 SYNCH AUDIO-VIDEO EST LOW 20: CPT | Performed by: PHYSICIAN ASSISTANT

## 2025-03-19 ASSESSMENT — PAIN SCALES - GENERAL: PAINLEVEL_OUTOF10: MILD PAIN (3)

## 2025-03-19 NOTE — PATIENT INSTRUCTIONS
After Visit Instructions:     Thank you for coming to [facility] for your care. It is my goal to partner with you to help you reach your optimal state of health.       Plan:     Follow-up with Marguerite Bailey PA-C either in 1 year or as needed.  Will depend on results of the blood work.  Labs: C3 and C4 level at your convenience    Marguerite Bailey PA-C  [facility]  [facility]    Contact information: [facility]  Clinic Number:  [phone]  Please call or send a [employer] message with any questions about your care

## 2025-04-13 ENCOUNTER — LAB (OUTPATIENT)
Dept: LAB | Facility: CLINIC | Age: 60
End: 2025-04-13
Payer: OTHER GOVERNMENT

## 2025-04-13 DIAGNOSIS — R77.8 LOW SERUM COMPLEMENT C4: ICD-10-CM

## 2025-04-13 PROCEDURE — 36415 COLL VENOUS BLD VENIPUNCTURE: CPT

## 2025-04-13 PROCEDURE — 86160 COMPLEMENT ANTIGEN: CPT

## 2025-04-14 DIAGNOSIS — Z78.0 POST-MENOPAUSAL: ICD-10-CM

## 2025-04-14 DIAGNOSIS — M25.50 MULTIPLE JOINT PAIN: ICD-10-CM

## 2025-04-14 LAB
C3 SERPL-MCNC: 92 MG/DL (ref 81–157)
C4 SERPL-MCNC: 9 MG/DL (ref 13–39)

## 2025-04-14 RX ORDER — ESTRADIOL 1 MG/1
1 TABLET ORAL DAILY
Qty: 90 TABLET | Refills: 1 | Status: SHIPPED | OUTPATIENT
Start: 2025-04-14

## 2025-04-19 ENCOUNTER — MYC MEDICAL ADVICE (OUTPATIENT)
Dept: FAMILY MEDICINE | Facility: CLINIC | Age: 60
End: 2025-04-19
Payer: OTHER GOVERNMENT

## 2025-04-19 DIAGNOSIS — Z78.0 POST-MENOPAUSAL: Primary | ICD-10-CM

## 2025-04-21 NOTE — TELEPHONE ENCOUNTER
"Savanah -- looks like patient is post-menopausal and this has been ordered before but  before was completed. Please advise. Pended    \"Hello! I would like to schedule a bone density scan. Do I need a referral for that? And who do I call to schedule it? Thank you!\"    Nuha Vuong RN  [facility]    "

## 2025-05-07 ENCOUNTER — MYC MEDICAL ADVICE (OUTPATIENT)
Dept: ORTHOPEDICS | Facility: CLINIC | Age: 60
End: 2025-05-07
Payer: OTHER GOVERNMENT

## 2025-05-12 NOTE — TELEPHONE ENCOUNTER
5/12 Called and left voicemail, provided phone number [phone] to schedule a consult with Dr. Lemons.     [facility]   Orthopedics, Podiatry, Sports Medicine, Ent ,Eye , Audiology, Adult Endocrine & Diabetes, Nutrition & Medication Therapy Management Specialties   [facility]

## 2025-05-13 NOTE — TELEPHONE ENCOUNTER
DIAGNOSIS: BILATERAL KNEE   APPOINTMENT DATE: 05/16/2025   NOTES STATUS DETAILS   OFFICE NOTE from referring provider Internal 05/07/2025 - My C Message    12/17/2024 - Jarret Pedroza,  - [facility]   OFFICE NOTE from other specialist Internal 1/27/2025  [facility]   MRI PACS Internal:  10/28/2024 - RT Knee  08/29/2024 - LT Knee   XRAYS (IMAGES & REPORTS) PACS Internal:  08/11/2021 - Bilateral Knee

## 2025-05-16 ENCOUNTER — PRE VISIT (OUTPATIENT)
Dept: ORTHOPEDICS | Facility: CLINIC | Age: 60
End: 2025-05-16

## 2025-05-22 DIAGNOSIS — M25.562 BILATERAL KNEE PAIN: Primary | ICD-10-CM

## 2025-05-22 DIAGNOSIS — M25.561 BILATERAL KNEE PAIN: Primary | ICD-10-CM

## 2025-05-22 NOTE — PROGRESS NOTES
"    [facility]  Orthopaedic Surgery Consultation by Pedro Lemons M.D.    Lety Alcantara MRN# [id number]   Age: 60 year old YOB: 1965     Requesting physician: Savanah Gilmore     Background history:  DX:  Right-sided low back pain with sciatica  Seizures  Hypothyroidism    TREATMENTS:  None           History of Present Illness:   60 year old female who presents our clinic for the evaluation of bilateral chronic knee pain left> right.  This pain has been present for multiple years and has become progressive over time.  No clear antecedent traumatic event.  Pain is felt on the anterior and medial side of the knee.  It is during activities such as kneeling, squatting, lunging, navigating stairs.  But it is also painful when walking.  Patient is an avid runner and can no longer do this because of her pain.  Patient reports occasional night pain.  She does endorse the presence of initiation stiffness and soreness.  She does not report significant effusions.  She reports crepitus and a sensation of instability.  She states that her symptoms are greatly affecting her activity level and quality of life.  To mitigate her pain she has tried over-the-counter analgesics.  She does not tolerate NSAIDs very well.  She has tried physical therapy, activity modification and has had injections with both cortisone and hyaluronic acid with which did not provide significant or long-term relief.    Social:   Occupation: Works for [employer]   Living situation: sister and daughter   Hobbies / Sports: working out, running, hiking.    Smoking: No  Alcohol: No  Illicit drug use: No         Physical Exam:     EXAMINATION pertinent findings:   PSYCH: Pleasant, healthy-appearing, alert, oriented x3, cooperative. Normal mood and affect.  VITAL SIGNS: Height 1.448 m (4' 9\"), weight 49.4 kg (109 lb), last menstrual period 04/06/2018, not currently breastfeeding.  Reviewed nursing intake notes.   Body " mass index is 23.59 kg/m .  RESP: non labored breathing   ABD: benign, soft, non-tender, no acute peritoneal findings  SKIN: grossly normal   LYMPHATIC: grossly normal, no adenopathy, no extremity edema  NEURO: grossly normal , no motor deficits  VASCULAR: satisfactory perfusion of all extremities   MUSCULOSKELETAL:   Alignment: Neutral  Gait: Normal    Bilateral hips exhibited full range of motion.  No pain upon rotations.  This excess negative.  No tenderness palpation over greater trochanteric region.      L knee: -0-0 .  Deep flexion is recognizably painful.  Straight leg raise +. No redness, warmth or skin changes present. Effusion No. Ligamentously stable in both ML and AP direction. Normal PF tracking with crepitus.  Rabot's recognizably painful.  Apprehension -. Meniscal provocation tests are negative.  There is recognizable tenderness to palpation over the medial joint line.     R knee: -0-0 .  Deep flexion is recognizably painful.  Straight leg raise +. No redness, warmth or skin changes present. Effusion No. Ligamentously stable in both ML and AP direction. Normal PF tracking with crepitus.  Rabot's recognizably painful.  Apprehension -. Meniscal provocation tests are negative.  There is recognizable tenderness to palpation over the medial joint line.     Bilateral LE:   Thigh and leg compartments soft and compressible   +Quad/TA/GSC/FHL/EHL   SILT DP/SP/Hollis/Saph/Tib nerve distributions   Palpable dorsalis pedis pulse            Data:   All laboratory data reviewed  All imaging studies reviewed by me personally.    XR alignment films 6/11/2025:  My interpretation: Slight varus alignment of bilateral lower extremities.    XR bilateral knees 6/11/2025:  My interpretation: End-stage osteoarthritic changes of patellofemoral joints with near complete obliteration of joint space, presence of marginal osteophytes, sclerosis and subchondral cysts.  Moderate to severe osteoarthritic change of medial  compartment with significant joint space narrowing, presence of marginal osteophytes and subchondral cyst.  Mild osteoarthritic change of lateral compartments.    MRI knee left 8/29/2024:  My interpretation: End-stage osteoarthritic changes of patellofemoral joint with grade IV chondromalacia, associated subchondral cyst and bone marrow edema on both trochlear and patellar side.  Significant osteoarthritic changes of the medial compartment with subchondral cysts and associated bone marrow edema.  Grade III-IV chondromalacia.  Intact ACL/PCL.    MRI knee right 10/28/2024:  My interpretation: End-stage osteoarthritic changes of patellofemoral joint with grade IV chondromalacia, associated subchondral cyst and bone marrow edema on both trochlear and patellar side.  Significant osteoarthritic changes of the medial compartment with subchondral cysts and associated bone marrow edema.  Grade III-IV chondromalacia.  Intact ACL/PCL.         Assessment and Plan:   Assessment:  60-year-old female presenting with chronic bilateral knee pain left> right due to end-stage osteoarthritic change in patellofemoral joint and moderate osteoarthritic change and tibiofemoral joint.  Insufficiently responding to nonsurgical treatment options.    Plan:  I had a long discussion with the patient regarding etiology and ongoing management options.  Reviewed surgical and nonsurgical treatments.  The non-surgical options include activity modification, pain medication, PT, bracing and injection therapy.  At this point patient has exhausted all nonsurgical treatment options.  As for surgery we discussed the options of partial and total knee replacement surgery.  Given the osteoarthritic changes visualized on MRI and tibiofemoral joint and associated bone marrow edema perhaps the optimal choice would be to proceed with knee replacement surgery rather than patellofemoral arthroplasty.  We reviewed total knee replacement in detail including the  procedure, the implants, the recovery process, and long-term outcomes.  We reviewed that the risks of the surgery include but are not limited to infection, wound problems, stiffness, persistent pain, swelling, clicking, loosening, revision surgery.  We also reviewed less common risks such as neurovascular injury fracture, and other implant-related issues.  We reviewed other medical complications such as a blood clot.  We discussed that the vast majority of cases have a highly successful outcome.  However there is a small subset of patients that do experience complications or problems following the knee replacement and these problems can be very debilitating and painful and sometimes do not improve.  We discussed that knee replacement surgery will not guarantee her being able to run in the future.  Based on a discussion of the risks and benefits, we believe that the benefits far outweigh the risks at this point and the patient would like to proceed with left total knee replacement surgery.  We will work on scheduling surgery at a time that works well for patient in the next few months.  Patient will contact us if there are any questions or concerns leading up to surgery. Before surgery the patient will attend a joint replacement class and will be seen by the PCP/anesthesiologist and dentist.    Patient is a candidate for fast-track total knee arthroplasty candidate.       For additional information and frequently asked questions regarding joint replacements, scan the QR code image below on your phone camera or go to: [email or web address]             Thank you for your referral.    Pedro Lemons MD, PhD     Adult Reconstruction  [facility] Department of Orthopaedic Surgery    This note was created using dictation software and may contain errors.  Please contact the creator for any clarifications that are needed.    DATA for  DOCUMENTATION:         Past Medical History:     Patient Active Problem List   Diagnosis    Mood swings    Seizures (H)    Hypothyroidism due to Hashimoto's thyroiditis    IUD (intrauterine device) in place, Paragard    Right-sided low back pain with right-sided sciatica, unspecified chronicity    Mechanical breakdown of intrauterine contraceptive device, initial encounter    Mild episode of recurrent major depressive disorder     Past Medical History:   Diagnosis Date    Arthritis 2017    Depressive disorder     Epilepsy (H)     Hypothyroidism     Hypothyroidism due to Hashimoto's thyroiditis 2018       Also see scanned health assessment forms.       Past Surgical History:     Past Surgical History:   Procedure Laterality Date     SECTION  01    OPERATIVE HYSTEROSCOPY N/A 2021    Procedure: HYSTEROSCOPIC removal of intrauterine device;  Surgeon: Che Pizano MD;  Location:  OR            Social History:     Social History     Socioeconomic History    Marital status: Single     Spouse name: Not on file    Number of children: Not on file    Years of education: Not on file    Highest education level: Not on file   Occupational History    Not on file   Tobacco Use    Smoking status: Never     Passive exposure: Never    Smokeless tobacco: Never   Vaping Use    Vaping status: Never Used   Substance and Sexual Activity    Alcohol use: Not Currently     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   Other Topics Concern    Parent/sibling w/ CABG, MI or angioplasty before 65F 55M? No   Social History Narrative    Not on file     Social Drivers of Health     Financial Resource Strain: Low Risk  (2024)    Financial Resource Strain     Within the past 12 months, have you or your family members you live with been unable to get utilities (heat, electricity) when it was really  needed?: No   Food Insecurity: Low Risk  (11/1/2024)    Food Insecurity     Within the past 12 months, did you worry that your food would run out before you got money to buy more?: No     Within the past 12 months, did the food you bought just not last and you didn t have money to get more?: No   Transportation Needs: Low Risk  (11/1/2024)    Transportation Needs     Within the past 12 months, has lack of transportation kept you from medical appointments, getting your medicines, non-medical meetings or appointments, work, or from getting things that you need?: No   Physical Activity: Sufficiently Active (11/1/2024)    Exercise Vital Sign     Days of Exercise per Week: 5 days     Minutes of Exercise per Session: 60 min   Stress: Stress Concern Present (11/1/2024)    [employer] [place] [employer] - Occupational Stress Questionnaire     Feeling of Stress : To some extent   Social Connections: Unknown (11/1/2024)    Social Connection and Isolation Panel [NHANES]     Frequency of Communication with Friends and Family: Not on file     Frequency of Social Gatherings with Friends and Family: More than three times a week     Attends [employer] Services: Not on file     Active Member of Clubs or Organizations: Not on file     Attends Club or Organization Meetings: Not on file     Marital Status: Not on file   Interpersonal Safety: Low Risk  (1/27/2025)    Interpersonal Safety     Do you feel physically and emotionally safe where you currently live?: Yes     Within the past 12 months, have you been hit, slapped, kicked or otherwise physically hurt by someone?: No     Within the past 12 months, have you been humiliated or emotionally abused in other ways by your partner or ex-partner?: No   Housing Stability: Low Risk  (11/1/2024)    Housing Stability     Do you have housing? : Yes     Are you worried about losing your housing?: No            Family History:       Family History   Problem Relation Age of Onset     Diabetes Brother     Coronary Artery Disease Father     Hypertension Father     Hyperlipidemia Father     Depression Father     Hypertension Mother     Hyperlipidemia Mother     Breast Cancer Mother         over 70 when had breast cancer    Other Cancer Mother     Depression Sister     Anxiety Disorder Sister     Depression Daughter     Anxiety Disorder Daughter     Asthma Daughter     Thyroid Disease Sister             Medications:     Current Outpatient Medications   Medication Sig Dispense Refill    busPIRone (BUSPAR) 5 MG tablet Take 1 tablet (5 mg) by mouth 2 times daily. 180 tablet 3    diclofenac (VOLTAREN) 1 % topical gel Apply 4 g topically 4 times daily. Apply to hand and bilateral knees 150 g 3    estradiol (ESTRACE) 1 MG tablet TAKE 1 TABLET BY MOUTH EVERY DAY 90 tablet 1    levothyroxine (SYNTHROID/LEVOTHROID) 112 MCG tablet TAKE 1 TABLET BY MOUTH DAILY 90 tablet 2    progesterone (PROMETRIUM) 100 MG capsule TAKE 1 CAPSULE BY MOUTH EVERY DAY 90 capsule 1    sertraline (ZOLOFT) 50 MG tablet Take 1 tablet (50 mg) by mouth daily. 90 tablet 2     No current facility-administered medications for this visit.              Review of Systems:   A comprehensive 10 point review of systems (constitutional, ENT, cardiac, peripheral vascular, lymphatic, respiratory, GI, , Musculoskeletal, skin, Neurological) was performed and found to be negative except as described in this note.     See intake form completed by patient

## 2025-06-08 ASSESSMENT — KOOS JR
HOW SEVERE IS YOUR KNEE STIFFNESS AFTER FIRST WAKING IN MORNING: MODERATE
STANDING UPRIGHT: MILD
GOING UP OR DOWN STAIRS: MODERATE
RISING FROM SITTING: MILD
TWISING OR PIVOTING ON KNEE: SEVERE
STRAIGHTENING KNEE FULLY: MILD
BENDING TO THE FLOOR TO PICK UP OBJECT: MILD
KOOS JR SCORING: 59.38

## 2025-06-11 ENCOUNTER — ANCILLARY PROCEDURE (OUTPATIENT)
Dept: GENERAL RADIOLOGY | Facility: CLINIC | Age: 60
End: 2025-06-11
Attending: STUDENT IN AN ORGANIZED HEALTH CARE EDUCATION/TRAINING PROGRAM
Payer: OTHER GOVERNMENT

## 2025-06-11 ENCOUNTER — OFFICE VISIT (OUTPATIENT)
Dept: ORTHOPEDICS | Facility: CLINIC | Age: 60
End: 2025-06-11
Payer: OTHER GOVERNMENT

## 2025-06-11 VITALS — BODY MASS INDEX: 23.51 KG/M2 | HEIGHT: 57 IN | WEIGHT: 109 LBS

## 2025-06-11 DIAGNOSIS — M25.561 BILATERAL KNEE PAIN: ICD-10-CM

## 2025-06-11 DIAGNOSIS — M17.12 PRIMARY OSTEOARTHRITIS OF LEFT KNEE: Primary | ICD-10-CM

## 2025-06-11 DIAGNOSIS — M25.562 BILATERAL KNEE PAIN: ICD-10-CM

## 2025-06-11 DIAGNOSIS — M17.11 PRIMARY OSTEOARTHRITIS OF RIGHT KNEE: ICD-10-CM

## 2025-06-11 PROCEDURE — 77073 BONE LENGTH STUDIES: CPT | Performed by: STUDENT IN AN ORGANIZED HEALTH CARE EDUCATION/TRAINING PROGRAM

## 2025-06-11 PROCEDURE — 99204 OFFICE O/P NEW MOD 45 MIN: CPT | Performed by: STUDENT IN AN ORGANIZED HEALTH CARE EDUCATION/TRAINING PROGRAM

## 2025-06-11 PROCEDURE — 73562 X-RAY EXAM OF KNEE 3: CPT | Mod: LT | Performed by: RADIOLOGY

## 2025-06-11 RX ORDER — CEFAZOLIN SODIUM 2 G/50ML
2 SOLUTION INTRAVENOUS SEE ADMIN INSTRUCTIONS
OUTPATIENT
Start: 2025-06-11

## 2025-06-11 RX ORDER — CEFAZOLIN SODIUM 2 G/50ML
2 SOLUTION INTRAVENOUS
OUTPATIENT
Start: 2025-06-11

## 2025-06-11 RX ORDER — TRANEXAMIC ACID 650 MG/1
1950 TABLET ORAL ONCE
OUTPATIENT
Start: 2025-06-11 | End: 2025-06-11

## 2025-06-11 NOTE — NURSING NOTE
Teaching Flowsheet     Visit Type: In Clinic    Time Start: 8:45am  Time End: 9am  Total Time Spent: 15 min.    Surgeon: Dr. Lemons  Location of Surgery (known or anticipated): [facility] or [facility]  Type of Anesthesia: Choice  Worker's Compensation Procedure: No    Pertinent Medical History: PAST MEDICAL HISTORY:   Past Medical History:   Diagnosis Date    Arthritis 2017    Depressive disorder     Epilepsy (H)     Hypothyroidism     Hypothyroidism due to Hashimoto's thyroiditis 2018       PAST SURGICAL HISTORY:   Past Surgical History:   Procedure Laterality Date     SECTION  01    OPERATIVE HYSTEROSCOPY N/A 2021    Procedure: HYSTEROSCOPIC removal of intrauterine device;  Surgeon: Che Pizano MD;  Location: [facility] OR       FAMILY HISTORY:   Family History   Problem Relation Age of Onset    Diabetes Brother     Coronary Artery Disease Father     Hypertension Father     Hyperlipidemia Father     Depression Father     Hypertension Mother     Hyperlipidemia Mother     Breast Cancer Mother         over 70 when had breast cancer    Other Cancer Mother     Depression Sister     Anxiety Disorder Sister     Depression Daughter     Anxiety Disorder Daughter     Asthma Daughter     Thyroid Disease Sister        SOCIAL HISTORY:   Social History     Tobacco Use    Smoking status: Never     Passive exposure: Never    Smokeless tobacco: Never   Substance Use Topics    Alcohol use: Not Currently     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 Yes  BMI: Normal BMI    Relevant Diagnosis: left knee osteoarthritis  Teaching Topic: left total knee arthroplasty    Patient elects to proceed with surgery via fast-track.  Discussed total joint online class.  Patient will call if they require help for signing up for the total joint zoom class. Patient understands they will need a preop exam within 30 days of date  of surgery. Patient understands the expected length of stay and the expectation of outpatient physical therapy. Patient understands the need for an at home  after surgery and need for transportation home.  Discussed dental/non-sterile procedure prophylaxis. Discussed the [facility] Companion service.       Person(s) involved in teaching:   Patient  : No.   Verified Patient's Phone Number: YES: [phone]    Caregiver//  Name: Geovani Nix  Phone Number: [phone]   Relationship: Sister  Consent to Communicate on file: No       Motivation Level:  Asks Questions: Yes  Eager to Learn: Yes  Cooperative: Yes  Receptive (willing/able to accept information): Yes  Any cultural factors/Tenriism beliefs that may influence understanding or compliance? No     Patient demonstrates understanding of the following:  Reason for the appointment, diagnosis and treatment plan: Yes  Knowledge of proper use of medications and conditions for which they are ordered (with special attention to potential side effects or drug interactions): Yes  Which situations necessitate calling provider and whom to contact: Yes     Teaching Concerns Addressed:   Proper use and care of medical equip, care aids, etc.: Yes  Nutritional needs and diet plan: Yes  Pain management techniques: Yes  Wound Care: Yes  How and/when to access community resources: Yes  Need for pre-op with in 30 days: YES, will be done with PCP. I asked them to ensure they go over their daily medications during this visit and discuss what medications need to be stopped before surgery and when. If you are doing a pre-op with your PCP and they are not within the [facility], I ask them to fax it to our pre-op office. Patient verbalized understanding.       Does patient have difficulty getting a ride to appointments (post-ops, PT/OT): No  Patient's plan after discharge: home with family or spouse     Instructional Materials Used/Given:  two bottles of  chlorhexidine soap, a surgery packet, and a joint booklet given to patient in clinic.   - Important Contact Info/Phone Numbers: emphasizing clinic number [phone] and after hours number [phone]  - Map/location of surgery and follow-up appointments  - Showering instructions  - Stoplight Tool     -Next step: schedule a surgery date, schedule a pre-op with PCP, pre-op dental visit, and schedule post surgical physical therapy.    Nata Lu RN

## 2025-06-11 NOTE — LETTER
6/11/2025      Lety Alcantara  [address]  [facility] [address]      Dear Colleague,    Thank you for referring your patient, Lety Alcantara, to the [facility] [place]. Please see a copy of my visit note below.        [facility]  Orthopaedic Surgery Consultation by Pedro Lemons M.D.    Lety Alcantara MRN# [id number]   Age: 60 year old YOB: 1965     Requesting physician: Savanah Gilmore     Background history:  DX:  Right-sided low back pain with sciatica  Seizures  Hypothyroidism    TREATMENTS:  None           History of Present Illness:   60 year old female who presents our clinic for the evaluation of bilateral chronic knee pain left> right.  This pain has been present for multiple years and has become progressive over time.  No clear antecedent traumatic event.  Pain is felt on the anterior and medial side of the knee.  It is during activities such as kneeling, squatting, lunging, navigating stairs.  But it is also painful when walking.  Patient is an avid runner and can no longer do this because of her pain.  Patient reports occasional night pain.  She does endorse the presence of initiation stiffness and soreness.  She does not report significant effusions.  She reports crepitus and a sensation of instability.  She states that her symptoms are greatly affecting her activity level and quality of life.  To mitigate her pain she has tried over-the-counter analgesics.  She does not tolerate NSAIDs very well.  She has tried physical therapy, activity modification and has had injections with both cortisone and hyaluronic acid with which did not provide significant or long-term relief.    Social:   Occupation: Works for [employer]   Living situation: sister and daughter   Hobbies / Sports: working out, running, hiking.    Smoking: No  Alcohol: No  Illicit drug use: No         Physical Exam:     EXAMINATION pertinent findings:  "  PSYCH: Pleasant, healthy-appearing, alert, oriented x3, cooperative. Normal mood and affect.  VITAL SIGNS: Height 1.448 m (4' 9\"), weight 49.4 kg (109 lb), last menstrual period 04/06/2018, not currently breastfeeding.  Reviewed nursing intake notes.   Body mass index is 23.59 kg/m .  RESP: non labored breathing   ABD: benign, soft, non-tender, no acute peritoneal findings  SKIN: grossly normal   LYMPHATIC: grossly normal, no adenopathy, no extremity edema  NEURO: grossly normal , no motor deficits  VASCULAR: satisfactory perfusion of all extremities   MUSCULOSKELETAL:   Alignment: Neutral  Gait: Normal    Bilateral hips exhibited full range of motion.  No pain upon rotations.  This excess negative.  No tenderness palpation over greater trochanteric region.      L knee: -0-0 .  Deep flexion is recognizably painful.  Straight leg raise +. No redness, warmth or skin changes present. Effusion No. Ligamentously stable in both ML and AP direction. Normal PF tracking with crepitus.  Rabot's recognizably painful.  Apprehension -. Meniscal provocation tests are negative.  There is recognizable tenderness to palpation over the medial joint line.     R knee: -0-0 .  Deep flexion is recognizably painful.  Straight leg raise +. No redness, warmth or skin changes present. Effusion No. Ligamentously stable in both ML and AP direction. Normal PF tracking with crepitus.  Rabot's recognizably painful.  Apprehension -. Meniscal provocation tests are negative.  There is recognizable tenderness to palpation over the medial joint line.     Bilateral LE:   Thigh and leg compartments soft and compressible   +Quad/TA/GSC/FHL/EHL   SILT DP/SP/Hollis/Saph/Tib nerve distributions   Palpable dorsalis pedis pulse            Data:   All laboratory data reviewed  All imaging studies reviewed by me personally.    XR alignment films 6/11/2025:  My interpretation: Slight varus alignment of bilateral lower extremities.    XR bilateral " knees 6/11/2025:  My interpretation: End-stage osteoarthritic changes of patellofemoral joints with near complete obliteration of joint space, presence of marginal osteophytes, sclerosis and subchondral cysts.  Moderate to severe osteoarthritic change of medial compartment with significant joint space narrowing, presence of marginal osteophytes and subchondral cyst.  Mild osteoarthritic change of lateral compartments.    MRI knee left 8/29/2024:  My interpretation: End-stage osteoarthritic changes of patellofemoral joint with grade IV chondromalacia, associated subchondral cyst and bone marrow edema on both trochlear and patellar side.  Significant osteoarthritic changes of the medial compartment with subchondral cysts and associated bone marrow edema.  Grade III-IV chondromalacia.  Intact ACL/PCL.    MRI knee right 10/28/2024:  My interpretation: End-stage osteoarthritic changes of patellofemoral joint with grade IV chondromalacia, associated subchondral cyst and bone marrow edema on both trochlear and patellar side.  Significant osteoarthritic changes of the medial compartment with subchondral cysts and associated bone marrow edema.  Grade III-IV chondromalacia.  Intact ACL/PCL.         Assessment and Plan:   Assessment:  60-year-old female presenting with chronic bilateral knee pain left> right due to end-stage osteoarthritic change in patellofemoral joint and moderate osteoarthritic change and tibiofemoral joint.  Insufficiently responding to nonsurgical treatment options.    Plan:  I had a long discussion with the patient regarding etiology and ongoing management options.  Reviewed surgical and nonsurgical treatments.  The non-surgical options include activity modification, pain medication, PT, bracing and injection therapy.  At this point patient has exhausted all nonsurgical treatment options.  As for surgery we discussed the options of partial and total knee replacement surgery.  Given the osteoarthritic  changes visualized on MRI and tibiofemoral joint and associated bone marrow edema perhaps the optimal choice would be to proceed with knee replacement surgery rather than patellofemoral arthroplasty.  We reviewed total knee replacement in detail including the procedure, the implants, the recovery process, and long-term outcomes.  We reviewed that the risks of the surgery include but are not limited to infection, wound problems, stiffness, persistent pain, swelling, clicking, loosening, revision surgery.  We also reviewed less common risks such as neurovascular injury fracture, and other implant-related issues.  We reviewed other medical complications such as a blood clot.  We discussed that the vast majority of cases have a highly successful outcome.  However there is a small subset of patients that do experience complications or problems following the knee replacement and these problems can be very debilitating and painful and sometimes do not improve.  We discussed that knee replacement surgery will not guarantee her being able to run in the future.  Based on a discussion of the risks and benefits, we believe that the benefits far outweigh the risks at this point and the patient would like to proceed with left total knee replacement surgery.  We will work on scheduling surgery at a time that works well for patient in the next few months.  Patient will contact us if there are any questions or concerns leading up to surgery. Before surgery the patient will attend a joint replacement class and will be seen by the PCP/anesthesiologist and dentist.    Patient is a candidate for fast-track total knee arthroplasty candidate.       For additional information and frequently asked questions regarding joint replacements, scan the QR code image below on your phone camera or go to: [email or web address]             Thank you for your referral.    Pedro Lemons MD,  PhD     Adult Reconstruction  [facility] Department of Orthopaedic Surgery    This note was created using dictation software and may contain errors.  Please contact the creator for any clarifications that are needed.    DATA for DOCUMENTATION:         Past Medical History:     Patient Active Problem List   Diagnosis     Mood swings     Seizures (H)     Hypothyroidism due to Hashimoto's thyroiditis     IUD (intrauterine device) in place, Paragard     Right-sided low back pain with right-sided sciatica, unspecified chronicity     Mechanical breakdown of intrauterine contraceptive device, initial encounter     Mild episode of recurrent major depressive disorder     Past Medical History:   Diagnosis Date     Arthritis 2017     Depressive disorder      Epilepsy (H)      Hypothyroidism      Hypothyroidism due to Hashimoto's thyroiditis 2018       Also see scanned health assessment forms.       Past Surgical History:     Past Surgical History:   Procedure Laterality Date      SECTION  01     OPERATIVE HYSTEROSCOPY N/A 2021    Procedure: HYSTEROSCOPIC removal of intrauterine device;  Surgeon: Che Pizano MD;  Location:  OR            Social History:     Social History     Socioeconomic History     Marital status: Single     Spouse name: Not on file     Number of children: Not on file     Years of education: Not on file     Highest education level: Not on file   Occupational History     Not on file   Tobacco Use     Smoking status: Never     Passive exposure: Never     Smokeless tobacco: Never   Vaping Use     Vaping status: Never Used   Substance and Sexual Activity     Alcohol use: Not Currently     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   Other Topics Concern     Parent/sibling w/ CABG, MI or angioplasty before 65F 55M? No   Social  History Narrative     Not on file     Social Drivers of Health     Financial Resource Strain: Low Risk  (11/1/2024)    Financial Resource Strain      Within the past 12 months, have you or your family members you live with been unable to get utilities (heat, electricity) when it was really needed?: No   Food Insecurity: Low Risk  (11/1/2024)    Food Insecurity      Within the past 12 months, did you worry that your food would run out before you got money to buy more?: No      Within the past 12 months, did the food you bought just not last and you didn t have money to get more?: No   Transportation Needs: Low Risk  (11/1/2024)    Transportation Needs      Within the past 12 months, has lack of transportation kept you from medical appointments, getting your medicines, non-medical meetings or appointments, work, or from getting things that you need?: No   Physical Activity: Sufficiently Active (11/1/2024)    Exercise Vital Sign      Days of Exercise per Week: 5 days      Minutes of Exercise per Session: 60 min   Stress: Stress Concern Present (11/1/2024)    [place] [employer] - Occupational Stress Questionnaire      Feeling of Stress : To some extent   Social Connections: Unknown (11/1/2024)    Social Connection and Isolation Panel [NHANES]      Frequency of Communication with Friends and Family: Not on file      Frequency of Social Gatherings with Friends and Family: More than three times a week      Attends Orthodoxy Services: Not on file      Active Member of Clubs or Organizations: Not on file      Attends Club or Organization Meetings: Not on file      Marital Status: Not on file   Interpersonal Safety: Low Risk  (1/27/2025)    Interpersonal Safety      Do you feel physically and emotionally safe where you currently live?: Yes      Within the past 12 months, have you been hit, slapped, kicked or otherwise physically hurt by someone?: No      Within the past 12 months, have you been  humiliated or emotionally abused in other ways by your partner or ex-partner?: No   Housing Stability: Low Risk  (11/1/2024)    Housing Stability      Do you have housing? : Yes      Are you worried about losing your housing?: No            Family History:       Family History   Problem Relation Age of Onset     Diabetes Brother      Coronary Artery Disease Father      Hypertension Father      Hyperlipidemia Father      Depression Father      Hypertension Mother      Hyperlipidemia Mother      Breast Cancer Mother         over 70 when had breast cancer     Other Cancer Mother      Depression Sister      Anxiety Disorder Sister      Depression Daughter      Anxiety Disorder Daughter      Asthma Daughter      Thyroid Disease Sister             Medications:     Current Outpatient Medications   Medication Sig Dispense Refill     busPIRone (BUSPAR) 5 MG tablet Take 1 tablet (5 mg) by mouth 2 times daily. 180 tablet 3     diclofenac (VOLTAREN) 1 % topical gel Apply 4 g topically 4 times daily. Apply to hand and bilateral knees 150 g 3     estradiol (ESTRACE) 1 MG tablet TAKE 1 TABLET BY MOUTH EVERY DAY 90 tablet 1     levothyroxine (SYNTHROID/LEVOTHROID) 112 MCG tablet TAKE 1 TABLET BY MOUTH DAILY 90 tablet 2     progesterone (PROMETRIUM) 100 MG capsule TAKE 1 CAPSULE BY MOUTH EVERY DAY 90 capsule 1     sertraline (ZOLOFT) 50 MG tablet Take 1 tablet (50 mg) by mouth daily. 90 tablet 2     No current facility-administered medications for this visit.              Review of Systems:   A comprehensive 10 point review of systems (constitutional, ENT, cardiac, peripheral vascular, lymphatic, respiratory, GI, , Musculoskeletal, skin, Neurological) was performed and found to be negative except as described in this note.     See intake form completed by patient      Again, thank you for allowing me to participate in the care of your patient.        Sincerely,        Pedro Lemons MD    Electronically signed

## 2025-06-12 ENCOUNTER — TELEPHONE (OUTPATIENT)
Dept: ORTHOPEDICS | Facility: CLINIC | Age: 60
End: 2025-06-12
Payer: OTHER GOVERNMENT

## 2025-06-24 ENCOUNTER — THERAPY VISIT (OUTPATIENT)
Dept: PHYSICAL THERAPY | Facility: CLINIC | Age: 60
End: 2025-06-24
Payer: OTHER GOVERNMENT

## 2025-06-24 DIAGNOSIS — M54.50 LUMBAGO: Primary | ICD-10-CM

## 2025-06-24 PROCEDURE — 97530 THERAPEUTIC ACTIVITIES: CPT | Mod: GP | Performed by: PHYSICAL THERAPIST

## 2025-06-24 PROCEDURE — 97110 THERAPEUTIC EXERCISES: CPT | Mod: GP | Performed by: PHYSICAL THERAPIST

## 2025-06-24 PROCEDURE — 97161 PT EVAL LOW COMPLEX 20 MIN: CPT | Mod: GP | Performed by: PHYSICAL THERAPIST

## 2025-06-24 NOTE — PROGRESS NOTES
PHYSICAL THERAPY EVALUATION  Type of Visit: Evaluation       Fall Risk Screen:  Have you fallen 2 or more times in the past year?: No  Have you fallen and had an injury in the past year?: No    Subjective   KEY PT FINDINGS:  1) No limitation of spine motion, pain with SB, extension  2) + Slump on right  3) Decreased core activation - biased towards posterior pelvic tilt .    Physical Therapy Initial Evaluation: Subjective History     Injury/Condition Details:  Presenting Complaint Low back pain, right leg pain   Onset Timing/Date Long standing history   Mechanism Started in 2017, had a crazy dog and went on dog walks, fell multiple times that winter and started to have back pain. History of hamstring strain prior to all of the back pain.   Went to the MD - did MRI, tried chiropractor, tried 1 session of PT, did prednisone, tried steroid injections, tried ablation.     Nothing has significantly helped. Been living with it.   Intermittent pain, severe intermittent pain.     Squatting down relieves the pressure when it starts to hurt. Can move in a way to make it feel better.      Symptom Behavior Details    Primary Symptoms Sporadic symptoms; Activity/position dependent, pain (Location: right leg pain, glute, hamstring, bilateral low back, Quality: Aching/Throbbing), stiffness  Denies numbness/tingling, denies feelings of weakness.    Worst Pain 5-6/10 (with see below)   Symptom Provocators Prolonged walking  Back bending   Heavier loads with strength training   Driving (has a cushion that helps)   Sleeping - difficulty with positioning.    Best Pain 0/10    Symptom Relievers Certain positions.    Time of day dependent? No   Recent symptom change? no change in symptoms     Prior Testing/Intervention for current condition:  Prior Tests  x-ray, MRI, but nothing recently   Prior Treatment PT  and Injections: steroid, ablations (not long term helpful)     Lifestyle & General Medical History: see prior evaluations         Presenting condition or subjective complaint: lower back pain, radiates down legs  Date of onset:      Relevant medical history: Arthritis; Depression; Menopause; Migraines or headaches; Osteoarthritis; Seizures; Substance use disorder; Thyroid problems   Dates & types of surgery:  2001    Prior diagnostic imaging/testing results: MRI; X-ray     Prior therapy history for the same diagnosis, illness or injury: No      Living Environment  Social support: With family members   Type of home: House; 2-story; Basement   Stairs to enter the home: Yes 10 Is there a railing: Yes     Ramp: No   Stairs inside the home: Yes 18 Is there a railing: Yes     Help at home: None  Equipment owned:       Employment: Yes accounts receivable technician [employer]  Hobbies/Interests: running, hiking, reading, camping    Patient goals for therapy: longer walks, lift more weight, drive longer distances without pain     Objective   PHYSICAL THERAPY SPINE EXAMINATION    Dynamic Movement Screen:  2 leg stance: equal weight bearing, mild decrease in lumbar lordosis  2 leg squat:pain in the knees, able to maintain neutral spine posture    1 leg stance: deferred    Gait: mild loss of arm swing, non-antalgic gait pattern.     Lumbar & Thoracic Spine ROM Screen   RIGHT LEFT   Standing Lumbar Spine ROM   Flexion Hands to ankles, relieving   Extension Minimal loss of motion,    Sidebend Pain contralateral side Pain contralateral side   Seated Thoracic Spine ROM   Rotation NT NT     Sacroiliac Joint Provocative Testing: Deferred     Passive Joint Mobility Screen: Deferred    Tender to palpation at the following structures: Deferred     SUSPECTED DIRECTIONAL PREFERENCE: N/A     Lower Extremity Neural System Examination   Right Left   NEURAL CONDUCTION     Dermatome Screen (sensation) - -   Myotome Screen (motor) - -   NEURAL TENSION     Seated Slump Test + -   Supine SLR Test (Sciatic n.) - -     Lower Extremity Flexibility Screen:   Right Left    Hamstring - -   FELIX (low back pain reported on both sides with FELIX) - -   - = normal  + = mild tightness  ++ = moderate tightness  +++ = significant tightness    Lower Extremity Muscle Strength (x/5): deferred    TA activation: biased towards posterior pelvic tilt, fatigues quickly.           Assessment & Plan   CLINICAL IMPRESSIONS  Medical Diagnosis:      Treatment Diagnosis:     Impression/Assessment: Patient is a 60 year old female with low back complaints.  The following significant findings have been identified: Pain, Decreased strength, Impaired muscle performance, and Decreased activity tolerance. These impairments interfere with their ability to perform self care tasks, work tasks, and recreational activities as compared to previous level of function.     Clinical Decision Making (Complexity):  Clinical Presentation: Stable/Uncomplicated  Clinical Presentation Rationale: based on medical and personal factors listed in PT evaluation  Clinical Decision Making (Complexity): Low complexity    PLAN OF CARE  Treatment Interventions:  Interventions: Manual Therapy, Neuromuscular Re-education, Therapeutic Activity, Therapeutic Exercise    Long Term Goals            Frequency of Treatment:    Duration of Treatment:      Recommended Referrals to Other Professionals: Physical Therapy  Education Assessment:        Risks and benefits of evaluation/treatment have been explained.   Patient/Family/caregiver agrees with Plan of Care.     Evaluation Time:           Signing Clinician: Denice Wasserman, PT

## 2025-08-10 ENCOUNTER — MYC MEDICAL ADVICE (OUTPATIENT)
Dept: ORTHOPEDICS | Facility: CLINIC | Age: 60
End: 2025-08-10
Payer: OTHER GOVERNMENT

## 2025-08-12 ENCOUNTER — OFFICE VISIT (OUTPATIENT)
Dept: FAMILY MEDICINE | Facility: CLINIC | Age: 60
End: 2025-08-12
Payer: OTHER GOVERNMENT

## 2025-08-12 VITALS
BODY MASS INDEX: 24.36 KG/M2 | HEART RATE: 51 BPM | HEIGHT: 56 IN | WEIGHT: 108.3 LBS | DIASTOLIC BLOOD PRESSURE: 73 MMHG | RESPIRATION RATE: 14 BRPM | OXYGEN SATURATION: 98 % | TEMPERATURE: 96.8 F | SYSTOLIC BLOOD PRESSURE: 123 MMHG

## 2025-08-12 DIAGNOSIS — F33.0 MILD EPISODE OF RECURRENT MAJOR DEPRESSIVE DISORDER: ICD-10-CM

## 2025-08-12 DIAGNOSIS — Z01.818 PREOP GENERAL PHYSICAL EXAM: Primary | ICD-10-CM

## 2025-08-12 DIAGNOSIS — R56.9 SEIZURES (H): ICD-10-CM

## 2025-08-12 DIAGNOSIS — Z13.6 SCREENING FOR CARDIOVASCULAR CONDITION: ICD-10-CM

## 2025-08-12 DIAGNOSIS — M17.12 PRIMARY OSTEOARTHRITIS OF LEFT KNEE: ICD-10-CM

## 2025-08-12 DIAGNOSIS — E06.3 HYPOTHYROIDISM DUE TO HASHIMOTO'S THYROIDITIS: ICD-10-CM

## 2025-08-12 LAB
ANION GAP SERPL CALCULATED.3IONS-SCNC: 8 MMOL/L (ref 7–15)
BUN SERPL-MCNC: 15.7 MG/DL (ref 6–20)
CALCIUM SERPL-MCNC: 9.4 MG/DL (ref 8.8–10.4)
CHLORIDE SERPL-SCNC: 104 MMOL/L (ref 98–107)
CHOLEST SERPL-MCNC: 172 MG/DL
CREAT SERPL-MCNC: 0.75 MG/DL (ref 0.51–0.95)
EGFRCR SERPLBLD CKD-EPI 2021: >90 ML/MIN/1.73M2
ERYTHROCYTE [DISTWIDTH] IN BLOOD BY AUTOMATED COUNT: 12.2 % (ref 10–15)
FASTING STATUS PATIENT QL REPORTED: YES
FASTING STATUS PATIENT QL REPORTED: YES
GLUCOSE SERPL-MCNC: 87 MG/DL (ref 70–99)
HCO3 SERPL-SCNC: 25 MMOL/L (ref 22–29)
HCT VFR BLD AUTO: 37.8 % (ref 35–47)
HDLC SERPL-MCNC: 51 MG/DL
HGB BLD-MCNC: 12.7 G/DL (ref 11.7–15.7)
LDLC SERPL CALC-MCNC: 109 MG/DL
MCH RBC QN AUTO: 29.6 PG (ref 26.5–33)
MCHC RBC AUTO-ENTMCNC: 33.6 G/DL (ref 31.5–36.5)
MCV RBC AUTO: 88 FL (ref 78–100)
NONHDLC SERPL-MCNC: 121 MG/DL
PLATELET # BLD AUTO: 326 10E3/UL (ref 150–450)
POTASSIUM SERPL-SCNC: 4.8 MMOL/L (ref 3.4–5.3)
RBC # BLD AUTO: 4.29 10E6/UL (ref 3.8–5.2)
SODIUM SERPL-SCNC: 137 MMOL/L (ref 135–145)
T4 FREE SERPL-MCNC: 1.71 NG/DL (ref 0.9–1.7)
TRIGL SERPL-MCNC: 60 MG/DL
TSH SERPL DL<=0.005 MIU/L-ACNC: 0.05 UIU/ML (ref 0.3–4.2)
WBC # BLD AUTO: 3.5 10E3/UL (ref 4–11)

## 2025-08-12 PROCEDURE — 85027 COMPLETE CBC AUTOMATED: CPT | Performed by: NURSE PRACTITIONER

## 2025-08-12 PROCEDURE — G2211 COMPLEX E/M VISIT ADD ON: HCPCS | Performed by: NURSE PRACTITIONER

## 2025-08-12 PROCEDURE — 80048 BASIC METABOLIC PNL TOTAL CA: CPT | Performed by: NURSE PRACTITIONER

## 2025-08-12 PROCEDURE — 96127 BRIEF EMOTIONAL/BEHAV ASSMT: CPT | Performed by: NURSE PRACTITIONER

## 2025-08-12 PROCEDURE — 84439 ASSAY OF FREE THYROXINE: CPT | Performed by: NURSE PRACTITIONER

## 2025-08-12 PROCEDURE — 80061 LIPID PANEL: CPT | Performed by: NURSE PRACTITIONER

## 2025-08-12 PROCEDURE — 99214 OFFICE O/P EST MOD 30 MIN: CPT | Performed by: NURSE PRACTITIONER

## 2025-08-12 PROCEDURE — 36415 COLL VENOUS BLD VENIPUNCTURE: CPT | Performed by: NURSE PRACTITIONER

## 2025-08-12 PROCEDURE — 84443 ASSAY THYROID STIM HORMONE: CPT | Performed by: NURSE PRACTITIONER

## 2025-08-12 ASSESSMENT — PAIN SCALES - GENERAL: PAINLEVEL_OUTOF10: NO PAIN (0)

## 2025-08-12 ASSESSMENT — PATIENT HEALTH QUESTIONNAIRE - PHQ9
10. IF YOU CHECKED OFF ANY PROBLEMS, HOW DIFFICULT HAVE THESE PROBLEMS MADE IT FOR YOU TO DO YOUR WORK, TAKE CARE OF THINGS AT HOME, OR GET ALONG WITH OTHER PEOPLE: SOMEWHAT DIFFICULT
SUM OF ALL RESPONSES TO PHQ QUESTIONS 1-9: 3
SUM OF ALL RESPONSES TO PHQ QUESTIONS 1-9: 3

## 2025-08-15 ENCOUNTER — MYC MEDICAL ADVICE (OUTPATIENT)
Dept: ORTHOPEDICS | Facility: CLINIC | Age: 60
End: 2025-08-15
Payer: OTHER GOVERNMENT

## 2025-08-16 DIAGNOSIS — F41.1 GAD (GENERALIZED ANXIETY DISORDER): ICD-10-CM

## 2025-08-18 ENCOUNTER — TELEPHONE (OUTPATIENT)
Dept: ORTHOPEDICS | Facility: CLINIC | Age: 60
End: 2025-08-18
Payer: OTHER GOVERNMENT

## 2025-08-18 DIAGNOSIS — M23.91 INTERNAL DERANGEMENT OF RIGHT KNEE: Primary | ICD-10-CM

## 2025-08-18 DIAGNOSIS — M17.0 BILATERAL PRIMARY OSTEOARTHRITIS OF KNEE: ICD-10-CM

## 2025-08-18 DIAGNOSIS — Z96.651 S/P TOTAL KNEE ARTHROPLASTY, RIGHT: ICD-10-CM

## 2025-08-29 ASSESSMENT — ACTIVITIES OF DAILY LIVING (ADL)
AS_A_RESULT_OF_YOUR_KNEE_INJURY,_HOW_WOULD_YOU_RATE_YOUR_CURRENT_LEVEL_OF_DAILY_ACTIVITY?: ABNORMAL
KNEEL ON THE FRONT OF YOUR KNEE: ACTIVITY IS VERY DIFFICULT
STIFFNESS: THE SYMPTOM AFFECTS MY ACTIVITY SEVERELY
LIMPING: THE SYMPTOM AFFECTS MY ACTIVITY SLIGHTLY
WALK: ACTIVITY IS SOMEWHAT DIFFICULT
RAW_SCORE: 41
GIVING WAY, BUCKLING OR SHIFTING OF KNEE: THE SYMPTOM AFFECTS MY ACTIVITY SLIGHTLY
GO DOWN STAIRS: ACTIVITY IS FAIRLY DIFFICULT
SQUAT: ACTIVITY IS SOMEWHAT DIFFICULT
WEAKNESS: I HAVE THE SYMPTOM BUT IT DOES NOT AFFECT MY ACTIVITY
HOW_WOULD_YOU_RATE_THE_CURRENT_FUNCTION_OF_YOUR_KNEE_DURING_YOUR_USUAL_DAILY_ACTIVITIES_ON_A_SCALE_FROM_0_TO_100_WITH_100_BEING_YOUR_LEVEL_OF_KNEE_FUNCTION_PRIOR_TO_YOUR_INJURY_AND_0_BEING_THE_INABILITY_TO_PERFORM_ANY_OF_YOUR_USUAL_DAILY_ACTIVITIES?: 50
LIMPING: THE SYMPTOM AFFECTS MY ACTIVITY SLIGHTLY
SIT WITH YOUR KNEE BENT: ACTIVITY IS MINIMALLY DIFFICULT
HOW_WOULD_YOU_RATE_THE_CURRENT_FUNCTION_OF_YOUR_KNEE_DURING_YOUR_USUAL_DAILY_ACTIVITIES_ON_A_SCALE_FROM_0_TO_100_WITH_100_BEING_YOUR_LEVEL_OF_KNEE_FUNCTION_PRIOR_TO_YOUR_INJURY_AND_0_BEING_THE_INABILITY_TO_PERFORM_ANY_OF_YOUR_USUAL_DAILY_ACTIVITIES?: 50
GO UP STAIRS: ACTIVITY IS FAIRLY DIFFICULT
HOW_WOULD_YOU_RATE_THE_OVERALL_FUNCTION_OF_YOUR_KNEE_DURING_YOUR_USUAL_DAILY_ACTIVITIES?: ABNORMAL
PAIN: THE SYMPTOM AFFECTS MY ACTIVITY SEVERELY
KNEE_ACTIVITY_OF_DAILY_LIVING_SUM: 41
GO DOWN STAIRS: ACTIVITY IS FAIRLY DIFFICULT
SWELLING: I DO NOT HAVE THE SYMPTOM
SWELLING: I DO NOT HAVE THE SYMPTOM
HOW_WOULD_YOU_RATE_THE_OVERALL_FUNCTION_OF_YOUR_KNEE_DURING_YOUR_USUAL_DAILY_ACTIVITIES?: ABNORMAL
STIFFNESS: THE SYMPTOM AFFECTS MY ACTIVITY SEVERELY
WALK: ACTIVITY IS SOMEWHAT DIFFICULT
RISE FROM A CHAIR: ACTIVITY IS MINIMALLY DIFFICULT
SIT WITH YOUR KNEE BENT: ACTIVITY IS MINIMALLY DIFFICULT
KNEEL ON THE FRONT OF YOUR KNEE: ACTIVITY IS VERY DIFFICULT
GO UP STAIRS: ACTIVITY IS FAIRLY DIFFICULT
SQUAT: ACTIVITY IS SOMEWHAT DIFFICULT
GIVING WAY, BUCKLING OR SHIFTING OF KNEE: THE SYMPTOM AFFECTS MY ACTIVITY SLIGHTLY
PLEASE_INDICATE_YOR_PRIMARY_REASON_FOR_REFERRAL_TO_THERAPY:: KNEE
RISE FROM A CHAIR: ACTIVITY IS MINIMALLY DIFFICULT
AS_A_RESULT_OF_YOUR_KNEE_INJURY,_HOW_WOULD_YOU_RATE_YOUR_CURRENT_LEVEL_OF_DAILY_ACTIVITY?: ABNORMAL
STAND: ACTIVITY IS NOT DIFFICULT
WEAKNESS: I HAVE THE SYMPTOM BUT IT DOES NOT AFFECT MY ACTIVITY
PAIN: THE SYMPTOM AFFECTS MY ACTIVITY SEVERELY
STAND: ACTIVITY IS NOT DIFFICULT
KNEE_ACTIVITY_OF_DAILY_LIVING_SCORE: 58.57

## 2025-09-02 ENCOUNTER — APPOINTMENT (OUTPATIENT)
Dept: GENERAL RADIOLOGY | Facility: CLINIC | Age: 60
End: 2025-09-02
Attending: PHYSICIAN ASSISTANT
Payer: OTHER GOVERNMENT

## 2025-09-02 ENCOUNTER — HOSPITAL ENCOUNTER (OUTPATIENT)
Facility: CLINIC | Age: 60
Discharge: HOME | End: 2025-09-02
Attending: STUDENT IN AN ORGANIZED HEALTH CARE EDUCATION/TRAINING PROGRAM | Admitting: STUDENT IN AN ORGANIZED HEALTH CARE EDUCATION/TRAINING PROGRAM
Payer: OTHER GOVERNMENT

## 2025-09-02 ENCOUNTER — ANESTHESIA (OUTPATIENT)
Dept: SURGERY | Facility: CLINIC | Age: 60
End: 2025-09-02
Payer: OTHER GOVERNMENT

## 2025-09-02 ENCOUNTER — ANESTHESIA EVENT (OUTPATIENT)
Dept: SURGERY | Facility: CLINIC | Age: 60
End: 2025-09-02
Payer: OTHER GOVERNMENT

## 2025-09-02 VITALS
BODY MASS INDEX: 23.78 KG/M2 | SYSTOLIC BLOOD PRESSURE: 161 MMHG | WEIGHT: 107 LBS | TEMPERATURE: 98.1 F | OXYGEN SATURATION: 100 % | RESPIRATION RATE: 11 BRPM | HEART RATE: 65 BPM | DIASTOLIC BLOOD PRESSURE: 109 MMHG

## 2025-09-02 DIAGNOSIS — Z96.652 STATUS POST TOTAL KNEE REPLACEMENT, LEFT: Primary | ICD-10-CM

## 2025-09-02 PROCEDURE — 250N000011 HC RX IP 250 OP 636: Performed by: NURSE ANESTHETIST, CERTIFIED REGISTERED

## 2025-09-02 PROCEDURE — 250N000011 HC RX IP 250 OP 636: Performed by: STUDENT IN AN ORGANIZED HEALTH CARE EDUCATION/TRAINING PROGRAM

## 2025-09-02 PROCEDURE — 999N000065 XR KNEE PORT LEFT 1/2 VIEWS: Mod: LT

## 2025-09-02 PROCEDURE — 250N000013 HC RX MED GY IP 250 OP 250 PS 637: Performed by: STUDENT IN AN ORGANIZED HEALTH CARE EDUCATION/TRAINING PROGRAM

## 2025-09-02 PROCEDURE — 258N000003 HC RX IP 258 OP 636: Performed by: STUDENT IN AN ORGANIZED HEALTH CARE EDUCATION/TRAINING PROGRAM

## 2025-09-02 PROCEDURE — 250N000009 HC RX 250: Performed by: NURSE ANESTHETIST, CERTIFIED REGISTERED

## 2025-09-02 PROCEDURE — 97530 THERAPEUTIC ACTIVITIES: CPT | Mod: GP

## 2025-09-02 PROCEDURE — 258N000001 HC RX 258: Performed by: STUDENT IN AN ORGANIZED HEALTH CARE EDUCATION/TRAINING PROGRAM

## 2025-09-02 PROCEDURE — 710N000009 HC RECOVERY PHASE 1, LEVEL 1, PER MIN: Performed by: STUDENT IN AN ORGANIZED HEALTH CARE EDUCATION/TRAINING PROGRAM

## 2025-09-02 PROCEDURE — C1776 JOINT DEVICE (IMPLANTABLE): HCPCS | Performed by: STUDENT IN AN ORGANIZED HEALTH CARE EDUCATION/TRAINING PROGRAM

## 2025-09-02 PROCEDURE — 360N000077 HC SURGERY LEVEL 4, PER MIN: Performed by: STUDENT IN AN ORGANIZED HEALTH CARE EDUCATION/TRAINING PROGRAM

## 2025-09-02 PROCEDURE — C1713 ANCHOR/SCREW BN/BN,TIS/BN: HCPCS | Performed by: STUDENT IN AN ORGANIZED HEALTH CARE EDUCATION/TRAINING PROGRAM

## 2025-09-02 PROCEDURE — 250N000009 HC RX 250: Performed by: STUDENT IN AN ORGANIZED HEALTH CARE EDUCATION/TRAINING PROGRAM

## 2025-09-02 PROCEDURE — 258N000003 HC RX IP 258 OP 636: Performed by: ANESTHESIOLOGY

## 2025-09-02 PROCEDURE — 250N000013 HC RX MED GY IP 250 OP 250 PS 637: Performed by: ANESTHESIOLOGY

## 2025-09-02 PROCEDURE — 272N000001 HC OR GENERAL SUPPLY STERILE: Performed by: STUDENT IN AN ORGANIZED HEALTH CARE EDUCATION/TRAINING PROGRAM

## 2025-09-02 PROCEDURE — 97116 GAIT TRAINING THERAPY: CPT | Mod: GP

## 2025-09-02 PROCEDURE — 710N000012 HC RECOVERY PHASE 2, PER MINUTE: Performed by: STUDENT IN AN ORGANIZED HEALTH CARE EDUCATION/TRAINING PROGRAM

## 2025-09-02 PROCEDURE — 370N000017 HC ANESTHESIA TECHNICAL FEE, PER MIN: Performed by: STUDENT IN AN ORGANIZED HEALTH CARE EDUCATION/TRAINING PROGRAM

## 2025-09-02 PROCEDURE — 999N000141 HC STATISTIC PRE-PROCEDURE NURSING ASSESSMENT: Performed by: STUDENT IN AN ORGANIZED HEALTH CARE EDUCATION/TRAINING PROGRAM

## 2025-09-02 PROCEDURE — 258N000003 HC RX IP 258 OP 636: Performed by: NURSE ANESTHETIST, CERTIFIED REGISTERED

## 2025-09-02 PROCEDURE — 250N000011 HC RX IP 250 OP 636: Performed by: ANESTHESIOLOGY

## 2025-09-02 PROCEDURE — 27447 TOTAL KNEE ARTHROPLASTY: CPT | Mod: LT | Performed by: STUDENT IN AN ORGANIZED HEALTH CARE EDUCATION/TRAINING PROGRAM

## 2025-09-02 PROCEDURE — 250N000011 HC RX IP 250 OP 636: Performed by: PHYSICIAN ASSISTANT

## 2025-09-02 PROCEDURE — 97161 PT EVAL LOW COMPLEX 20 MIN: CPT | Mod: GP

## 2025-09-02 DEVICE — PATELLA ALL POLY 29MM: Type: IMPLANTABLE DEVICE | Site: KNEE | Status: FUNCTIONAL

## 2025-09-02 DEVICE — BONE CEMENT MIXING SYSTEM W/FEM PRESSURIZER 06065730000: Type: IMPLANTABLE DEVICE | Site: KNEE | Status: FUNCTIONAL

## 2025-09-02 DEVICE — IMPLANTABLE DEVICE: Type: IMPLANTABLE DEVICE | Site: KNEE | Status: FUNCTIONAL

## 2025-09-02 DEVICE — IMP TIBIAL ZIM PSN NP STM 5DEG SZ CL 42-5320-064-01: Type: IMPLANTABLE DEVICE | Site: KNEE | Status: FUNCTIONAL

## 2025-09-02 DEVICE — BONE CEMENT SIMPLEX FULL DOSE 6191-1-001: Type: IMPLANTABLE DEVICE | Site: KNEE | Status: FUNCTIONAL

## 2025-09-02 RX ORDER — FENTANYL CITRATE 50 UG/ML
25 INJECTION, SOLUTION INTRAMUSCULAR; INTRAVENOUS EVERY 5 MIN PRN
Status: DISCONTINUED | OUTPATIENT
Start: 2025-09-02 | End: 2025-09-02 | Stop reason: HOSPADM

## 2025-09-02 RX ORDER — ONDANSETRON 2 MG/ML
4 INJECTION INTRAMUSCULAR; INTRAVENOUS EVERY 6 HOURS PRN
Status: DISCONTINUED | OUTPATIENT
Start: 2025-09-02 | End: 2025-09-02 | Stop reason: HOSPADM

## 2025-09-02 RX ORDER — CEFAZOLIN SODIUM/WATER 2 G/20 ML
2 SYRINGE (ML) INTRAVENOUS
Status: DISCONTINUED | OUTPATIENT
Start: 2025-09-02 | End: 2025-09-02

## 2025-09-02 RX ORDER — SODIUM CHLORIDE, SODIUM LACTATE, POTASSIUM CHLORIDE, CALCIUM CHLORIDE 600; 310; 30; 20 MG/100ML; MG/100ML; MG/100ML; MG/100ML
INJECTION, SOLUTION INTRAVENOUS CONTINUOUS PRN
Status: DISCONTINUED | OUTPATIENT
Start: 2025-09-02 | End: 2025-09-02

## 2025-09-02 RX ORDER — NALOXONE HYDROCHLORIDE 0.4 MG/ML
0.1 INJECTION, SOLUTION INTRAMUSCULAR; INTRAVENOUS; SUBCUTANEOUS
Status: DISCONTINUED | OUTPATIENT
Start: 2025-09-02 | End: 2025-09-02 | Stop reason: HOSPADM

## 2025-09-02 RX ORDER — DEXAMETHASONE SODIUM PHOSPHATE 4 MG/ML
4 INJECTION, SOLUTION INTRA-ARTICULAR; INTRALESIONAL; INTRAMUSCULAR; INTRAVENOUS; SOFT TISSUE
Status: DISCONTINUED | OUTPATIENT
Start: 2025-09-02 | End: 2025-09-02 | Stop reason: HOSPADM

## 2025-09-02 RX ORDER — OXYCODONE HYDROCHLORIDE 5 MG/1
5-10 TABLET ORAL EVERY 4 HOURS PRN
Qty: 26 TABLET | Refills: 0 | Status: SHIPPED | OUTPATIENT
Start: 2025-09-02

## 2025-09-02 RX ORDER — LIDOCAINE 40 MG/G
CREAM TOPICAL
Status: DISCONTINUED | OUTPATIENT
Start: 2025-09-02 | End: 2025-09-02 | Stop reason: HOSPADM

## 2025-09-02 RX ORDER — CEFAZOLIN SODIUM/WATER 2 G/20 ML
2 SYRINGE (ML) INTRAVENOUS SEE ADMIN INSTRUCTIONS
Status: DISCONTINUED | OUTPATIENT
Start: 2025-09-02 | End: 2025-09-02 | Stop reason: HOSPADM

## 2025-09-02 RX ORDER — OXYCODONE HYDROCHLORIDE 5 MG/1
10 TABLET ORAL EVERY 4 HOURS PRN
Status: DISCONTINUED | OUTPATIENT
Start: 2025-09-02 | End: 2025-09-02 | Stop reason: HOSPADM

## 2025-09-02 RX ORDER — GLYCOPYRROLATE 0.2 MG/ML
INJECTION, SOLUTION INTRAMUSCULAR; INTRAVENOUS PRN
Status: DISCONTINUED | OUTPATIENT
Start: 2025-09-02 | End: 2025-09-02

## 2025-09-02 RX ORDER — HYDROMORPHONE HCL IN WATER/PF 6 MG/30 ML
0.2 PATIENT CONTROLLED ANALGESIA SYRINGE INTRAVENOUS
Status: DISCONTINUED | OUTPATIENT
Start: 2025-09-02 | End: 2025-09-02 | Stop reason: HOSPADM

## 2025-09-02 RX ORDER — CEFAZOLIN SODIUM/WATER 2 G/20 ML
2 SYRINGE (ML) INTRAVENOUS SEE ADMIN INSTRUCTIONS
Status: DISCONTINUED | OUTPATIENT
Start: 2025-09-02 | End: 2025-09-02

## 2025-09-02 RX ORDER — OXYCODONE HYDROCHLORIDE 5 MG/1
5 TABLET ORAL
Status: DISCONTINUED | OUTPATIENT
Start: 2025-09-02 | End: 2025-09-02 | Stop reason: HOSPADM

## 2025-09-02 RX ORDER — ONDANSETRON 4 MG/1
4 TABLET, ORALLY DISINTEGRATING ORAL EVERY 30 MIN PRN
Status: DISCONTINUED | OUTPATIENT
Start: 2025-09-02 | End: 2025-09-02 | Stop reason: HOSPADM

## 2025-09-02 RX ORDER — PROPOFOL 10 MG/ML
INJECTION, EMULSION INTRAVENOUS PRN
Status: DISCONTINUED | OUTPATIENT
Start: 2025-09-02 | End: 2025-09-02

## 2025-09-02 RX ORDER — LABETALOL HYDROCHLORIDE 5 MG/ML
10 INJECTION, SOLUTION INTRAVENOUS
Status: DISCONTINUED | OUTPATIENT
Start: 2025-09-02 | End: 2025-09-02 | Stop reason: HOSPADM

## 2025-09-02 RX ORDER — OXYCODONE HYDROCHLORIDE 5 MG/1
5 TABLET ORAL EVERY 4 HOURS PRN
Status: DISCONTINUED | OUTPATIENT
Start: 2025-09-02 | End: 2025-09-02 | Stop reason: HOSPADM

## 2025-09-02 RX ORDER — POLYETHYLENE GLYCOL 3350 17 G/17G
17 POWDER, FOR SOLUTION ORAL DAILY
Status: DISCONTINUED | OUTPATIENT
Start: 2025-09-03 | End: 2025-09-02 | Stop reason: HOSPADM

## 2025-09-02 RX ORDER — KETOROLAC TROMETHAMINE 15 MG/ML
15 INJECTION, SOLUTION INTRAMUSCULAR; INTRAVENOUS EVERY 6 HOURS
Status: DISCONTINUED | OUTPATIENT
Start: 2025-09-02 | End: 2025-09-02 | Stop reason: HOSPADM

## 2025-09-02 RX ORDER — POLYETHYLENE GLYCOL 3350 17 G/17G
1 POWDER, FOR SOLUTION ORAL DAILY
Qty: 7 PACKET | Refills: 0 | Status: SHIPPED | OUTPATIENT
Start: 2025-09-02

## 2025-09-02 RX ORDER — AMOXICILLIN 250 MG
1-2 CAPSULE ORAL 2 TIMES DAILY
Qty: 30 TABLET | Refills: 0 | Status: SHIPPED | OUTPATIENT
Start: 2025-09-02

## 2025-09-02 RX ORDER — SODIUM CHLORIDE, SODIUM LACTATE, POTASSIUM CHLORIDE, CALCIUM CHLORIDE 600; 310; 30; 20 MG/100ML; MG/100ML; MG/100ML; MG/100ML
INJECTION, SOLUTION INTRAVENOUS CONTINUOUS
Status: DISCONTINUED | OUTPATIENT
Start: 2025-09-02 | End: 2025-09-02 | Stop reason: HOSPADM

## 2025-09-02 RX ORDER — MEPERIDINE HYDROCHLORIDE 50 MG/ML
12.5 INJECTION INTRAMUSCULAR; INTRAVENOUS; SUBCUTANEOUS EVERY 5 MIN PRN
Status: DISCONTINUED | OUTPATIENT
Start: 2025-09-02 | End: 2025-09-02 | Stop reason: HOSPADM

## 2025-09-02 RX ORDER — DEXAMETHASONE SODIUM PHOSPHATE 4 MG/ML
INJECTION, SOLUTION INTRA-ARTICULAR; INTRALESIONAL; INTRAMUSCULAR; INTRAVENOUS; SOFT TISSUE PRN
Status: DISCONTINUED | OUTPATIENT
Start: 2025-09-02 | End: 2025-09-02

## 2025-09-02 RX ORDER — CEFAZOLIN SODIUM 1 G/3ML
1 INJECTION, POWDER, FOR SOLUTION INTRAMUSCULAR; INTRAVENOUS EVERY 6 HOURS
Status: DISCONTINUED | OUTPATIENT
Start: 2025-09-02 | End: 2025-09-02 | Stop reason: HOSPADM

## 2025-09-02 RX ORDER — ONDANSETRON 2 MG/ML
INJECTION INTRAMUSCULAR; INTRAVENOUS PRN
Status: DISCONTINUED | OUTPATIENT
Start: 2025-09-02 | End: 2025-09-02

## 2025-09-02 RX ORDER — HYDROMORPHONE HCL IN WATER/PF 6 MG/30 ML
0.2 PATIENT CONTROLLED ANALGESIA SYRINGE INTRAVENOUS EVERY 5 MIN PRN
Status: DISCONTINUED | OUTPATIENT
Start: 2025-09-02 | End: 2025-09-02 | Stop reason: HOSPADM

## 2025-09-02 RX ORDER — CEFAZOLIN SODIUM/WATER 2 G/20 ML
2 SYRINGE (ML) INTRAVENOUS
Status: COMPLETED | OUTPATIENT
Start: 2025-09-02 | End: 2025-09-02

## 2025-09-02 RX ORDER — HYDROMORPHONE HCL IN WATER/PF 6 MG/30 ML
0.4 PATIENT CONTROLLED ANALGESIA SYRINGE INTRAVENOUS
Status: DISCONTINUED | OUTPATIENT
Start: 2025-09-02 | End: 2025-09-02 | Stop reason: HOSPADM

## 2025-09-02 RX ORDER — TRANEXAMIC ACID 650 MG/1
1950 TABLET ORAL ONCE
Status: DISCONTINUED | OUTPATIENT
Start: 2025-09-02 | End: 2025-09-02

## 2025-09-02 RX ORDER — OXYCODONE HYDROCHLORIDE 5 MG/1
10 TABLET ORAL
Status: DISCONTINUED | OUTPATIENT
Start: 2025-09-02 | End: 2025-09-02 | Stop reason: HOSPADM

## 2025-09-02 RX ORDER — ACETAMINOPHEN 325 MG/1
975 TABLET ORAL EVERY 8 HOURS
Status: DISCONTINUED | OUTPATIENT
Start: 2025-09-02 | End: 2025-09-02 | Stop reason: HOSPADM

## 2025-09-02 RX ORDER — HYDROMORPHONE HCL IN WATER/PF 6 MG/30 ML
0.4 PATIENT CONTROLLED ANALGESIA SYRINGE INTRAVENOUS EVERY 5 MIN PRN
Status: DISCONTINUED | OUTPATIENT
Start: 2025-09-02 | End: 2025-09-02 | Stop reason: HOSPADM

## 2025-09-02 RX ORDER — ASPIRIN 81 MG/1
81 TABLET ORAL 2 TIMES DAILY
Status: DISCONTINUED | OUTPATIENT
Start: 2025-09-02 | End: 2025-09-02 | Stop reason: HOSPADM

## 2025-09-02 RX ORDER — TRANEXAMIC ACID 650 MG/1
1950 TABLET ORAL ONCE
Status: COMPLETED | OUTPATIENT
Start: 2025-09-02 | End: 2025-09-02

## 2025-09-02 RX ORDER — IBUPROFEN 600 MG/1
600 TABLET, FILM COATED ORAL EVERY 6 HOURS PRN
Qty: 30 TABLET | Refills: 0 | Status: SHIPPED | OUTPATIENT
Start: 2025-09-02

## 2025-09-02 RX ORDER — LIDOCAINE HYDROCHLORIDE 20 MG/ML
INJECTION, SOLUTION INFILTRATION; PERINEURAL PRN
Status: DISCONTINUED | OUTPATIENT
Start: 2025-09-02 | End: 2025-09-02

## 2025-09-02 RX ORDER — FENTANYL CITRATE 50 UG/ML
INJECTION, SOLUTION INTRAMUSCULAR; INTRAVENOUS PRN
Status: DISCONTINUED | OUTPATIENT
Start: 2025-09-02 | End: 2025-09-02

## 2025-09-02 RX ORDER — ONDANSETRON 4 MG/1
4 TABLET, ORALLY DISINTEGRATING ORAL EVERY 6 HOURS PRN
Status: DISCONTINUED | OUTPATIENT
Start: 2025-09-02 | End: 2025-09-02 | Stop reason: HOSPADM

## 2025-09-02 RX ORDER — ACETAMINOPHEN 325 MG/1
975 TABLET ORAL ONCE
Status: COMPLETED | OUTPATIENT
Start: 2025-09-02 | End: 2025-09-02

## 2025-09-02 RX ORDER — ALBUTEROL SULFATE 0.83 MG/ML
2.5 SOLUTION RESPIRATORY (INHALATION) EVERY 4 HOURS PRN
Status: DISCONTINUED | OUTPATIENT
Start: 2025-09-02 | End: 2025-09-02 | Stop reason: HOSPADM

## 2025-09-02 RX ORDER — HYDRALAZINE HYDROCHLORIDE 20 MG/ML
2.5-5 INJECTION INTRAMUSCULAR; INTRAVENOUS EVERY 10 MIN PRN
Status: DISCONTINUED | OUTPATIENT
Start: 2025-09-02 | End: 2025-09-02 | Stop reason: HOSPADM

## 2025-09-02 RX ORDER — PROPOFOL 10 MG/ML
INJECTION, EMULSION INTRAVENOUS CONTINUOUS PRN
Status: DISCONTINUED | OUTPATIENT
Start: 2025-09-02 | End: 2025-09-02

## 2025-09-02 RX ORDER — FENTANYL CITRATE 50 UG/ML
50 INJECTION, SOLUTION INTRAMUSCULAR; INTRAVENOUS EVERY 5 MIN PRN
Status: DISCONTINUED | OUTPATIENT
Start: 2025-09-02 | End: 2025-09-02 | Stop reason: HOSPADM

## 2025-09-02 RX ORDER — ONDANSETRON 2 MG/ML
4 INJECTION INTRAMUSCULAR; INTRAVENOUS EVERY 30 MIN PRN
Status: DISCONTINUED | OUTPATIENT
Start: 2025-09-02 | End: 2025-09-02 | Stop reason: HOSPADM

## 2025-09-02 RX ORDER — PROCHLORPERAZINE MALEATE 10 MG
10 TABLET ORAL EVERY 6 HOURS PRN
Status: DISCONTINUED | OUTPATIENT
Start: 2025-09-02 | End: 2025-09-02 | Stop reason: HOSPADM

## 2025-09-02 RX ORDER — ACETAMINOPHEN 325 MG/1
650 TABLET ORAL EVERY 4 HOURS PRN
Qty: 100 TABLET | Refills: 0 | Status: SHIPPED | OUTPATIENT
Start: 2025-09-02

## 2025-09-02 RX ORDER — AMOXICILLIN 250 MG
1 CAPSULE ORAL 2 TIMES DAILY
Status: DISCONTINUED | OUTPATIENT
Start: 2025-09-02 | End: 2025-09-02 | Stop reason: HOSPADM

## 2025-09-02 RX ORDER — BISACODYL 10 MG
10 SUPPOSITORY, RECTAL RECTAL DAILY PRN
Status: DISCONTINUED | OUTPATIENT
Start: 2025-09-02 | End: 2025-09-02 | Stop reason: HOSPADM

## 2025-09-02 RX ORDER — ASPIRIN 81 MG/1
81 TABLET ORAL 2 TIMES DAILY
Qty: 60 TABLET | Refills: 0 | Status: SHIPPED | OUTPATIENT
Start: 2025-09-02

## 2025-09-02 RX ADMIN — LIDOCAINE HYDROCHLORIDE 50 MG: 20 INJECTION, SOLUTION INFILTRATION; PERINEURAL at 07:32

## 2025-09-02 RX ADMIN — HYDROMORPHONE HYDROCHLORIDE 1 MG: 1 INJECTION, SOLUTION INTRAMUSCULAR; INTRAVENOUS; SUBCUTANEOUS at 07:51

## 2025-09-02 RX ADMIN — MIDAZOLAM 2 MG: 1 INJECTION INTRAMUSCULAR; INTRAVENOUS at 07:27

## 2025-09-02 RX ADMIN — PHENYLEPHRINE HYDROCHLORIDE 100 MCG: 10 INJECTION INTRAVENOUS at 07:43

## 2025-09-02 RX ADMIN — ACETAMINOPHEN 975 MG: 325 TABLET ORAL at 11:06

## 2025-09-02 RX ADMIN — FENTANYL CITRATE 50 MCG: 50 INJECTION, SOLUTION INTRAMUSCULAR; INTRAVENOUS at 09:53

## 2025-09-02 RX ADMIN — TRANEXAMIC ACID 1950 MG: 650 TABLET ORAL at 06:21

## 2025-09-02 RX ADMIN — PROPOFOL 200 MG: 10 INJECTION, EMULSION INTRAVENOUS at 07:32

## 2025-09-02 RX ADMIN — ROCURONIUM BROMIDE 10 MG: 50 INJECTION, SOLUTION INTRAVENOUS at 07:48

## 2025-09-02 RX ADMIN — ONDANSETRON 4 MG: 2 INJECTION INTRAMUSCULAR; INTRAVENOUS at 08:56

## 2025-09-02 RX ADMIN — CEFAZOLIN 1 G: 1 INJECTION, POWDER, FOR SOLUTION INTRAMUSCULAR; INTRAVENOUS at 14:21

## 2025-09-02 RX ADMIN — FENTANYL CITRATE 25 MCG: 50 INJECTION, SOLUTION INTRAMUSCULAR; INTRAVENOUS at 10:06

## 2025-09-02 RX ADMIN — ONDANSETRON 4 MG: 2 INJECTION, SOLUTION INTRAMUSCULAR; INTRAVENOUS at 14:21

## 2025-09-02 RX ADMIN — PROPOFOL 150 MCG/KG/MIN: 10 INJECTION, EMULSION INTRAVENOUS at 07:36

## 2025-09-02 RX ADMIN — GLYCOPYRROLATE 0.2 MG: 0.2 INJECTION, SOLUTION INTRAMUSCULAR; INTRAVENOUS at 07:44

## 2025-09-02 RX ADMIN — FENTANYL CITRATE 25 MCG: 50 INJECTION, SOLUTION INTRAMUSCULAR; INTRAVENOUS at 09:40

## 2025-09-02 RX ADMIN — SODIUM CHLORIDE, SODIUM LACTATE, POTASSIUM CHLORIDE, AND CALCIUM CHLORIDE: .6; .31; .03; .02 INJECTION, SOLUTION INTRAVENOUS at 10:55

## 2025-09-02 RX ADMIN — DEXAMETHASONE SODIUM PHOSPHATE 8 MG: 4 INJECTION, SOLUTION INTRA-ARTICULAR; INTRALESIONAL; INTRAMUSCULAR; INTRAVENOUS; SOFT TISSUE at 07:32

## 2025-09-02 RX ADMIN — SODIUM CHLORIDE, SODIUM LACTATE, POTASSIUM CHLORIDE, AND CALCIUM CHLORIDE: .6; .31; .03; .02 INJECTION, SOLUTION INTRAVENOUS at 06:44

## 2025-09-02 RX ADMIN — SODIUM CHLORIDE, SODIUM LACTATE, POTASSIUM CHLORIDE, AND CALCIUM CHLORIDE: .6; .31; .03; .02 INJECTION, SOLUTION INTRAVENOUS at 07:28

## 2025-09-02 RX ADMIN — FENTANYL CITRATE 100 MCG: 50 INJECTION INTRAMUSCULAR; INTRAVENOUS at 07:32

## 2025-09-02 RX ADMIN — Medication 2 G: at 07:24

## 2025-09-02 ASSESSMENT — ACTIVITIES OF DAILY LIVING (ADL)
ADLS_ACUITY_SCORE: 18
ADLS_ACUITY_SCORE: 19
ADLS_ACUITY_SCORE: 20
ADLS_ACUITY_SCORE: 18
ADLS_ACUITY_SCORE: 18
ADLS_ACUITY_SCORE: 20
ADLS_ACUITY_SCORE: 20

## 2025-09-02 ASSESSMENT — ENCOUNTER SYMPTOMS: SEIZURES: 1

## 2025-09-03 ENCOUNTER — THERAPY VISIT (OUTPATIENT)
Dept: PHYSICAL THERAPY | Facility: CLINIC | Age: 60
End: 2025-09-03
Payer: OTHER GOVERNMENT

## 2025-09-03 DIAGNOSIS — Z96.652 S/P TOTAL KNEE ARTHROPLASTY, LEFT: ICD-10-CM

## 2025-09-03 DIAGNOSIS — M17.0 BILATERAL PRIMARY OSTEOARTHRITIS OF KNEE: ICD-10-CM

## 2025-09-03 PROCEDURE — 97110 THERAPEUTIC EXERCISES: CPT | Mod: GP | Performed by: PHYSICAL THERAPIST

## 2025-09-03 PROCEDURE — 97530 THERAPEUTIC ACTIVITIES: CPT | Mod: GP | Performed by: PHYSICAL THERAPIST

## 2025-09-03 PROCEDURE — 97161 PT EVAL LOW COMPLEX 20 MIN: CPT | Mod: GP | Performed by: PHYSICAL THERAPIST

## (undated) DEVICE — BLADE SAW SAGITTAL STRK 13X90X1.27MM HD SYS 6 6113-127-090

## (undated) DEVICE — PACK TOTAL KNEE BOXED LATEX FREE PO15TKFCT

## (undated) DEVICE — TOURNIQUET SGL  BLADDER 30"X4" BLUE 5921030135

## (undated) DEVICE — BAG CLEAR TRASH 1.3M 39X33" P4040C

## (undated) DEVICE — DILATOR PROBE UTERINE OS CANAL FINDER 260-610

## (undated) DEVICE — LINEN TOWEL PACK X5 5464

## (undated) DEVICE — LINEN FULL SHEET 5511

## (undated) DEVICE — LINEN GOWN X4 5410

## (undated) DEVICE — GLOVE PROTEXIS BLUE W/NEU-THERA 7.0  2D73EB70

## (undated) DEVICE — BNDG ELASTIC 6" DBL LENGTH UNSTERILE 6611-16

## (undated) DEVICE — DRSG TEGADERM 4X10" 1627

## (undated) DEVICE — DRSG STERI STRIP 1/2X4" R1547

## (undated) DEVICE — GLOVE PROTEXIS W/NEU-THERA 6.5  2D73TE65

## (undated) DEVICE — DRAPE STERI U 1015

## (undated) DEVICE — Device

## (undated) DEVICE — PAD CHUX UNDERPAD 30X36" P3036C

## (undated) DEVICE — SUCTION MANIFOLD NEPTUNE 2 SYS 4 PORT 0702-020-000

## (undated) DEVICE — HOOD SURG T7PLUS PEEL AWAY FACE SHIELD STRL LF 0416-801-100

## (undated) DEVICE — GLOVE PROTEXIS BLUE W/NEU-THERA 8.5  2D73EB85

## (undated) DEVICE — SOL WATER IRRIG 1000ML BOTTLE 2F7114

## (undated) DEVICE — GLOVE PROTEXIS BLUE W/NEU-THERA 8.0  2D73EB80

## (undated) DEVICE — SOL NACL 0.9% IRRIG 3000ML BAG 2B7477

## (undated) DEVICE — LINEN ORTHO ACL PACK 5447

## (undated) DEVICE — SET HANDPIECE INTERPULSE W/COAXIAL FAN SPRAY TIP 0210118000

## (undated) DEVICE — SUTURE MONOCRYL+ 3-0 PS-1 27" UNDYED MCP936H

## (undated) DEVICE — ESU PENCIL SMOKE EVAC W/ROCKER SWITCH 0703-047-000

## (undated) DEVICE — SUTURE VICRYL+ 2-0 36IN CT-1 UND VCP945H

## (undated) DEVICE — SPONGE LAP 18X18" X8435

## (undated) DEVICE — SOL NACL 0.9% IRRIG 1000ML BOTTLE 2F7124

## (undated) DEVICE — SU VICRYL+ 0 MO-4 8X18IN VIO VCP701D

## (undated) DEVICE — SEAL SET MYOSURE ROD LENS SCOPE SINGLE USE 40-902

## (undated) DEVICE — SOLUTION IV IRRIGATION 0.9% NACL 3L R8206

## (undated) DEVICE — DRSG SILVERCEL 4.25X4.25" 900404

## (undated) DEVICE — GLOVE BIOGEL PI SZ 7.5 40875

## (undated) DEVICE — GLOVE PROTEXIS POWDER FREE 8.5 ORTHO LIME 2D73ET85

## (undated) DEVICE — ESU HOLDER LAP INST DISP YELLOW SHORT 250MM H-PRO-250

## (undated) DEVICE — KIT PROCEDURE FLUENT IN/OUT FLOWPAK TISS TRAP FLT-112S

## (undated) DEVICE — SU VICRYL+ 1 MO-4 18" DYED VCP702D

## (undated) RX ORDER — TRANEXAMIC ACID 650 MG/1
TABLET ORAL
Status: DISPENSED
Start: 2025-09-02

## (undated) RX ORDER — LIDOCAINE HYDROCHLORIDE 10 MG/ML
INJECTION, SOLUTION EPIDURAL; INFILTRATION; INTRACAUDAL; PERINEURAL
Status: DISPENSED
Start: 2025-09-02

## (undated) RX ORDER — FENTANYL CITRATE 50 UG/ML
INJECTION, SOLUTION INTRAMUSCULAR; INTRAVENOUS
Status: DISPENSED
Start: 2021-08-02

## (undated) RX ORDER — FENTANYL CITRATE 50 UG/ML
INJECTION, SOLUTION INTRAMUSCULAR; INTRAVENOUS
Status: DISPENSED
Start: 2025-09-02

## (undated) RX ORDER — DEXAMETHASONE SODIUM PHOSPHATE 4 MG/ML
INJECTION, SOLUTION INTRA-ARTICULAR; INTRALESIONAL; INTRAMUSCULAR; INTRAVENOUS; SOFT TISSUE
Status: DISPENSED
Start: 2025-09-02

## (undated) RX ORDER — FENTANYL CITRATE-0.9 % NACL/PF 10 MCG/ML
PLASTIC BAG, INJECTION (ML) INTRAVENOUS
Status: DISPENSED
Start: 2025-09-02

## (undated) RX ORDER — KETOROLAC TROMETHAMINE 30 MG/ML
INJECTION, SOLUTION INTRAMUSCULAR; INTRAVENOUS
Status: DISPENSED
Start: 2021-08-02

## (undated) RX ORDER — ONDANSETRON 2 MG/ML
INJECTION INTRAMUSCULAR; INTRAVENOUS
Status: DISPENSED
Start: 2025-09-02

## (undated) RX ORDER — PROPOFOL 10 MG/ML
INJECTION, EMULSION INTRAVENOUS
Status: DISPENSED
Start: 2021-08-02

## (undated) RX ORDER — HYALURONATE SODIUM 10 MG/ML
SYRINGE (ML) INTRAARTICULAR
Status: DISPENSED
Start: 2024-12-03

## (undated) RX ORDER — KETOROLAC TROMETHAMINE 30 MG/ML
INJECTION, SOLUTION INTRAMUSCULAR; INTRAVENOUS
Status: DISPENSED
Start: 2025-09-02

## (undated) RX ORDER — HYALURONATE SODIUM 10 MG/ML
SYRINGE (ML) INTRAARTICULAR
Status: DISPENSED
Start: 2024-12-10

## (undated) RX ORDER — ACETAMINOPHEN 325 MG/1
TABLET ORAL
Status: DISPENSED
Start: 2021-08-02

## (undated) RX ORDER — TRIAMCINOLONE ACETONIDE 40 MG/ML
INJECTION, SUSPENSION INTRA-ARTICULAR; INTRAMUSCULAR
Status: DISPENSED
Start: 2024-09-25

## (undated) RX ORDER — ACETAMINOPHEN 325 MG/1
TABLET ORAL
Status: DISPENSED
Start: 2025-09-02

## (undated) RX ORDER — PROPOFOL 10 MG/ML
INJECTION, EMULSION INTRAVENOUS
Status: DISPENSED
Start: 2025-09-02

## (undated) RX ORDER — CEFAZOLIN SODIUM 1 G/3ML
INJECTION, POWDER, FOR SOLUTION INTRAMUSCULAR; INTRAVENOUS
Status: DISPENSED
Start: 2025-09-02

## (undated) RX ORDER — LIDOCAINE HYDROCHLORIDE 10 MG/ML
INJECTION, SOLUTION EPIDURAL; INFILTRATION; INTRACAUDAL; PERINEURAL
Status: DISPENSED
Start: 2024-09-25

## (undated) RX ORDER — LIDOCAINE HYDROCHLORIDE 10 MG/ML
INJECTION, SOLUTION EPIDURAL; INFILTRATION; INTRACAUDAL; PERINEURAL
Status: DISPENSED
Start: 2021-08-02

## (undated) RX ORDER — ONDANSETRON 2 MG/ML
INJECTION INTRAMUSCULAR; INTRAVENOUS
Status: DISPENSED
Start: 2021-08-02

## (undated) RX ORDER — HYALURONATE SODIUM 10 MG/ML
SYRINGE (ML) INTRAARTICULAR
Status: DISPENSED
Start: 2024-12-17